# Patient Record
Sex: FEMALE | Race: BLACK OR AFRICAN AMERICAN | NOT HISPANIC OR LATINO | Employment: OTHER | URBAN - METROPOLITAN AREA
[De-identification: names, ages, dates, MRNs, and addresses within clinical notes are randomized per-mention and may not be internally consistent; named-entity substitution may affect disease eponyms.]

---

## 2017-10-30 ENCOUNTER — CONVERSION ENCOUNTER (OUTPATIENT)
Dept: OTHER | Facility: HOSPITAL | Age: 67
End: 2017-10-30

## 2018-04-11 ENCOUNTER — OFFICE VISIT CONVERTED (OUTPATIENT)
Dept: FAMILY MEDICINE CLINIC | Age: 68
End: 2018-04-11
Attending: FAMILY MEDICINE

## 2018-10-25 ENCOUNTER — OFFICE VISIT CONVERTED (OUTPATIENT)
Dept: FAMILY MEDICINE CLINIC | Age: 68
End: 2018-10-25
Attending: FAMILY MEDICINE

## 2018-11-08 ENCOUNTER — CONVERSION ENCOUNTER (OUTPATIENT)
Dept: OTHER | Facility: HOSPITAL | Age: 68
End: 2018-11-08

## 2018-11-21 ENCOUNTER — CONVERSION ENCOUNTER (OUTPATIENT)
Dept: OTHER | Facility: HOSPITAL | Age: 68
End: 2018-11-21

## 2019-04-17 ENCOUNTER — HOSPITAL ENCOUNTER (OUTPATIENT)
Dept: OTHER | Facility: HOSPITAL | Age: 69
Discharge: HOME | End: 2019-04-17
Attending: FAMILY MEDICINE

## 2019-04-17 ENCOUNTER — OFFICE VISIT CONVERTED (OUTPATIENT)
Dept: FAMILY MEDICINE CLINIC | Age: 69
End: 2019-04-17
Attending: FAMILY MEDICINE

## 2019-04-17 LAB
ALBUMIN SERPL-MCNC: 4.2 G/DL (ref 3.5–5)
ALBUMIN/GLOB SERPL: 1.3 {RATIO} (ref 1.4–2.6)
ALP SERPL-CCNC: 91 U/L (ref 43–160)
ALT SERPL-CCNC: 17 U/L (ref 10–40)
ANION GAP SERPL CALC-SCNC: 18 MMOL/L (ref 8–19)
AST SERPL-CCNC: 17 U/L (ref 15–50)
BILIRUB SERPL-MCNC: 1.02 MG/DL (ref 0.2–1.3)
BUN SERPL-MCNC: 19 MG/DL (ref 5–25)
BUN/CREAT SERPL: 27 {RATIO} (ref 6–20)
CALCIUM SERPL-MCNC: 9.6 MG/DL (ref 8.7–10.4)
CHLORIDE SERPL-SCNC: 101 MMOL/L (ref 99–111)
CHOLEST SERPL-MCNC: 161 MG/DL (ref 107–200)
CHOLEST/HDLC SERPL: 2.4 {RATIO} (ref 3–6)
CONV CO2: 26 MMOL/L (ref 22–32)
CONV TOTAL PROTEIN: 7.4 G/DL (ref 6.3–8.2)
CREAT UR-MCNC: 0.71 MG/DL (ref 0.5–0.9)
ERYTHROCYTE [DISTWIDTH] IN BLOOD BY AUTOMATED COUNT: 14.5 % (ref 11.5–14.5)
GFR SERPLBLD BASED ON 1.73 SQ M-ARVRAT: >60 ML/MIN/{1.73_M2}
GLOBULIN UR ELPH-MCNC: 3.2 G/DL (ref 2–3.5)
GLUCOSE SERPL-MCNC: 97 MG/DL (ref 65–99)
HBA1C MFR BLD: 12.5 G/DL (ref 12–16)
HCT VFR BLD AUTO: 38 % (ref 37–47)
HDLC SERPL-MCNC: 66 MG/DL (ref 40–60)
LDLC SERPL CALC-MCNC: 77 MG/DL (ref 70–100)
MCH RBC QN AUTO: 27.4 PG (ref 27–31)
MCHC RBC AUTO-ENTMCNC: 32.9 G/DL (ref 33–37)
MCV RBC AUTO: 83.2 FL (ref 81–99)
OSMOLALITY SERPL CALC.SUM OF ELEC: 294 MOSM/KG (ref 273–304)
PLATELET # BLD AUTO: 262 10*3/UL (ref 130–400)
PMV BLD AUTO: 9.8 FL (ref 7.4–10.4)
POTASSIUM SERPL-SCNC: 4.1 MMOL/L (ref 3.5–5.3)
RBC # BLD AUTO: 4.57 10*6/UL (ref 4.2–5.4)
SODIUM SERPL-SCNC: 141 MMOL/L (ref 135–147)
TRIGL SERPL-MCNC: 90 MG/DL (ref 40–150)
VLDLC SERPL-MCNC: 18 MG/DL (ref 5–37)
WBC # BLD AUTO: 4.87 10*3/UL (ref 4.8–10.8)

## 2019-10-17 ENCOUNTER — HOSPITAL ENCOUNTER (OUTPATIENT)
Dept: OTHER | Facility: HOSPITAL | Age: 69
Discharge: HOME | End: 2019-10-17
Attending: FAMILY MEDICINE

## 2019-10-17 ENCOUNTER — OFFICE VISIT CONVERTED (OUTPATIENT)
Dept: FAMILY MEDICINE CLINIC | Age: 69
End: 2019-10-17
Attending: FAMILY MEDICINE

## 2019-10-17 LAB
ALBUMIN SERPL-MCNC: 4.4 G/DL (ref 3.5–5)
ALBUMIN/GLOB SERPL: 1.4 {RATIO} (ref 1.4–2.6)
ALP SERPL-CCNC: 92 U/L (ref 43–160)
ALT SERPL-CCNC: 16 U/L (ref 10–40)
ANION GAP SERPL CALC-SCNC: 16 MMOL/L (ref 8–19)
AST SERPL-CCNC: 22 U/L (ref 15–50)
BILIRUB SERPL-MCNC: 0.89 MG/DL (ref 0.2–1.3)
BUN SERPL-MCNC: 9 MG/DL (ref 5–25)
BUN/CREAT SERPL: 12 {RATIO} (ref 6–20)
CALCIUM SERPL-MCNC: 9.7 MG/DL (ref 8.7–10.4)
CHLORIDE SERPL-SCNC: 98 MMOL/L (ref 99–111)
CHOLEST SERPL-MCNC: 162 MG/DL (ref 107–200)
CHOLEST/HDLC SERPL: 2.7 {RATIO} (ref 3–6)
CONV CO2: 27 MMOL/L (ref 22–32)
CONV TOTAL PROTEIN: 7.5 G/DL (ref 6.3–8.2)
CREAT UR-MCNC: 0.77 MG/DL (ref 0.5–0.9)
GFR SERPLBLD BASED ON 1.73 SQ M-ARVRAT: >60 ML/MIN/{1.73_M2}
GLOBULIN UR ELPH-MCNC: 3.1 G/DL (ref 2–3.5)
GLUCOSE SERPL-MCNC: 102 MG/DL (ref 65–99)
HDLC SERPL-MCNC: 60 MG/DL (ref 40–60)
LDLC SERPL CALC-MCNC: 83 MG/DL (ref 70–100)
OSMOLALITY SERPL CALC.SUM OF ELEC: 283 MOSM/KG (ref 273–304)
POTASSIUM SERPL-SCNC: 3.9 MMOL/L (ref 3.5–5.3)
SODIUM SERPL-SCNC: 137 MMOL/L (ref 135–147)
TRIGL SERPL-MCNC: 93 MG/DL (ref 40–150)
VLDLC SERPL-MCNC: 19 MG/DL (ref 5–37)

## 2019-11-11 ENCOUNTER — HOSPITAL ENCOUNTER (OUTPATIENT)
Dept: OTHER | Facility: HOSPITAL | Age: 69
Discharge: HOME | End: 2019-11-11
Attending: FAMILY MEDICINE

## 2020-04-20 ENCOUNTER — OFFICE VISIT CONVERTED (OUTPATIENT)
Dept: FAMILY MEDICINE CLINIC | Age: 70
End: 2020-04-20
Attending: FAMILY MEDICINE

## 2020-06-02 ENCOUNTER — HOSPITAL ENCOUNTER (OUTPATIENT)
Dept: OTHER | Facility: HOSPITAL | Age: 70
Discharge: HOME | End: 2020-06-02
Attending: FAMILY MEDICINE

## 2020-06-02 LAB
ALBUMIN SERPL-MCNC: 4 G/DL (ref 3.5–5)
ALBUMIN/GLOB SERPL: 1.4 {RATIO} (ref 1.4–2.6)
ALP SERPL-CCNC: 96 U/L (ref 43–160)
ALT SERPL-CCNC: 17 U/L (ref 10–40)
ANION GAP SERPL CALC-SCNC: 16 MMOL/L (ref 8–19)
AST SERPL-CCNC: 21 U/L (ref 15–50)
BILIRUB SERPL-MCNC: 0.71 MG/DL (ref 0.2–1.3)
BUN SERPL-MCNC: 15 MG/DL (ref 5–25)
BUN/CREAT SERPL: 20 {RATIO} (ref 6–20)
CALCIUM SERPL-MCNC: 9.2 MG/DL (ref 8.7–10.4)
CHLORIDE SERPL-SCNC: 104 MMOL/L (ref 99–111)
CHOLEST SERPL-MCNC: 154 MG/DL (ref 107–200)
CHOLEST/HDLC SERPL: 2.8 {RATIO} (ref 3–6)
CONV CO2: 25 MMOL/L (ref 22–32)
CONV TOTAL PROTEIN: 6.8 G/DL (ref 6.3–8.2)
CREAT UR-MCNC: 0.75 MG/DL (ref 0.5–0.9)
GFR SERPLBLD BASED ON 1.73 SQ M-ARVRAT: >60 ML/MIN/{1.73_M2}
GLOBULIN UR ELPH-MCNC: 2.8 G/DL (ref 2–3.5)
GLUCOSE SERPL-MCNC: 105 MG/DL (ref 65–99)
HDLC SERPL-MCNC: 56 MG/DL (ref 40–60)
LDLC SERPL CALC-MCNC: 86 MG/DL (ref 70–100)
OSMOLALITY SERPL CALC.SUM OF ELEC: 293 MOSM/KG (ref 273–304)
POTASSIUM SERPL-SCNC: 4.2 MMOL/L (ref 3.5–5.3)
SODIUM SERPL-SCNC: 141 MMOL/L (ref 135–147)
TRIGL SERPL-MCNC: 59 MG/DL (ref 40–150)
VLDLC SERPL-MCNC: 12 MG/DL (ref 5–37)

## 2020-06-15 ENCOUNTER — OFFICE VISIT CONVERTED (OUTPATIENT)
Dept: FAMILY MEDICINE CLINIC | Age: 70
End: 2020-06-15
Attending: FAMILY MEDICINE

## 2020-10-19 ENCOUNTER — OFFICE VISIT CONVERTED (OUTPATIENT)
Dept: FAMILY MEDICINE CLINIC | Age: 70
End: 2020-10-19
Attending: FAMILY MEDICINE

## 2020-12-14 ENCOUNTER — HOSPITAL ENCOUNTER (OUTPATIENT)
Dept: OTHER | Facility: HOSPITAL | Age: 70
Discharge: HOME | End: 2020-12-14
Attending: FAMILY MEDICINE

## 2020-12-14 LAB
ALBUMIN SERPL-MCNC: 4.1 G/DL (ref 3.5–5)
ALBUMIN/GLOB SERPL: 1.4 {RATIO} (ref 1.4–2.6)
ALP SERPL-CCNC: 114 U/L (ref 43–160)
ALT SERPL-CCNC: 20 U/L (ref 10–40)
ANION GAP SERPL CALC-SCNC: 15 MMOL/L (ref 8–19)
AST SERPL-CCNC: 21 U/L (ref 15–50)
BILIRUB SERPL-MCNC: 0.7 MG/DL (ref 0.2–1.3)
BUN SERPL-MCNC: 15 MG/DL (ref 5–25)
BUN/CREAT SERPL: 25 {RATIO} (ref 6–20)
CALCIUM SERPL-MCNC: 9.3 MG/DL (ref 8.7–10.4)
CHLORIDE SERPL-SCNC: 99 MMOL/L (ref 99–111)
CHOLEST SERPL-MCNC: 178 MG/DL (ref 107–200)
CHOLEST/HDLC SERPL: 2.7 {RATIO} (ref 3–6)
CONV CO2: 28 MMOL/L (ref 22–32)
CONV TOTAL PROTEIN: 7 G/DL (ref 6.3–8.2)
CREAT UR-MCNC: 0.61 MG/DL (ref 0.5–0.9)
GFR SERPLBLD BASED ON 1.73 SQ M-ARVRAT: >60 ML/MIN/{1.73_M2}
GLOBULIN UR ELPH-MCNC: 2.9 G/DL (ref 2–3.5)
GLUCOSE SERPL-MCNC: 92 MG/DL (ref 65–99)
HDLC SERPL-MCNC: 65 MG/DL (ref 40–60)
LDLC SERPL CALC-MCNC: 99 MG/DL (ref 70–100)
OSMOLALITY SERPL CALC.SUM OF ELEC: 286 MOSM/KG (ref 273–304)
POTASSIUM SERPL-SCNC: 3.9 MMOL/L (ref 3.5–5.3)
SODIUM SERPL-SCNC: 138 MMOL/L (ref 135–147)
TRIGL SERPL-MCNC: 68 MG/DL (ref 40–150)
VLDLC SERPL-MCNC: 14 MG/DL (ref 5–37)

## 2021-05-18 NOTE — PROGRESS NOTES
Simi Dangelo  1950     Office/Outpatient Visit    Visit Date: Mon, Rey 15, 2020 11:10 am    Provider: Shanika Crawford MD (Assistant: Spurling, Sarah C, MA)    Location: [facility]        Electronically signed by Shanika Crawford MD on  06/16/2020 05:05:46 PM                             Subjective:        CC: Ms. Dangelo is a 70 year old Black or  female.  medicare wellness.;         HPI:           Ms. Dangelo is here for a Medicare wellness visit.  The required HRA questions are integrated within this visit note. Family medical history and individual medical/surgical history were reviewed and updated.  A current height, weight, BMI, blood pressure, and pulse were recorded in the vitals section of the note and have been reviewed. Patient's medications, including supplements, were recorded in the chart and reviewed.  Current providers and suppliers were reviewed and updated.          Self-Assessment of Health: She rates her health as very good. She rates her confidence of being able to control/manage most of her health problems as very confident. Her physical/emotional health has limited her social activites not at all.  A review of cognitive impairment was performed, including ability to drive a car, manage finances, and any memory changes, and was found to be negative.  A review of functional ability, including bathing, dressing, walking, and urine/bowel continence as well as level of safety was performed and was found to be negative.  Falls Risk: Has not had any falls or only one fall without injury in the past year.  She denies having trouble hearing the TV/radio when others do not, having to strain to hear or understand conversations and wearing hearing aid(s).  Concerning home safety, she reports that at home she DOES have adequate lighting, a skid resistant shower/tub, functioning smoke alarms and absence of throw rugs, but not grab bars in the bath or handrails on stairs.   Physical Activity: She never excercises.; Type of diet patient normally eats is described as poor--needs improvement.  Tobacco: She has never smoked.   Preventative Health updated today       finger pain in both fingers B, stiffness, and R is worse than L, onset recently with her increased phone use, playing games.       she is ready to lose weight, she does not exercise    ROS:     CONSTITUTIONAL:  Negative for chills, fatigue, fever, and weight change.      E/N/T:  Negative for hearing problems, E/N/T pain, congestion, rhinorrhea, epistaxis, hoarseness, and dental problems.      CARDIOVASCULAR:  Negative for chest pain, palpitations, tachycardia, orthopnea, and edema.      RESPIRATORY:  Negative for cough, dyspnea, and hemoptysis.      GASTROINTESTINAL:  Negative for abdominal pain, heartburn, constipation, diarrhea, and stool changes.      MUSCULOSKELETAL:  Negative for arthralgias, back pain, and myalgias.      INTEGUMENTARY/BREAST:  Negative for atypical moles, dry skin, pruritis, rashes, breast masses, and nipple discharge.      NEUROLOGICAL:  Negative for dizziness and headaches.      PSYCHIATRIC:  Negative for anxiety, depression, and sleep disturbances.          Past Medical History / Family History / Social History:         Last Reviewed on 6/15/2020 11:56 AM by Shanika Crawford    Past Medical History: OA knees    HTN    hypercholesterolemia    hemorrhoids    TKR    varicose veins    fecal incontinence                 PAST MEDICAL HISTORY             GYNECOLOGICAL HISTORY:        Last mammogram was 11/3/17; No history of abnormal mammograms         CURRENT MEDICAL PROVIDERS:    Ophthalmologist: Hui    Orthopedist: [facility]    Dentist-Dr June         PREVENTIVE HEALTH MAINTENANCE             BONE DENSITY: was last done 19 with normal results     COLORECTAL CANCER SCREENING: Up to date (colonoscopy q10y; sigmoidoscopy q5y; Cologuard q3y) was last done  2/14/14, Results are in chart; colonoscopy with normal results     DENTAL CLEANING: was last done march 2020     EYE EXAM: was last done 8/2019 wears glasses     Hepatitis C Medicare Screening: was last done 1/2008     MAMMOGRAM: Done within last 2 years and results in are chart was last done 11/11/19 with normal results     PAP SMEAR: No longer indicated due to age and history         PAST MEDICAL HISTORY         Positive for    Dr Jay;         PAST MEDICAL HISTORY                 ADVANCED DIRECTIVES: None         Surgical History:         Appendectomy    Tonsillectomy/Adenoidectomy     Hysterectomy: With  L oophrectomy;     giant cell tumor removal 2007 and 2011, 2014 - L thumb;    BTL;     S/P REMOVAL GIANT CELL CYST L THUMB 8/2014 [facility] AND [facility]         Family History:         Positive for Pulmonary Embolism ( brother ).          Tobacco/Alcohol/Supplements:     Last Reviewed on 6/15/2020 11:11 AM by Spurling, Sarah C    Tobacco: She has never smoked.  Non-drinker         Substance Abuse History:     Last Reviewed on 11/17/2015 11:02 AM by Essence Ricks        Mental Health History:     Last Reviewed on 11/17/2015 11:02 AM by Essence Ricks        Communicable Diseases (eg STDs):     Last Reviewed on 11/17/2015 11:02 AM by Essence Ricks        Immunizations:     Td adult 2/2/2008    Td adult 9/15/2011    Hep A, unspecified formulation 6/14/2018    zzPrevnar-13 1/11/2016    Influenza A (H1N1), IM (3+ years) Monovalent 12/18/2009    Fluzone High-Dose pf (>=65 yr) 10/8/2016    Fluzone High-Dose pf (>=65 yr) 10/4/2017    Fluzone High-Dose pf (>=65 yr) 10/25/2018    Fluzone High-Dose pf (>=65 yr) 10/17/2019    Influenza Virus Vaccine, unspecified formulation 9/15/2011    PNEUMOVAX 23 (Pneumococcal PPV23) 12/6/2013    Zostavax (Zoster live) 6/11/2013        Allergies:     Last Reviewed on 4/20/2020 01:10 PM by Yamilka Galindo    No Known Allergies.        Current Medications:     Last Reviewed on 6/15/2020 11:14 AM by  Spurling, Sarah C    Lipitor 10 mg oral tablet [Take 1 tablet(s) by mouth daily]    lisinopriL-hydrochlorothiazide 10-12.5 mg oral tablet [one a day]    B Complex QOD     Vit D 3     Meloxicam 15 mg oral tablet [TAKES HALF OF A 15 DAILY]    Zaditor 0.025 % (0.035 %) ophthalmic (eye) Drops [ 1 gtts to both eyes QD PRN]    Allegra-D 24 Hour 180-240 mg oral Tablet, Extended Release 24 hr [1 tab daily]    fluticasone propionate 50 mcg/actuation Intranasal Spray, Suspension [inhale 1 spray (50 mcg) in each nostril by intranasal route 2 times per day]        Objective:        Vitals:         Current: 6/15/2020 11:22:06 AM    Ht:  5 ft, 3 in;  Wt: 196.8 lbs;  BMI: 34.9T: 97.8 F (oral);  BP: 139/71 mm Hg (left arm, sitting);  P: 69 bpm (left arm (BP Cuff), sitting);  sCr: 0.75 mg/dL;  GFR: 75.14VA: 20/40 OD, 20/30 OS (near, with correction)        Exams:     PHYSICAL EXAM:     GENERAL: vital signs recorded - well developed, well nourished;  no apparent distress;     EYES: PERRL, EOMI     E/N/T:  normal EACs, TMs, nasal/oral mucosa, teeth, gingiva, and oropharynx;     NECK: range of motion is normal; thyroid is non-palpable;     RESPIRATORY: normal respiratory rate and pattern with no distress; normal breath sounds with no rales, rhonchi, wheezes or rubs;     CARDIOVASCULAR: normal rate; rhythm is regular;  no systolic murmur; no edema;     GASTROINTESTINAL: nontender; normal bowel sounds; no organomegaly;     BREAST/INTEGUMENT: BREASTS: breast exam is normal without masses, skin changes, or nipple discharge;     MUSCULOSKELETAL:  Normal range of motion, strength and tone;     NEUROLOGICAL:  cranial nerves, motor and sensory function, reflexes, gait and coordination are all intact;     PSYCHIATRIC:  appropriate affect and demeanor; normal speech pattern; grossly normal memory;         Assessment:         Z00.00   Encounter for general adult medical examination without abnormal findings       M25.541   Pain in joints of right  hand       M25.542   Pain in joints of left hand       E66.01   Morbid (severe) obesity due to excess calories           ORDERS:         Procedures Ordered:         Annual wellness visit, includes a PPPS, subsequent visit  (In-House)                      Plan:         Encounter for general adult medical examination without abnormal findings  MEDICARE WELLNESS EXAM-SHE IS UTD ON COLONOSCOPY/MAMMOGRAM/DEXA/PELVIC, , UTD ON FLU/SHINGLES/PNEUMOVAX/PREVNAR/TDAP,RECOMMEND SHINGRIX AND YEARLY FLU VACCINATION,  NO FALL RISK, NO MEMORY ISSUES OR DEPRESSION, SHE LIVES ALONE,  SHE IS ABLE TO DRIVE AND PERFORM ADL'S/FINANCES, HEARING IS ADEQUATE, SHE HAS A HA ON  A LIVING WILL AND A SAFETY AND A PREV SERVICES HANDOUT WAS GIVEN TO HER. RECOMMEND YEARLY EYE EXAMS, MD LIST UPDATED           Orders:         Annual wellness visit, includes a PPPS, subsequent visit  (In-House)              Pain in joints of right handdue to OA-she defers topical NSAID at this time        Pain in joints of left handdue to OA-she defers topical NSAID at this time        Morbid (severe) obesity due to excess caloriesrecommend walking 1 mile a day and counseled on low carb diet.             Charge Capture:         Primary Diagnosis:     Z00.00  Encounter for general adult medical examination without abnormal findings           Orders:        Annual wellness visit, includes a PPPS, subsequent visit  (In-House)              M25.541  Pain in joints of right hand           Orders:      64648-14  Office/outpatient visit; established patient, level 3  (In-House)              M25.542  Pain in joints of left hand     E66.01  Morbid (severe) obesity due to excess calories

## 2021-05-18 NOTE — PROGRESS NOTES
Simi Dangelo  1950     Office/Outpatient Visit    Visit Date: Mon, Apr 20, 2020 01:08 pm    Provider: Shanika Crawford MD (Assistant: Yamilka Galindo MA)    Location: [facility]        Electronically signed by Shanika Crawford MD on  04/21/2020 05:01:14 PM                             Subjective:        CC: Ms. Dangelo is a 70 year old Black or  female.  Medication refill.;         HPI:           Patient presents with pure hypercholesterolemia, unspecified.  Current treatment includes Lipitor and a low cholesterol/low fat diet.  Compliance with treatment has been good; she maintains her low cholesterol diet.  She denies experiencing any hypercholesterolemia related symptoms.  Most recent lab tests include Total Cholesterol:  162 (mg/dL) (10/17/2019), HDL:  60 (mg/dL) (10/17/2019), Triglycerides:  93 (mg/dL) (10/17/2019), LDL:  83 (mg/dL) (10/17/2019), Glucose, Serum:  102 (mg/dL) (10/17/2019), Creatinine, Serum:  0.77 (mg/dl) (10/17/2019), Glom Filt Rate, Est:  >60 (ml/min/1.73m2) (10/17/2019), Alkaline Phosphatase, Serum:  92 (U/L) (10/17/2019), ALT (SGPT):  16 (U/L) (10/17/2019), AST (SGOT):  22 (U/L) (10/17/2019).            Essential (primary) hypertension details; this was first diagnosed more than 5 years ago.  She is not using any nonpharmacologic treatment modalities.  Her current cardiac medication regimen includes a diuretic and an ACE inhibitor.  Compliance with treatment has been good; she takes her medication as directed, maintains her diet and exercise regimen, and follows up as directed.  She is tolerating the medication well without side effects.  Ms. Dangelo does not check her blood pressure other than at her clinic appointments.      ROS:     CONSTITUTIONAL:  Negative for chills, fatigue, fever, and weight change.      E/N/T:  Negative for hearing problems, E/N/T pain, congestion, rhinorrhea, epistaxis, hoarseness, and dental problems.      CARDIOVASCULAR:   Negative for chest pain, palpitations, tachycardia, orthopnea, and edema.      RESPIRATORY:  Negative for cough, dyspnea, and hemoptysis.      GASTROINTESTINAL:  Negative for abdominal pain, heartburn, constipation, diarrhea, and stool changes.      MUSCULOSKELETAL:  Negative for arthralgias, back pain, and myalgias.      INTEGUMENTARY/BREAST:  Negative for atypical moles, dry skin, pruritis, rashes, breast masses, and nipple discharge.      NEUROLOGICAL:  Negative for dizziness and headaches.      PSYCHIATRIC:  Negative for anxiety, depression, and sleep disturbances.          Past Medical History / Family History / Social History:         Last Reviewed on 2020 01:23 PM by Shanika Crawford    Past Medical History: OA knees    HTN    hypercholesterolemia    hemorrhoids    TKR    varicose veins    fecal incontinence                 PAST MEDICAL HISTORY             GYNECOLOGICAL HISTORY:        Last mammogram was 11/3/17; No history of abnormal mammograms         CURRENT MEDICAL PROVIDERS:    Ophthalmologist: Hui    Orthopedist: [facility]    Dentist-Dr June         PREVENTIVE HEALTH MAINTENANCE             BONE DENSITY: was last done 19 with normal results     COLORECTAL CANCER SCREENING: Up to date (colonoscopy q10y; sigmoidoscopy q5y; Cologuard q3y) was last done 14, Results are in chart; colonoscopy with normal results     EYE EXAM: was last done 2018 wears glasses     Hepatitis C Medicare Screening: was last done 2008     MAMMOGRAM: Done within last 2 years and results in are chart was last done 19 with normal results     PAP SMEAR: No longer indicated due to age and history         PAST MEDICAL HISTORY         Positive for    Dr Jay;         PAST MEDICAL HISTORY                 ADVANCED DIRECTIVES: None         Surgical History:         Appendectomy    Tonsillectomy/Adenoidectomy     Hysterectomy: With  L oophrectomy;     giant cell tumor  removal 2007 and 2011, 2014 - L thumb;    BTL;     S/P REMOVAL GIANT CELL CYST L THUMB 8/2014 [facility]         Family History:         Positive for Pulmonary Embolism ( brother ).          Tobacco/Alcohol/Supplements:     Last Reviewed on 4/20/2020 01:10 PM by Yamilka Galindo    Tobacco: She has never smoked.  Non-drinker         Substance Abuse History:     Last Reviewed on 11/17/2015 11:02 AM by Essence Ricks        Mental Health History:     Last Reviewed on 11/17/2015 11:02 AM by Essence Ricks        Communicable Diseases (eg STDs):     Last Reviewed on 11/17/2015 11:02 AM by Essence Ricks        Allergies:     Last Reviewed on 10/17/2019 10:32 AM by Erma Sauceda    No Known Allergies.        Current Medications:     Last Reviewed on 10/17/2019 10:32 AM by Erma Sauceda    Lipitor 10mg Tablet [Take 1 tablet(s) by mouth daily]    Lisinopril/Hydrochlorothiazide 10mg/12.5mg Tablet [one a day]    B Complex QOD    Vit D 3    Meloxicam 15mg Tablet [TAKES HALF OF A 15 DAILY]    Zaditor 0.025% Ophthalmic Solution [ 1 gtts to both eyes QD PRN]    Allegra-D 24 Hour 180mg/240mg Tablets, Extended Release [1 tab daily]        Objective:        Vitals:         Current: 4/20/2020 1:30:50 PM    Ht:  5 ft, 3 in;  Wt: 194 lbs;  BMI: 34.4T: 98.3 F (oral);  R: 16 bpm;  sCr: 0.77 mg/dL;  GFR: 72.74        Exams:     PHYSICAL EXAM:     GENERAL: vital signs recorded - well developed, well nourished;  well groomed;  no apparent distress;     EYES: PERRL, EOMI     RESPIRATORY: normal respiratory rate and pattern with no distress;     NEUROLOGIC: mental status: oriented to person, place, and time;  GROSSLY INTACT     PSYCHIATRIC:  appropriate affect and demeanor; normal speech pattern; grossly normal memory;         Assessment:         E78.00   Pure hypercholesterolemia, unspecified       I10   Essential (primary) hypertension       M17.0   Bilateral primary osteoarthritis of knee       T78.40XD   Allergy, unspecified, subsequent encounter            ORDERS:         Meds Prescribed:       [Refilled] Lipitor 10 mg oral tablet [Take 1 tablet(s) by mouth daily], #90 (ninety) tablets, Refills: 1 (one)       [Refilled] lisinopriL-hydrochlorothiazide 10-12.5 mg oral tablet [one a day], #90 (ninety) tablets, Refills: 1 (one)       [Recorded] fluticasone propionate 50 mcg/actuation Intranasal Spray, Suspension [inhale 1 spray (50 mcg) in each nostril by intranasal route 2 times per day]       [Refilled] fluticasone propionate 50 mcg/actuation Intranasal Spray, Suspension [inhale 1 spray (50 mcg) in each nostril by intranasal route 2 times per day], #3 (three) each, Refills: 1 (one)         Lab Orders:       [id number]  HTN - [facility] CMP AND LIPID: [id number]  (Send-Out)                      Plan:         Pure hypercholesterolemia, unspecified    LABORATORY:  Labs ordered to be performed today include HTN/Lipid Panel: CMP, Lipid.  Telehealth: Verbal consent obtained for visit to occur via [employer] conferencing; Staff, other than provider, present during telephone visit include Zandra Pradhan           Orders:       [id number]  HTN - [facility] CMP AND LIPID: [id number], [id number]  (Send-Out)              Essential (primary) hypertensionwell controlled, she will check her BP at home for me with her daughter who is a nurse          Prescriptions:       [Refilled] Lipitor 10 mg oral tablet [Take 1 tablet(s) by mouth daily], #90 (ninety) tablets, Refills: 1 (one)       [Refilled] lisinopriL-hydrochlorothiazide 10-12.5 mg oral tablet [one a day], #90 (ninety) tablets, Refills: 1 (one)       [Recorded] fluticasone propionate 50 mcg/actuation Intranasal Spray, Suspension [inhale 1 spray (50 mcg) in each nostril by intranasal route 2 times per day]       [Refilled] fluticasone propionate 50 mcg/actuation Intranasal Spray, Suspension [inhale 1 spray (50 mcg) in each nostril by intranasal route 2 times per day], #3 (three) each, Refills: 1 (one)         Bilateral primary osteoarthritis of  kneestable on meloxicam-she got this from Dr carpio and she takes prn        RECOMMENDATIONS given include: pt is made aware of increased risk of taking NSAIDs including kidney failure, GI upset and GI bleeding.          Allergy, unspecified, subsequent encounterstable on allegra and flonase OTC            Charge Capture:         Primary Diagnosis:     E78.00  Pure hypercholesterolemia, unspecified           Orders:      [id number]  Office/outpatient visit; established patient, level 4  (In-House)              I10  Essential (primary) hypertension     M17.0  Bilateral primary osteoarthritis of knee     T78.40XD  Allergy, unspecified, subsequent encounter

## 2021-05-18 NOTE — PROGRESS NOTES
Simi Dangelo X. 1950     Office/Outpatient Visit    Visit Date: Wed, Apr 11, 2018 02:14 pm    Provider: Shanika Crawford MD (Assistant: Katherine Hobbs MA)    Location: [facility]        Electronically signed by Shanika Crawford MD on  04/11/2018 05:49:30 PM                             SUBJECTIVE:        CC: medicare wellness exam         HPI:         Ms. Dangelo is here for a Medicare wellness visit.  Individual and family medical history was reviewed and updated A list of current providers and suppliers reviewed and updated A current height, weight, BMI, blood pressure, and pulse were recorded in the vitals section of the note and have been reviewed A review of cognitive impairment was performed and was negative.  A review of functional ability and level of safety was performed and was negative She denies having trouble hearing the TV/radio when others do not, having to strain to hear or understand conversations and wearing hearing aid(s).  Concerning home safety, she reports that at home she DOES have handrails on stairs and functioning smoke alarms, but not throw rugs, poor lighting, a slippery bath or shower or grab bars in the bath.      Immunization Status: Up to date;     Physical Activity: ** Exercises infrequently; Has not had any falls or only one fall without injury in the past year.  Advance Care Directive updated today in [facility] Tobacco: She has never smoked.    Preventative Health updated today         PHQ-9 Depression Screening: Completed form scanned and in chart; Total Score 0/27         Additionally, she presents with history of hypercholesterolemia.  current treatment includes Lipitor and a low cholesterol/low fat diet.  Compliance with treatment has been good; she maintains her low cholesterol diet.  She denies experiencing any hypercholesterolemia related symptoms.  Most recent lab tests include Total Cholesterol:  157 (mg/dL) (09/08/2017), HDL:  64 (mg/dL) (09/08/2017), Triglycerides:  66  (mg/dL) (09/08/2017), LDL:  80 (mg/dL) (09/08/2017), Glucose, Serum:  97 (mg/dL) (09/08/2017), Creatinine, Serum:  0.70 (mg/dl) (09/08/2017), Glom Filt Rate, Est:  >60 (ml/min/1.73m2) (09/08/2017), Alkaline Phosphatase, Serum:  82 (U/L) (09/08/2017), ALT (SGPT):  16 (U/L) (09/08/2017), AST (SGOT):  20 (U/L) (09/08/2017).          Dx with essential hypertension; this was first diagnosed more than 5 years ago.  She is not using any nonpharmacologic treatment modalities.  Her current cardiac medication regimen includes a diuretic and an ACE inhibitor.  Ms. Dangelo does not check her blood pressure other than at her clinic appointments.  She is tolerating the medication well without side effects.  Compliance with treatment has been good; she takes her medication as directed, maintains her diet and exercise regimen, and follows up as directed.      ROS:     CONSTITUTIONAL:  Negative for chills, fatigue, fever, and weight change.      EYES:  Negative for blurred vision, eye pain, and photophobia.      E/N/T:  Negative for hearing problems, E/N/T pain, congestion, rhinorrhea, epistaxis, hoarseness, and dental problems.      CARDIOVASCULAR:  Negative for chest pain, palpitations, tachycardia, orthopnea, and edema.      RESPIRATORY:  Negative for cough, dyspnea, and hemoptysis.      GASTROINTESTINAL:  Negative for abdominal pain, heartburn, constipation, diarrhea, and stool changes.      GENITOURINARY:  Negative for genital lesions, hematuria, menstrual problems, polyuria, abnormal vaginal bleeding, and vaginal discharge.      MUSCULOSKELETAL:  Negative for arthralgias, back pain, and myalgias.      INTEGUMENTARY/BREAST:  Negative for atypical moles, dry skin, pruritis, rashes, breast masses, and nipple discharge.      NEUROLOGICAL:  Negative for dizziness and headaches.      PSYCHIATRIC:  Negative for anxiety, depression, and sleep disturbances.          PMH/FMH/SH:     Last Reviewed on 4/11/2018 03:09 PM by Shanika Crawford  Mitali    Past Medical History: OA knees    HTN    hypercholesterolemia    hemorrhoids    TKR    varicose veins    fecal incontinence                 PAST MEDICAL HISTORY             GYNECOLOGICAL HISTORY:        Last mammogram was 11/3/17; No history of abnormal mammograms         CURRENT MEDICAL PROVIDERS:    Ophthalmologist: Hui    Orthopedist: [facility]    Dentist-Dr June         PREVENTIVE HEALTH MAINTENANCE             BONE DENSITY: was last done 10/26/16 with normal results     COLORECTAL CANCER SCREENING: Up to date (colonoscopy q10y; sigmoidoscopy q5y; Cologuard q3y) was last done 14, Results are in chart; colonoscopy with normal results     EYE EXAM: was last done 2017 wears glasses     Hepatitis C Medicare Screening: was last done      MAMMOGRAM: Done within last 2 years and results in are chart was last done 11-3-17 with normal results         PAST MEDICAL HISTORY         Positive for    Dr Jay;         PAST MEDICAL HISTORY                 ADVANCED DIRECTIVES: None         Surgical History:         Appendectomy    Tonsillectomy/Adenoidectomy      Hysterectomy: With  L oophrectomy;     giant cell tumor removal  and ,  - L thumb;    BTL;     S/P REMOVAL GIANT CELL CYST L THUMB 2014 [facility]         Tobacco/Alcohol/Supplements:     Last Reviewed on 2018 03:09 PM by Shanika Crawford    Tobacco: She has never smoked.  Non-drinker         Substance Abuse History:     Last Reviewed on 2015 11:02 AM by Essence Ricks        Mental Health History:     Last Reviewed on 2015 11:02 AM by Essence Ricks        Communicable Diseases (eg STDs):     Last Reviewed on 2015 11:02 AM by Essence Ricks            Immunizations:     Td adult 2008     Td adult 9/15/2011     zzPrevnar-13 2016     Influenza A (H1N1), IM (3+ years) Monovalent 2009     Fluzone High-Dose pf (>=65 yr) 10/8/2016     Fluzone High-Dose pf (>=65 yr)  10/4/2017     Influenza Virus Vaccine, unspecified formulation 9/15/2011     PNEUMOVAX 23 (Pneumococcal PPV23) 12/6/2013     Zostavax (Zoster) 6/11/2013         Allergies:     Last Reviewed on 4/11/2018 02:18 PM by Katherine Hobbs      No Known Drug Allergies.         Current Medications:     Last Reviewed on 4/11/2018 02:18 PM by Katherine Hobbs    Lisinopril/Hydrochlorothiazide 10mg/12.5mg Tablet one a day     Lipitor 10mg Tablet Take 1 tablet(s) by mouth daily     Zaditor 0.025% Ophthalmic Solution 1 gtts to both eyes QD PRN     B Complex QOD     Vit D 3     Allegra Allergy 12 Hour 60mg Tablet 1 tab po daily prn     Fluticasone Propionate 100mcg/1actuation Inhalation Powder Take 1 inhalation(s) by mouth bid         OBJECTIVE:        Vitals:         Current: 4/11/2018 2:17:19 PM    Ht:  5 ft, 3 in;  Wt: 190.6 lbs;  BMI: 33.8    T: 97.6 F (oral);  BP: 144/87 mm Hg (left arm, sitting);  P: 77 bpm (left arm (BP Cuff), sitting);  sCr: 0.7 mg/dL;  GFR: 82.80    VA: 20/40 OD, 20/40 OS (near, with correction)        Repeat:     2:27:17 PM     VA:    (20/40 OD,  (near, with correction, , 20/40 OS, , Bilateral 20/25))         Exams:     PHYSICAL EXAM:     GENERAL: vital signs recorded - well developed, well nourished;  no apparent distress;     EYES: extraocular movements intact; conjunctiva and cornea are normal; PERRLA;     E/N/T: EARS: external auditory canal occluded by cerumen bilaterally;  OROPHARYNX:  normal mucosa, dentition, gingiva, and posterior pharynx;     NECK: range of motion is normal; thyroid is non-palpable;     RESPIRATORY: normal respiratory rate and pattern with no distress; normal breath sounds with no rales, rhonchi, wheezes or rubs;     CARDIOVASCULAR: normal rate; rhythm is regular;  no systolic murmur; no edema;     GASTROINTESTINAL: nontender; normal bowel sounds; no organomegaly;     MUSCULOSKELETAL:  Normal range of motion, strength and tone;     skin-dermatofibroma on L shin-stable in size 1  cm, spider veins Lateral L knee     NEUROLOGICAL:  cranial nerves, motor and sensory function, reflexes, gait and coordination are all intact;     PSYCHIATRIC:  appropriate affect and demeanor; normal speech pattern; grossly normal memory;         ASSESSMENT           V70.0   Z00.00  Health checkup              DDx:     V79.0   Z13.89  Screening for depression              DDx:     288.50   D72.819  Leukopenia NOS              DDx:     455.3   K64.4  Hemorrhoids, external              DDx:     272.0   E78.00  Hypercholesterolemia              DDx:     715.16   M17.0  Osteoarthritis of knee              DDx:     401.1   I10  Essential hypertension              DDx:     380.4   H61.23  External cerumen impaction              DDx:     448.9   I78.9  Spider veins of legs              DDx:         ORDERS:         Meds Prescribed:       Refill of: Lisinopril/Hydrochlorothiazide 10mg/12.5mg Tablet one a day  #90 (Ninety) tablet(s) Refills: 1       Refill of: Lipitor (Atorvastatin Calcium) 10mg Tablet Take 1 tablet(s) by mouth daily  #90 (Ninety) tablet(s) Refills: 1       Refill of: Fluticasone Propionate 50mcg/1actuation Nasal Spray 2 spray(s) in each nostril daily  #16 (Sixteen) gm Refills: 5       Fexofenadine HCl 180mg Tablet 1 tab daily  #90 (Ninety) tablet(s) Refills: 1         Radiology/Test Orders:       3014F  Screening mammography results documented and reviewed (PV)1  (In-House)         3017F  Colorectal CA screen results documented and reviewed (PV)  (In-House)           Lab Orders:       [id number]  [facility] PHYSICAL: CMP, CBC, TSH, LIPID: [id number], [id number], [id number]  (Send-Out)           Procedures Ordered:       [id number]  Removal of impacted cerumen right ear (NURSE)  (In-House)           Other Orders:         Depression screen negative  (In-House)         1101F  Pt screen for fall risk; document no falls in past year or only 1 fall w/o injury in past year (NIKKI)  (In-House)           Negative EtOH  screen  (In-House)           Calculated BMI above the upper parameter and a follow-up plan was documented in the medical record  (In-House)         [id number]  Removal of impacted cerumen left ear (NURSE)  (In-House)                   PLAN:          Health checkup  MEDICARE WELLNESS EXAM-SHE IS UTD ON COLONOSCOPY/MAMMOGRAM/DEXA/PELVIC, UTD ON FLU/SHINGLES/PNEUMOVAX/PREVNAR/TDAP, NO FALL RISK, NO MEMORY ISSUES OR DEPRESSION, SHE LIVES WITH HER DAUGHTER AND FAMILY, SHE IS ABLE TO DRIVE AND PERFORM ADL'S/FINANCES, HEARING IS ADEQUATE, SHE HAS A HA ON  A LIVING WILL AND A SAFETY AND A [employer] HANDOUT WAS GIVEN TO HER.                 Screening for depression     MIPS Negative Depression Screen Negative alcohol screen     MAMMOGRAM: Done within last 2 years and results in are chart     COLORECTAL CANCER SCREENING: Results are in chart     BMI Elevated - Follow-Up Plan: She was provided education on weight loss strategies           Orders:         Depression screen negative  (In-House)         1101F  Pt screen for fall risk; document no falls in past year or only 1 fall w/o injury in past year (NIKKI)  (In-House)           Negative EtOH screen  (In-House)         3014F  Screening mammography results documented and reviewed (PV)1  (In-House)         3017F  Colorectal CA screen results documented and reviewed (PV)  (In-House)           Calculated BMI above the upper parameter and a follow-up plan was documented in the medical record  (In-House)            Leukopenia NOS     LABORATORY:  Labs ordered to be performed today include PHYSICAL PANEL; CMP, CBC, TSH, LIPID.            Orders:       [id number]  [facility] PHYSICAL: CMP, CBC, TSH, LIPID: [id number], [id number], [id number]  (Send-Out)            Hemorrhoids, external stable, uses preparation H prn          Hypercholesterolemia due for labs, stable overall           Prescriptions:       Refill of: Lisinopril/Hydrochlorothiazide 10mg/12.5mg Tablet one a day   #90 (Ninety) tablet(s) Refills: 1       Refill of: Lipitor (Atorvastatin Calcium) 10mg Tablet Take 1 tablet(s) by mouth daily  #90 (Ninety) tablet(s) Refills: 1       Refill of: Fluticasone Propionate 50mcg/1actuation Nasal Spray 2 spray(s) in each nostril daily  #16 (Sixteen) gm Refills: 5       Fexofenadine HCl 180mg Tablet 1 tab daily  #90 (Ninety) tablet(s) Refills: 1          Osteoarthritis of knee stable          Essential hypertension stable          External cerumen impaction           Orders:       [id number]  Removal of impacted cerumen left ear (NURSE)  (In-House)         [id number]  Removal of impacted cerumen right ear (NURSE)  (In-House)            Spider veins of legs recommend compression stocking or panty hose             CHARGE CAPTURE           **Please note: ICD descriptions below are intended for billing purposes only and may not represent clinical diagnoses**        Primary Diagnosis:         V70.0 Health checkup            Z00.00    Encounter for general adult medical examination without abnormal findings              Orders:          [id number]   Preventive medicine, established patient, age 65+ years  (In-House)           V79.0 Screening for depression            Z13.89    Encounter for screening for other disorder              Orders:          [id number] -25  Office/outpatient visit; established patient, level 4  (In-House)                Depression screen negative  (In-House)             1101F   Pt screen for fall risk; document no falls in past year or only 1 fall w/o injury in past year (NIKKI)  (In-House)                Negative EtOH screen  (In-House)             3014F   Screening mammography results documented and reviewed (PV)1  (In-House)             3017F   Colorectal CA screen results documented and reviewed (PV)  (In-House)                Calculated BMI above the upper parameter and a follow-up plan was documented in the medical record  (In-House)           288.50 Leukopenia NOS             D72.819    Decreased white blood cell count, unspecified    455.3 Hemorrhoids, external            K64.4    Residual hemorrhoidal skin tags    272.0 Hypercholesterolemia            E78.00    Pure hypercholesterolemia, unspecified    715.16 Osteoarthritis of knee            M17.0    Bilateral primary osteoarthritis of knee    401.1 Essential hypertension            I10    Essential (primary) hypertension    380.4 External cerumen impaction            H61.23    Impacted cerumen, bilateral              Orders:          [id number]   Removal of impacted cerumen left ear (NURSE)  (In-House)             [id number]   Removal of impacted cerumen right ear (NURSE)  (In-House)           448.9 Spider veins of legs            I78.9    Disease of capillaries, unspecified

## 2021-05-18 NOTE — PROGRESS NOTES
Simi Dangelo. 1950     Office/Outpatient Visit    Visit Date: Wed, Apr 17, 2019 10:00 am    Provider: Shanika Crawford MD (Assistant: Nury Goldstein MA)    Location: [facility]        Electronically signed by Shanika Crawford MD on  04/18/2019 04:17:29 PM                             SUBJECTIVE:        CC: MEDICARE WELLNESS EXAM         HPI:         Ms. Dangelo is here for a Medicare wellness visit.  ADVANCED DIRECTIVES: None     Returning to health checkup, the required HRA questions are integrated within this visit note. Family medical history and individual medical/surgical history were reviewed and updated.  A current height, weight, BMI, blood pressure, and pulse were recorded in the vitals section of the note and have been reviewed. Patient's medications, including supplements, were recorded in the chart and reviewed.  Current providers and suppliers were reviewed and updated.          Self-Assessment of Health: She rates her health as excellent. She rates her confidence of being able to control/manage most of her health problems as very confident. Her physical/emotional health has limited her social activites not at all.  A review of cognitive impairment was performed, including ability to drive a car, manage finances, and any memory changes, and was found to be negative.  A review of functional ability, including bathing, dressing, walking, and urine/bowel continence as well as level of safety was performed and was found to be negative.  Falls Risk: Has not had any falls or only one fall without injury in the past year.  She denies having trouble hearing the TV/radio when others do not, having to strain to hear or understand conversations and wearing hearing aid(s).  Concerning home safety, she reports that at home she DOES have adequate lighting, a skid resistant shower/tub, handrails on stairs, functioning smoke alarms and absence of throw rugs, but not grab bars in the bath.          Immunization  Status: Up to date; Physical Activity: She exercises but less than 20 minutes 3 days per week; Type of diet patient normally eats is described as well-balanced with fruits and vegetables Tobacco: She has never smoked.  Preventative Health updated today         PHQ-9 Depression Screening: Completed form scanned and in chart; Total Score 0 Alcohol Consumption Screening: Completed form scanned and in chart; Total Score 1     chronic L knee pain due to OA, she recently saw Dr Carmichael April 1st who gave her another steroid injection         Dx with hypercholesterolemia; current treatment includes Lipitor and a low cholesterol/low fat diet.  Compliance with treatment has been good; she maintains her low cholesterol diet.  She denies experiencing any hypercholesterolemia related symptoms.  Most recent lab tests include Alkaline Phosphatase:  93 (U/L) (10/16/2018), ALT (SGPT):  16 (U/L) (10/16/2018), AST (SGOT):  20 (U/L) (10/16/2018), Creatinine, Serum:  0.73 (mg/dl) (10/16/2018), Glom Filt Rate, Est:  >60 (ml/min/1.73m2) (10/16/2018), HDL:  58 (mg/dL) (10/16/2018), LDL:  84 (mg/dL) (10/16/2018), Total Cholesterol:  161 (mg/dL) (10/16/2018), Triglycerides:  94 (mg/dL) (10/16/2018), Alkaline Phosphatase, Serum:  93 (U/L) (10/16/2018).          Additionally, she presents with history of essential hypertension.  this was first diagnosed more than 5 years ago.  Her current cardiac medication regimen includes a diuretic and an ACE inhibitor.  Ms. Dangelo does not check her blood pressure other than at her clinic appointments.  She is tolerating the medication well without side effects.  Compliance with treatment has been good; she takes her medication as directed, maintains her diet and exercise regimen, and follows up as directed.      ROS:     CONSTITUTIONAL:  Negative for chills, fatigue, fever, and weight change.      EYES:  Negative for blurred vision, eye pain, and photophobia.      E/N/T:  Negative for hearing problems,  E/N/T pain, congestion, rhinorrhea, epistaxis, hoarseness, and dental problems.      CARDIOVASCULAR:  Negative for chest pain, palpitations, tachycardia, orthopnea, and edema.      RESPIRATORY:  Negative for cough, dyspnea, and hemoptysis.      GASTROINTESTINAL:  Negative for abdominal pain, heartburn, constipation, diarrhea, and stool changes.      GENITOURINARY:  Negative for genital lesions, hematuria, menstrual problems, polyuria, abnormal vaginal bleeding, and vaginal discharge.      MUSCULOSKELETAL:  Negative for arthralgias, back pain, and myalgias.      INTEGUMENTARY/BREAST:  Negative for atypical moles, dry skin, pruritis, rashes, breast masses, and nipple discharge.      NEUROLOGICAL:  Negative for dizziness and headaches.      PSYCHIATRIC:  Negative for anxiety, depression, and sleep disturbances.          PMH/FMH/SH:     Last Reviewed on 2019 10:36 AM by Shanika Crawford    Past Medical History: OA knees    HTN    hypercholesterolemia    hemorrhoids    TKR    varicose veins    fecal incontinence                 PAST MEDICAL HISTORY             GYNECOLOGICAL HISTORY:        Last mammogram was 11/3/17; No history of abnormal mammograms         CURRENT MEDICAL PROVIDERS:    Ophthalmologist: Hui    Orthopedist: [facility]    Dentist-Dr June         PREVENTIVE HEALTH MAINTENANCE             BONE DENSITY: was last done 10/26/16 with normal results     COLORECTAL CANCER SCREENING: Up to date (colonoscopy q10y; sigmoidoscopy q5y; Cologuard q3y) was last done 14, Results are in chart; colonoscopy with normal results     EYE EXAM: was last done 2018 wears glasses     Hepatitis C Medicare Screening: was last done      MAMMOGRAM: Done within last 2 years and results in are chart was last done 11-3-17 with normal results     PAP SMEAR: No longer indicated due to age and history         PAST MEDICAL HISTORY         Positive for    Dr Jay;         PAST  MEDICAL HISTORY                 ADVANCED DIRECTIVES: None         Surgical History:         Appendectomy    Tonsillectomy/Adenoidectomy      Hysterectomy: With  L oophrectomy;     giant cell tumor removal 2007 and 2011, 2014 - L thumb;    BTL;     S/P REMOVAL GIANT CELL CYST L THUMB 8/2014 [facility]         Tobacco/Alcohol/Supplements:     Last Reviewed on 4/17/2019 10:36 AM by Shanika Crawford    Tobacco: She has never smoked.  Non-drinker         Substance Abuse History:     Last Reviewed on 11/17/2015 11:02 AM by Essence Ricks        Mental Health History:     Last Reviewed on 11/17/2015 11:02 AM by Essence Ricks        Communicable Diseases (eg STDs):     Last Reviewed on 11/17/2015 11:02 AM by Essence Ricks            Immunizations:     Td adult 2/2/2008     Td adult 9/15/2011     Hep A, unspecified formulation 6/14/2018     zzPrevnar-13 1/11/2016     Influenza A (H1N1), IM (3+ years) Monovalent 12/18/2009     Fluzone High-Dose pf (>=65 yr) 10/8/2016     Fluzone High-Dose pf (>=65 yr) 10/4/2017     Fluzone High-Dose pf (>=65 yr) 10/25/2018     Influenza Virus Vaccine, unspecified formulation 9/15/2011     PNEUMOVAX 23 (Pneumococcal PPV23) 12/6/2013     Zostavax (Zoster live) 6/11/2013         Allergies:     Last Reviewed on 4/17/2019 10:03 AM by Nury Goldstein      No Known Drug Allergies.         Current Medications:     Last Reviewed on 4/17/2019 10:05 AM by Nury Goldstein    Lipitor 10mg Tablet Take 1 tablet(s) by mouth daily     Lisinopril/Hydrochlorothiazide 10mg/12.5mg Tablet one a day     Fluticasone Propionate 50mcg/1actuation Nasal Spray 2 spray(s) in each nostril daily     Meloxicam 15mg Tablet TAKES HALF OF A 15 DAILY     Zaditor 0.025% Ophthalmic Solution 1 gtts to both eyes QD PRN     B Complex QOD     Vit D 3     Allegra-D 24 Hour Tablets, Extended Release 1 tab daily         OBJECTIVE:        Vitals:         Current: 4/17/2019 10:06:55 AM    Ht:  5 ft, 3 in;  Wt: 192.8 lbs;  BMI: 34.2    T: 98.2  F (oral);  BP: 120/66 mm Hg (right arm, sitting);  P: 69 bpm (right arm (BP Cuff), sitting);  sCr: 0.73 mg/dL;  GFR: 77.61    VA: 20/25 OD, 20/25 OS (near, with correction)        ASSESSMENT           V70.0   Z00.00  Health checkup              DDx:     V79.0   Z13.89  Screening for depression              DDx:     V76.12   Z12.31  Screening mammogram - other              DDx:     278.02   E66.3  Overweight              DDx:     454.8   I83.813  Varicose veins of lower extremities, with Pain              DDx:     288.50   D72.819  Leukopenia NOS              DDx:     455.3   K64.4  Hemorrhoids, external              DDx:     715.16   M17.0  Osteoarthritis of knee              DDx:     272.0   E78.00  Hypercholesterolemia              DDx:     401.1   I10  Essential hypertension              DDx:     238.2   D48.5  Atypical skin lesion              DDx:     380.4   H61.23  External cerumen impaction              DDx:         ORDERS:         Meds Prescribed:       Refill of: Lipitor (Atorvastatin Calcium)  10mg Tablet Take 1 tablet(s) by mouth daily  #90 (Ninety) tablet(s) Refills: 1       Refill of: Lisinopril/Hydrochlorothiazide (Lisinopril/Hydrochlorothiazide)  10mg/12.5mg Tablet one a day  #90 (Ninety) tablet(s) Refills: 1         Radiology/Test Orders:       3014F  Screening mammography results documented and reviewed (PV)1  (In-House)         3017F  Colorectal CA screen results documented and reviewed (PV)  (In-House)         44409  Screening mammography bi 2-view inc CAD  (Send-Out)           Lab Orders:       [id number]  BDCB2 - H CBC w/o diff  (Send-Out)         62195  HTNLP - [facility] CMP AND LIPID: [id number]  (Send-Out)         APPTO  Appointment need  (In-House)           Procedures Ordered:         Annual wellness visit, includes a PPPS, subsequent visit  (In-House)         REFER  Referral to Specialist or Other Facility  (Send-Out)         [id number]  Removal of impacted cerumen right ear (NURSE)   (In-House)           Other Orders:         Depression screen negative  (In-House)         1101F  Pt screen for fall risk; document no falls in past year or only 1 fall w/o injury in past year (NIKKI)  (In-House)           Negative EtOH screen  (In-House)           Calculated BMI above the upper parameter and a follow-up plan was documented in the medical record  (In-House)         [id number]  Removal of impacted cerumen left ear (NURSE)  (In-House)                   PLAN:          Health checkup   MEDICARE WELLNESS EXAM-SHE IS UTD ON COLONOSCOPY/MAMMOGRAM/PELVIC, DUE FOR DEXA IN 10/2019, UTD ON FLU/SHINGLES/PNEUMOVAX/PREVNAR/TDAP, NO FALL RISK, NO MEMORY ISSUES OR DEPRESSION, SHE LIVES WITH HER DAUGHTER AND FAMILY, SHE IS ABLE TO DRIVE AND PERFORM ADL'S/FINANCES, HEARING IS ADEQUATE, SHE HAS A HA ON  A LIVING WILL AND A SAFETY AND A [employer] HANDOUT WAS GIVEN TO HER. RECOMMEND YEARLY EYE EXAMS, MD LIST UPDATED           Orders:         Annual wellness visit, includes a PPPS, subsequent visit  (In-House)            Screening for depression     MIPS Negative Depression Screen Negative alcohol screen     MAMMOGRAM: Done within last 2 years and results in are chart     COLORECTAL CANCER SCREENING: Results are in chart     BMI Elevated - Follow-Up Plan: She was provided education on weight loss strategies           Orders:         Depression screen negative  (In-House)         1101F  Pt screen for fall risk; document no falls in past year or only 1 fall w/o injury in past year (NIKKI)  (In-House)           Negative EtOH screen  (In-House)         3014F  Screening mammography results documented and reviewed (PV)1  (In-House)         3017F  Colorectal CA screen results documented and reviewed (PV)  (In-House)           Calculated BMI above the upper parameter and a follow-up plan was documented in the medical record  (In-House)            Screening mammogram - other           Orders:        [id number]  Screening mammography bi 2-view inc CAD  (Send-Out)            Overweight SHE DEFERS REFERAL TO DIETICIAN          Varicose veins of lower extremities, with Pain STABLE,recommend she wear stockings          Leukopenia NOS due for repeat labs, no chronic infections     LABORATORY:  Labs ordered to be performed today include CBC W/O DIFF.            Orders:       [id number]  BDCB2 - [facility] CBC w/o diff  (Send-Out)            Hemorrhoids, external stable          Osteoarthritis of knee sees Dr Carmichael, s/p steroid joint injection          Hypercholesterolemia stable on lipitor     LABORATORY:  Labs ordered to be performed today include HTN/Lipid Panel: CMP, Lipid.            Prescriptions:       Refill of: Lipitor (Atorvastatin Calcium)  10mg Tablet Take 1 tablet(s) by mouth daily  #90 (Ninety) tablet(s) Refills: 1       Refill of: Lisinopril/Hydrochlorothiazide (Lisinopril/Hydrochlorothiazide)  10mg/12.5mg Tablet one a day  #90 (Ninety) tablet(s) Refills: 1           [facility]:       [id number] [id number][facility] [id number]  (Send-Out)            Essential hypertension very well controlled         FOLLOW-UP: Schedule a follow-up visit in 6 months..   Chronic visit follow up           Orders:       APPTO  Appointment need  (In-House)            Atypical skin lesion 2 mm and 1 cm dark atypical skin lesions on L shin and new atypical 4 mm skin lesion behind R ear and L neck cyst 6 mm         REFERRALS:  Referral initiated to a dermatologist ( Dr. Nai Thomas ).            Orders:       REFER  Referral to Specialist or Other Facility  (Send-Out)            External cerumen impaction           Orders:       [id number]  Removal of impacted cerumen left ear (NURSE)  (In-House)         [id number]  Removal of impacted cerumen right ear (NURSE)  (In-House)               Patient Recommendations:        For  Essential hypertension:     Schedule a follow-up visit in 6 months.                APPOINTMENT INFORMATION:        Monday   Tuesday Wednesday Thursday Friday Saturday Sunday            Time:___________________AM  PM   Date:_____________________             CHARGE CAPTURE           **Please note: ICD descriptions below are intended for billing purposes only and may not represent clinical diagnoses**        Primary Diagnosis:         V70.0 Health checkup            Z00.00    Encounter for general adult medical examination without abnormal findings              Orders:          [id number]   Preventive medicine, established patient, age 65+ years  (In-House)                Annual wellness visit, includes a PPPS, subsequent visit  (In-House)           V79.0 Screening for depression            Z13.89    Encounter for screening for other disorder              Orders:          99325 -25  Office/outpatient visit; established patient, level 4  (In-House)                Depression screen negative  (In-House)             1101F   Pt screen for fall risk; document no falls in past year or only 1 fall w/o injury in past year (NIKKI)  (In-House)                Negative EtOH screen  (In-House)             3014F   Screening mammography results documented and reviewed (PV)1  (In-House)             3017F   Colorectal CA screen results documented and reviewed (PV)  (In-House)                Calculated BMI above the upper parameter and a follow-up plan was documented in the medical record  (In-House)           V76.12 Screening mammogram - other            Z12.31    Encounter for screening mammogram for malignant neoplasm of breast    278.02 Overweight            E66.3    Overweight    454.8 Varicose veins of lower extremities, with Pain            I83.813    Varicose veins of bilateral lower extremities with pain    288.50 Leukopenia NOS            D72.819    Decreased white blood cell count, unspecified    455.3 Hemorrhoids, external            K64.4    Residual hemorrhoidal skin tags    715.16 Osteoarthritis of knee            M17.0     Bilateral primary osteoarthritis of knee    272.0 Hypercholesterolemia            E78.00    Pure hypercholesterolemia, unspecified    401.1 Essential hypertension            I10    Essential (primary) hypertension              Orders:          APPTO   Appointment need  (In-House)           238.2 Atypical skin lesion            D48.5    Neoplasm of uncertain behavior of skin    380.4 External cerumen impaction            H61.23    Impacted cerumen, bilateral              Orders:          [id number]   Removal of impacted cerumen left ear (NURSE)  (In-House)             [id number]   Removal of impacted cerumen right ear (NURSE)  (In-House)               ADDENDUMS:      ____________________________________    Addendum: 04/24/2019 05:16 PM - Shanika Crawford        Exams:     PHYSICAL EXAM:     GENERAL: vital signs recorded - well developed, well nourished;  no apparent distress;     EYES: PERRL, EOMI     E/N/T:  OROPHARYNX:  normal mucosa, dentition, gingiva, and posterior pharynx;     NECK: range of motion is normal; thyroid is non-palpable;     RESPIRATORY: normal respiratory rate and pattern with no distress; normal breath sounds with no rales, rhonchi, wheezes or rubs;     CARDIOVASCULAR: normal rate; rhythm is regular;  no systolic murmur; no edema;     GASTROINTESTINAL: nontender; normal bowel sounds; no organomegaly;     BREAST/INTEGUMENT: BREASTS: breast exam is normal without masses, skin changes, or nipple discharge;     MUSCULOSKELETAL:  Normal range of motion, strength and tone;     NEUROLOGICAL:  cranial nerves, motor and sensory function, reflexes, gait and coordination are all intact;     PSYCHIATRIC:  appropriate affect and demeanor; normal speech pattern; grossly normal memory;

## 2021-05-18 NOTE — PROGRESS NOTES
Antolin, Ada X. 1950     Office/Outpatient Visit    Visit Date: Thu, Oct 25, 2018 01:25 pm    Provider: Shanika Crawford MD (Assistant: Sarah Spurling, MA)    Location: [facility]        Electronically signed by Shanika Crawford MD on  10/25/2018 05:12:29 PM                             SUBJECTIVE:        CC: routine med refill and breast exam         HPI:     Her last gynecological exam was one year ago.  Her last Pap smear was in HYSTERECTOMY IN 1985.  Her last mammogram was 1 year ago and was normal.  DEXA scan on two years ago.  She is not currently using any form of contraception.  She performs breast self-exams rarely.  She is not current with her influenza immunization.          PHQ-9 Depression Screening: Completed form scanned and in chart; Total Score 0 Alcohol Consumption Screening: Completed form scanned and in chart; Total Score 1         Dx with hypercholesterolemia; current treatment includes Lipitor and a low cholesterol/low fat diet.  Compliance with treatment has been good; she maintains her low cholesterol diet.  She denies experiencing any hypercholesterolemia related symptoms.  Most recent lab tests include Total Cholesterol:  161 (mg/dL) (10/16/2018), HDL:  58 (mg/dL) (10/16/2018), Triglycerides:  94 (mg/dL) (10/16/2018), LDL:  84 (mg/dL) (10/16/2018), Creatinine, Serum:  0.73 (mg/dl) (10/16/2018), Glom Filt Rate, Est:  >60 (ml/min/1.73m2) (10/16/2018), Alkaline Phosphatase, Serum:  93 (U/L) (10/16/2018), ALT (SGPT):  16 (U/L) (10/16/2018), AST (SGOT):  20 (U/L) (10/16/2018).          Concerning essential hypertension, this was first diagnosed more than 5 years ago.  She is not using any nonpharmacologic treatment modalities.  Her current cardiac medication regimen includes a diuretic and an ACE inhibitor.  Ms. Dangelo does not check her blood pressure other than at her clinic appointments.  She is tolerating the medication well without side effects.  Compliance with treatment has been  good; she takes her medication as directed, maintains her diet and exercise regimen, and follows up as directed.      she was anemic post op in 2017-she wants her cbc rechecked today     ROS:     CONSTITUTIONAL:  Negative for chills, fatigue, fever, and weight change.      E/N/T:  Negative for hearing problems, E/N/T pain, congestion, rhinorrhea, epistaxis, hoarseness, and dental problems.      CARDIOVASCULAR:  Negative for chest pain, palpitations, tachycardia, orthopnea, and edema.      RESPIRATORY:  Negative for cough, dyspnea, and hemoptysis.      GASTROINTESTINAL:  Negative for abdominal pain, heartburn, constipation, diarrhea, and stool changes.      MUSCULOSKELETAL:  Negative for arthralgias, back pain, and myalgias.      INTEGUMENTARY/BREAST:  Negative for atypical moles, dry skin, pruritis, rashes, breast masses, and nipple discharge.      NEUROLOGICAL:  Negative for dizziness and headaches.      PSYCHIATRIC:  Negative for anxiety, depression, and sleep disturbances.          PMH/FMH/SH:     Last Reviewed on 10/25/2018 02:16 PM by Shnaika Crawford    Past Medical History: OA knees    HTN    hypercholesterolemia    hemorrhoids    TKR    varicose veins    fecal incontinence                 PAST MEDICAL HISTORY             GYNECOLOGICAL HISTORY:        Last mammogram was 11/3/17; No history of abnormal mammograms         CURRENT MEDICAL PROVIDERS:    Ophthalmologist: Hui    Orthopedist: [facility]    Dentist-Dr June         PREVENTIVE HEALTH MAINTENANCE             BONE DENSITY: was last done 10/26/16 with normal results     COLORECTAL CANCER SCREENING: Up to date (colonoscopy q10y; sigmoidoscopy q5y; Cologuard q3y) was last done 14, Results are in chart; colonoscopy with normal results     EYE EXAM: was last done 2017 wears glasses     Hepatitis C Medicare Screening: was last done      MAMMOGRAM: Done within last 2 years and results in are chart was  last done 11-3-17 with normal results         PAST MEDICAL HISTORY         Positive for    Dr Jay;         PAST MEDICAL HISTORY                 ADVANCED DIRECTIVES: None         Surgical History:         Appendectomy    Tonsillectomy/Adenoidectomy      Hysterectomy: With  L oophrectomy;     giant cell tumor removal 2007 and 2011, 2014 - L thumb;    BTL;     S/P REMOVAL GIANT CELL CYST L THUMB 8/2014 [facility]         Tobacco/Alcohol/Supplements:     Last Reviewed on 10/25/2018 02:16 PM by Shanika Crawford    Tobacco: She has never smoked.  Non-drinker         Substance Abuse History:     Last Reviewed on 11/17/2015 11:02 AM by Essence Ricks        Mental Health History:     Last Reviewed on 11/17/2015 11:02 AM by Essence Ricks        Communicable Diseases (eg STDs):     Last Reviewed on 11/17/2015 11:02 AM by Essence Ricks            Immunizations:     Td adult 2/2/2008     Td adult 9/15/2011     Hep A, unspecified formulation 6/14/2018     zzPrevnar-13 1/11/2016     Influenza A (H1N1), IM (3+ years) Monovalent 12/18/2009     Fluzone High-Dose pf (>=65 yr) 10/8/2016     Fluzone High-Dose pf (>=65 yr) 10/4/2017     Influenza Virus Vaccine, unspecified formulation 9/15/2011     PNEUMOVAX 23 (Pneumococcal PPV23) 12/6/2013     Zostavax (Zoster live) 6/11/2013         Allergies:     Last Reviewed on 4/11/2018 02:18 PM by Katherine Hobbs      No Known Drug Allergies.         Current Medications:     Last Reviewed on 4/11/2018 02:18 PM by Katherine Hobbs    Lisinopril/Hydrochlorothiazide 10mg/12.5mg Tablet one a day     Lipitor 10mg Tablet Take 1 tablet(s) by mouth daily     Zaditor 0.025% Ophthalmic Solution 1 gtts to both eyes QD PRN     B Complex QOD     Vit D 3         OBJECTIVE:        Vitals:         Current: 10/25/2018 1:42:50 PM    Ht:  5 ft, 3 in;  Wt: 193.2 lbs;  BMI: 34.2    T: 97.8 F (oral);  BP: 124/94 mm Hg (right arm, sitting);  P: 67 bpm (right arm (BP Cuff), sitting);  sCr: 0.73 mg/dL;  GFR: 78.77         Exams:     PHYSICAL EXAM:     GENERAL: vital signs recorded - well developed, well nourished;  no apparent distress;     EYES: PERRL, EOMI     E/N/T: EARS: external auditory canal occluded by cerumen bilaterally;  OROPHARYNX:  normal mucosa, dentition, gingiva, and posterior pharynx;     NECK: range of motion is normal; thyroid is non-palpable;     RESPIRATORY: normal respiratory rate and pattern with no distress; normal breath sounds with no rales, rhonchi, wheezes or rubs;     CARDIOVASCULAR: normal rate; rhythm is regular;  no systolic murmur; no edema;     GASTROINTESTINAL: nontender; normal bowel sounds; no organomegaly;     BREAST/INTEGUMENT: BREASTS: breast exam is normal without masses, skin changes, or nipple discharge;     MUSCULOSKELETAL:  Normal range of motion, strength and tone;     NEUROLOGICAL:  cranial nerves, motor and sensory function, reflexes, gait and coordination are all intact;     PSYCHIATRIC:  appropriate affect and demeanor; normal speech pattern; grossly normal memory;         Procedures:     Vaccination against other viral diseases, Influenza     1. Influenza high dose 0.5 ml unit dose, cmh, ABN signed given IM in the right upper arm;  lot number [id number]; expires 6/4/19             ASSESSMENT:           715.16   M17.0  Osteoarthritis of knee              DDx:     401.1   I10  Essential hypertension              DDx:     Hypercholesterolemia    272.0   E78.00  Hypercholesterolemia              DDx:     477.9   J30.9  Allergies              DDx:     280.9   D50.9  Iron deficiency anemia              DDx:     V76.12   Z12.31  Screening mammogram - other              DDx:     V04.81   Z23  Vaccination against other viral diseases, Influenza              DDx:     380.4   H61.23  External cerumen impaction              DDx:         ORDERS:         Meds Prescribed:       Refill of: Lisinopril/Hydrochlorothiazide 10mg/12.5mg Tablet one a day  #90 (Ninety) tablet(s) Refills: 1       Refill of:  Lipitor (Atorvastatin Calcium) 10mg Tablet Take 1 tablet(s) by mouth daily  #90 (Ninety) tablet(s) Refills: 1       Refill of: Fexofenadine HCl 180mg Tablet 1 tab daily  #90 (Ninety) tablet(s) Refills: 1       Refill of: Fluticasone Propionate 50mcg/1actuation Nasal Spray 2 spray(s) in each nostril daily  #16 (Sixteen) gm Refills: 5         Radiology/Test Orders:       [id number]  Screening mammography bi 2-view inc CAD  (Send-Out)           Lab Orders:       [id number]  [address] CBC w/o diff  (Send-Out)           Procedures Ordered:       [id number]  Fluzone High Dose  (In-House)         85063  Immunization administration; one vaccine  (In-House)           Other Orders:         Administration of influenza virus vaccine (x1)                 PLAN:          Osteoarthritis of knee stable on meloxicam,sees Dr Carmichael and s/p R TKR and given 2 steroid injections in L knee for OA         RECOMMENDATIONS given include: try NSAID, patient  is told to  watch for upset stomach, PUD/GI bleeding and aware of increased risk of CAD.           Essential hypertension stable on meds, due for labs           Prescriptions:       Refill of: Lisinopril/Hydrochlorothiazide 10mg/12.5mg Tablet one a day  #90 (Ninety) tablet(s) Refills: 1       Refill of: Lipitor (Atorvastatin Calcium) 10mg Tablet Take 1 tablet(s) by mouth daily  #90 (Ninety) tablet(s) Refills: 1          Hypercholesterolemia well controlled, labs are great          Allergies stable on fexofenadine and flonase           Prescriptions:       Refill of: Fexofenadine HCl 180mg Tablet 1 tab daily  #90 (Ninety) tablet(s) Refills: 1       Refill of: Fluticasone Propionate 50mcg/1actuation Nasal Spray 2 spray(s) in each nostril daily  #16 (Sixteen) gm Refills: 5          Iron deficiency anemia needs repeat cbc     LABORATORY:  Labs ordered to be performed today include CBC W/O DIFF.            Orders:       [id number]  [address] CBC w/o diff  (Send-Out)            Screening mammogram - other            Orders:       [id number]  Screening mammography bi 2-view inc CAD  (Send-Out)            Vaccination against other viral diseases, Influenza           Orders:       [id number]  Fluzone High Dose  (In-House)                     Administration of influenza virus vaccine (x1)       95064  Immunization administration; one vaccine  (In-House)            External cerumen impaction start PTC debrox daily for a week             Other Orders:       [id number]  Office/outpatient visit; established patient, level 4  (In-House)           CHARGE CAPTURE:           Primary Diagnosis:     715.16 Osteoarthritis of knee            M17.0    Bilateral primary osteoarthritis of knee    401.1 Essential hypertension            I10    Essential (primary) hypertension    Hypercholesterolemia    272.0 Hypercholesterolemia            E78.00    Pure hypercholesterolemia, unspecified    477.9 Allergies            J30.9    Allergic rhinitis, unspecified    280.9 Iron deficiency anemia            D50.9    Iron deficiency anemia, unspecified    V76.12 Screening mammogram - other            Z12.31    Encounter for screening mammogram for malignant neoplasm of breast    V04.81 Vaccination against other viral diseases, Influenza            Z23    Encounter for immunization              Orders:          02029   Fluzone High Dose  (In-House)                                           Administration of influenza virus vaccine (x1)           04129   Immunization administration; one vaccine  (In-House)           380.4 External cerumen impaction            H61.23    Impacted cerumen, bilateral        Other Orders:           93055   Office/outpatient visit; established patient, level 4  (In-House)

## 2021-05-18 NOTE — PROGRESS NOTES
Antolin, Ada X. 1950     Office/Outpatient Visit    Visit Date: Thu, Oct 17, 2019 10:32 am    Provider: Shanika Crawford MD (Assistant: Erma Sauceda MA)    Location: [facility]        Electronically signed by Shanika Crawford MD on  10/23/2019 09:28:50 AM                             SUBJECTIVE:        CC: routine med refill and breast exam         HPI:     she is in today for her breast exam and mammo set up         Concerning hypercholesterolemia, current treatment includes Lipitor and a low cholesterol/low fat diet.  Compliance with treatment has been good; she maintains her low cholesterol diet.  She denies experiencing any hypercholesterolemia related symptoms.  Most recent lab tests include Creatinine, Serum:  0.71 (mg/dl) (04/17/2019), Glom Filt Rate, Est:  >60 (ml/min/1.73m2) (04/17/2019), Alkaline Phosphatase, Serum:  91 (U/L) (04/17/2019), ALT (SGPT):  17 (U/L) (04/17/2019), AST (SGOT):  17 (U/L) (04/17/2019), Total Cholesterol:  161 (mg/dL) (04/17/2019), HDL:  66 (mg/dL) (04/17/2019), Triglycerides:  90 (mg/dL) (04/17/2019), LDL:  77 (mg/dL) (04/17/2019), Hemoglobin:  12.50 (gm/dl) (04/17/2019), Hematocrit:  38.0 (%) (04/17/2019).          Dx with essential hypertension; this was first diagnosed more than 5 years ago.  She is not using any nonpharmacologic treatment modalities.  Her current cardiac medication regimen includes a diuretic and an ACE inhibitor.  Ms. Dangelo does not check her blood pressure other than at her clinic appointments.  She is tolerating the medication well without side effects.  Compliance with treatment has been good; she takes her medication as directed, maintains her diet and exercise regimen, and follows up as directed.      ROS:     CONSTITUTIONAL:  Negative for chills, fatigue, fever, and weight change.      E/N/T:  Negative for hearing problems, E/N/T pain, congestion, rhinorrhea, epistaxis, hoarseness, and dental problems.      CARDIOVASCULAR:  Negative for chest  pain, palpitations, tachycardia, orthopnea, and edema.      RESPIRATORY:  Negative for cough, dyspnea, and hemoptysis.      GASTROINTESTINAL:  Negative for abdominal pain, heartburn, constipation, diarrhea, and stool changes.      MUSCULOSKELETAL:  Negative for arthralgias, back pain, and myalgias.      INTEGUMENTARY/BREAST:  Negative for atypical moles, dry skin, pruritis, rashes, breast masses, and nipple discharge.      NEUROLOGICAL:  Negative for dizziness and headaches.      PSYCHIATRIC:  Negative for anxiety, depression, and sleep disturbances.          PMH/FMH/SH:     Last Reviewed on 10/17/2019 11:05 AM by Shanika Crawford    Past Medical History: OA knees    HTN    hypercholesterolemia    hemorrhoids    TKR    varicose veins    fecal incontinence                 PAST MEDICAL HISTORY             GYNECOLOGICAL HISTORY:        Last mammogram was 11/3/17; No history of abnormal mammograms         CURRENT MEDICAL PROVIDERS:    Ophthalmologist: Hui    Orthopedist: [facility]    Dentist-Dr June         PREVENTIVE HEALTH MAINTENANCE             BONE DENSITY: was last done 10/26/16 with normal results     COLORECTAL CANCER SCREENING: Up to date (colonoscopy q10y; sigmoidoscopy q5y; Cologuard q3y) was last done 14, Results are in chart; colonoscopy with normal results     EYE EXAM: was last done 2018 wears glasses     Hepatitis C Medicare Screening: was last done 2008     MAMMOGRAM: Done within last 2 years and results in are chart was last done 2018 with normal results     PAP SMEAR: No longer indicated due to age and history         PAST MEDICAL HISTORY         Positive for    Dr Jay;         PAST MEDICAL HISTORY                 ADVANCED DIRECTIVES: None         Surgical History:         Appendectomy    Tonsillectomy/Adenoidectomy      Hysterectomy: With  L oophrectomy;     giant cell tumor removal  and , 2014 - L thumb;    BTL;     S/P REMOVAL  GIANT CELL CYST L THUMB 8/2014 [facility]         Tobacco/Alcohol/Supplements:     Last Reviewed on 10/17/2019 11:05 AM by Shanika Crawford    Tobacco: She has never smoked.  Non-drinker         Substance Abuse History:     Last Reviewed on 11/17/2015 11:02 AM by Essence Ricks        Mental Health History:     Last Reviewed on 11/17/2015 11:02 AM by Essence Ricks        Communicable Diseases (eg STDs):     Last Reviewed on 11/17/2015 11:02 AM by Essence Ricks            Immunizations:     Td adult 2/2/2008     Td adult 9/15/2011     Hep A, unspecified formulation 6/14/2018     zzPrevnar-13 1/11/2016     Influenza A (H1N1), IM (3+ years) Monovalent 12/18/2009     Fluzone High-Dose pf (>=65 yr) 10/8/2016     Fluzone High-Dose pf (>=65 yr) 10/4/2017     Fluzone High-Dose pf (>=65 yr) 10/25/2018     Influenza Virus Vaccine, unspecified formulation 9/15/2011     PNEUMOVAX 23 (Pneumococcal PPV23) 12/6/2013     Zostavax (Zoster live) 6/11/2013         Allergies:     Last Reviewed on 4/17/2019 10:03 AM by Nury Goldstein      No Known Drug Allergies.         Current Medications:     Last Reviewed on 4/17/2019 10:05 AM by Nury Goldstein    Lipitor 10mg Tablet Take 1 tablet(s) by mouth daily     Lisinopril/Hydrochlorothiazide 10mg/12.5mg Tablet one a day     Fluticasone Propionate 50mcg/1actuation Nasal Spray 2 spray(s) in each nostril daily     Allegra-D 24 Hour Tablets, Extended Release 1 tab daily     Meloxicam 15mg Tablet TAKES HALF OF A 15 DAILY     Zaditor 0.025% Ophthalmic Solution 1 gtts to both eyes QD PRN     B Complex QOD     Vit D 3         OBJECTIVE:        Vitals:         Current: 10/17/2019 10:36:54 AM    Ht:  5 ft, 3 in;  Wt: 194 lbs;  BMI: 34.4    T: 98.6 F (oral);  BP: 132/70 mm Hg (right arm, sitting);  P: 64 bpm (right arm (BP Cuff), sitting);  sCr: 0.71 mg/dL;  GFR: 80.01        Exams:     PHYSICAL EXAM:     GENERAL: vital signs recorded - well developed, well nourished;  no apparent distress;     EYES:  PERRL, EOMI     E/N/T:  normal EACs, TMs, nasal/oral mucosa, teeth, gingiva, and oropharynx;     NECK: range of motion is normal; thyroid is non-palpable;     RESPIRATORY: normal respiratory rate and pattern with no distress; normal breath sounds with no rales, rhonchi, wheezes or rubs;     CARDIOVASCULAR: normal rate; rhythm is regular;  no systolic murmur; no edema;     GASTROINTESTINAL: nontender; normal bowel sounds; no organomegaly;     BREAST/INTEGUMENT: BREASTS: breast exam is normal without masses, skin changes, or nipple discharge;     MUSCULOSKELETAL:  Normal range of motion, strength and tone;     NEUROLOGICAL:  cranial nerves, motor and sensory function, reflexes, gait and coordination are all intact;     PSYCHIATRIC:  appropriate affect and demeanor; normal speech pattern; grossly normal memory;         Procedures:     Vaccination against other viral diseases, Influenza     1. Influenza high dose 0.5 ml unit dose, ad, ABN signed given IM in the right upper arm; administered by ad;  lot number [id number]; expires 5/16/20 Regarding contraindications to an Influenza vaccine: Denies moderate/severe illness with/without fever; serious reaction to eggs, egg proteins, gentamicin, gelatin, arginine, neomycin or polymixin; serious reaction after recieving previous influenza vaccines; and history of Guillain-[place] Syndrome.              ASSESSMENT:           401.1   I10  Essential hypertension              DDx:     Hypercholesterolemia    272.0   E78.00  Hypercholesterolemia              DDx:     477.9   J30.9  Allergies              DDx:     V76.12   Z12.31  Screening mammogram - other              DDx:     V04.81   Z23  Vaccination against other viral diseases, Influenza              DDx:     715.16   M17.0  Osteoarthritis of knee              DDx:     627.2   E66.3  Post-menopausal state              DDx:         ORDERS:         Meds Prescribed:       Refill of: Lipitor (Atorvastatin Calcium) 10mg Tablet Take 1  tablet(s) by mouth daily  #90 (Ninety) tablet(s) Refills: 1       Refill of: Lisinopril/Hydrochlorothiazide 10mg/12.5mg Tablet one a day  #90 (Ninety) tablet(s) Refills: 1       Refill of: Fluticasone Propionate 50mcg/1actuation Nasal Spray 2 spray(s) in each nostril daily  #48 (Forty Eight) gm Refills: 1         Radiology/Test Orders:       [id number]  Screening mammography bi 2-view inc CAD  (Send-Out)         54474  DXA, bone density study, 1 or more sites; axial skeleton (eg hips, pelvis, spine)  (Send-Out)           Lab Orders:       APPTO  Appointment need  (In-House)         65777  HTN - [facility] CMP AND LIPID: [id number], [id number]  (Send-Out)           Procedures Ordered:       [id number]  Fluzone High Dose  (In-House)         10211  Immunization administration; one vaccine  (In-House)           Other Orders:                   Administration of influenza virus vaccine (x1)                 PLAN:          Essential hypertension stable on meds, due for labs     LABORATORY:  Labs ordered to be performed today include HTN/Lipid Panel: CMP, Lipid.            Prescriptions:       Refill of: Lipitor (Atorvastatin Calcium) 10mg Tablet Take 1 tablet(s) by mouth daily  #90 (Ninety) tablet(s) Refills: 1       Refill of: Lisinopril/Hydrochlorothiazide 10mg/12.5mg Tablet one a day  #90 (Ninety) tablet(s) Refills: 1       Refill of: Fluticasone Propionate 50mcg/1actuation Nasal Spray 2 spray(s) in each nostril daily  #48 (Forty Eight) gm Refills: 1           Orders:       [id number]  HTN - [facility] CMP AND LIPID: [id number], [id number]  (Send-Out)            Hypercholesterolemia well controlled, labs are great          Allergies stable on fexofenadine and flonase          Screening mammogram - other           Orders:       [id number]  Screening mammography bi 2-view inc CAD  (Send-Out)            Vaccination against other viral diseases, Influenza           Orders:       [id number]  Fluzone High Dose  (In-House)                     Administration of influenza  virus vaccine (x1)       29204  Immunization administration; one vaccine  (In-House)            Osteoarthritis of knee stable on meloxicam sparingly and rarely and steroid shots in L knee,sees Dr Carmichael and s/p R TKR          Post-menopausal state         FOLLOW-UP: Schedule a follow-up visit in 6 months.:.:for Medicare Wellness Visit           Orders:       96106  DXA, bone density study, 1 or more sites; axial skeleton (eg hips, pelvis, spine)  (Send-Out)         APPTO  Appointment need  (In-House)               Other Orders:       35143  Office/outpatient visit; established patient, level 4  (In-House)           Patient Recommendations:        For  Post-menopausal state:     Schedule a follow-up visit in 6 months.                APPOINTMENT INFORMATION:        Monday Tuesday Wednesday Thursday Friday Saturday Sunday            Time:___________________AM  PM   Date:_____________________             CHARGE CAPTURE:           Primary Diagnosis:     401.1 Essential hypertension            I10    Essential (primary) hypertension    Hypercholesterolemia    272.0 Hypercholesterolemia            E78.00    Pure hypercholesterolemia, unspecified    477.9 Allergies            J30.9    Allergic rhinitis, unspecified    V76.12 Screening mammogram - other            Z12.31    Encounter for screening mammogram for malignant neoplasm of breast    V04.81 Vaccination against other viral diseases, Influenza            Z23    Encounter for immunization              Orders:          32934   Fluzone High Dose  (In-House)                                           Administration of influenza virus vaccine (x1)           07207   Immunization administration; one vaccine  (In-House)           715.16 Osteoarthritis of knee            M17.0    Bilateral primary osteoarthritis of knee    627.2 Post-menopausal state            E66.3    Overweight              Orders:          APPTO   Appointment need  (In-House)               Other  Orders:           14055   Office/outpatient visit; established patient, level 4  (In-House)

## 2021-05-18 NOTE — PROGRESS NOTES
Simi Dangelo  1950     Office/Outpatient Visit    Visit Date: Mon, Oct 19, 2020 09:55 am    Provider: Shanika Crawford MD (Assistant: Ann Goyal LPN)    Location: [facility]        Electronically signed by Shanika Crawford MD on  10/21/2020 11:06:22 AM                             Subjective:        CC: Ms. Dangelo is a 70 year old Black or  female.  3 month follow up, plus breast exam.;         HPI:           Dx with pure hypercholesterolemia, unspecified; current treatment includes Lipitor and a low cholesterol/low fat diet.  Compliance with treatment has been good; she maintains her low cholesterol diet.  She denies experiencing any hypercholesterolemia related symptoms.  Most recent lab tests include TSH:  1.430 (mIU/L) (04/13/2018), Hemoglobin:  12.50 (gm/dl) (04/17/2019), Hematocrit:  38.0 (%) (04/17/2019), Total Cholesterol:  154 (mg/dL) (06/02/2020), HDL:  56 (mg/dL) (06/02/2020), Triglycerides:  59 (mg/dL) (06/02/2020), LDL:  86 (mg/dL) (06/02/2020), Creatinine, Serum:  0.75 (mg/dl) (06/02/2020), Glom Filt Rate, Est:  >60 (ml/min/1.73m2) (06/02/2020), Alkaline Phosphatase, Serum:  96 (U/L) (06/02/2020), ALT (SGPT):  17 (U/L) (06/02/2020), AST (SGOT):  21 (U/L) (06/02/2020).            Concerning essential (primary) hypertension, this was first diagnosed more than 5 years ago.  She is not using any nonpharmacologic treatment modalities.  Her current cardiac medication regimen includes a diuretic and an ACE inhibitor.  Compliance with treatment has been good; she takes her medication as directed, maintains her diet and exercise regimen, and follows up as directed.  She is tolerating the medication well without side effects.  Ms. Dangelo does not check her blood pressure other than at her clinic appointments.        chronic anxiety, she is overall doing well on allegra 180 mg and her BP and allergies are well controlled on meds    ROS:     CONSTITUTIONAL:  Negative for  chills and fever.      E/N/T:  Negative for hearing problems, E/N/T pain, congestion, rhinorrhea, epistaxis, hoarseness, and dental problems.      CARDIOVASCULAR:  Negative for chest pain, palpitations, tachycardia, orthopnea, and edema.      RESPIRATORY:  Negative for cough, dyspnea, and hemoptysis.      GASTROINTESTINAL:  Negative for abdominal pain, heartburn, constipation, diarrhea, and stool changes.      MUSCULOSKELETAL:  Negative for arthralgias, back pain, and myalgias.      INTEGUMENTARY/BREAST:  Negative for atypical moles, dry skin, pruritis, rashes, breast masses, and nipple discharge.      NEUROLOGICAL:  Negative for dizziness and headaches.      PSYCHIATRIC:  Negative for anxiety, depression, and sleep disturbances.          Past Medical History / Family History / Social History:         Last Reviewed on 10/19/2020 10:14 AM by Shanika Crawford    Past Medical History: OA knees    HTN    hypercholesterolemia    hemorrhoids    TKR    varicose veins    fecal incontinence                 PAST MEDICAL HISTORY             GYNECOLOGICAL HISTORY:        Last mammogram was 11/3/17; No history of abnormal mammograms         CURRENT MEDICAL PROVIDERS:    Ophthalmologist: Hui    Orthopedist: [facility]    Dentist-Dr June         PREVENTIVE HEALTH MAINTENANCE             BONE DENSITY: was last done 19 with normal results     COLORECTAL CANCER SCREENING: Up to date (colonoscopy q10y; sigmoidoscopy q5y; Cologuard q3y) was last done 14, Results are in chart; colonoscopy with normal results     DENTAL CLEANING: was last done 2020     EYE EXAM: was last done 2019 wears glasses     Hepatitis C Medicare Screening: was last done 2008     MAMMOGRAM: Done within last 2 years and results in are chart was last done 19 with normal results     PAP SMEAR: No longer indicated due to age and history         PAST MEDICAL HISTORY         Positive for      Misty;         PAST MEDICAL HISTORY                 ADVANCED DIRECTIVES: None         Surgical History:         Appendectomy    Tonsillectomy/Adenoidectomy     Hysterectomy: With  L oophrectomy;     giant cell tumor removal 2007 and 2011, 2014 - L thumb;    BTL;     S/P REMOVAL GIANT CELL CYST L THUMB 8/2014 [facility]         Family History:         Positive for Pulmonary Embolism ( brother ).          Tobacco/Alcohol/Supplements:     Last Reviewed on 10/19/2020 10:01 AM by Ann Goyal    Tobacco: She has never smoked.  Non-drinker         Substance Abuse History:     Last Reviewed on 11/17/2015 11:02 AM by Essence Ricks        Mental Health History:     Last Reviewed on 11/17/2015 11:02 AM by Essence Ricks        Communicable Diseases (eg STDs):     Last Reviewed on 11/17/2015 11:02 AM by Essence Ricks        Allergies:     Last Reviewed on 6/15/2020 11:11 AM by Spurling, Sarah C    No Known Allergies.        Current Medications:     Last Reviewed on 6/15/2020 11:14 AM by Spurling, Sarah C    Lipitor 10 mg oral tablet [Take 1 tablet(s) by mouth daily]    lisinopriL-hydrochlorothiazide 10-12.5 mg oral tablet [one a day]    B Complex QOD     Vit D 3     Meloxicam 15 mg oral tablet [TAKES HALF OF A 15 DAILY]    Zaditor 0.025 % (0.035 %) ophthalmic (eye) Drops [ 1 gtts to both eyes QD PRN]    Allegra-D 24 Hour 180-240 mg oral Tablet, Extended Release 24 hr [1 tab daily]    fluticasone propionate 50 mcg/actuation Intranasal Spray, Suspension [inhale 1 spray (50 mcg) in each nostril by intranasal route 2 times per day]        Objective:        Vitals:         Current: 10/19/2020 10:02:15 AM    Ht:  5 ft, 3 in;  Wt: 187 lbs;  BMI: 33.1T: 96.6 F (temporal);  BP: 136/78 mm Hg (right arm, sitting);  P: 74 bpm (right arm (BP Cuff), sitting);  sCr: 0.75 mg/dL;  GFR: 73.52        Exams:     PHYSICAL EXAM:     GENERAL: vital signs recorded - well developed, well nourished;  no apparent distress;     EYES: PERRL, EOMI     E/N/T:   normal EACs, TMs, nasal/oral mucosa, teeth, gingiva, and oropharynx;     NECK: range of motion is normal; thyroid is non-palpable;     RESPIRATORY: normal respiratory rate and pattern with no distress; normal breath sounds with no rales, rhonchi, wheezes or rubs;     CARDIOVASCULAR: normal rate; rhythm is regular;  no systolic murmur; no edema;     GASTROINTESTINAL: nontender; normal bowel sounds; no organomegaly;     BREAST/INTEGUMENT: BREASTS: breast exam is normal without masses, skin changes, or nipple discharge;     MUSCULOSKELETAL:  Normal range of motion, strength and tone;     NEUROLOGICAL:  cranial nerves, motor and sensory function, reflexes, gait and coordination are all intact;     PSYCHIATRIC:  appropriate affect and demeanor; normal speech pattern; grossly normal memory;         Assessment:         Z13.31   Encounter for screening for depression       Z12.31   Encounter for screening mammogram for malignant neoplasm of breast       E78.00   Pure hypercholesterolemia, unspecified       I10   Essential (primary) hypertension       T78.40XD   Allergy, unspecified, subsequent encounter       E66.01   Morbid (severe) obesity due to excess calories           ORDERS:         Meds Prescribed:       [Refilled] fexofenadine 180 mg oral tablet [take 1 tablet (180 mg) by oral route once daily], #90 (ninety) tablets, Refills: 0 (zero)       [Refilled] lisinopriL-hydrochlorothiazide 10-12.5 mg oral tablet [one a day], #90 (ninety) tablets, Refills: 2 (two)       [Refilled] fluticasone propionate 50 mcg/actuation Intranasal Spray, Suspension [inhale 1 spray (50 mcg) in each nostril by intranasal route 2 times per day], #3 (three) each, Refills: 2 (two)         Radiology/Test Orders:       3017F  Colorectal CA screen results documented and reviewed (PV)  (In-House)            99443  Screening digital breast tomosynthesis bi  (Send-Out)              Lab Orders:       [id number]  [facility]NLP - [facility] CMP AND LIPID: [id number], [id number]   (Send-Out)              Other Orders:         Depression screen negative  (In-House)            1101F  Pt screen for fall risk; document no falls in past year or only 1 fall w/o injury in past year (NIKKI)  (In-House)              Screening mammogram results documented  (Send-Out)                      Plan:         Encounter for screening for depression    MIPS PHQ-9 Depression Screening: Completed form scanned and in chart; Total Score 0; Negative Depression Screen           Orders:         Depression screen negative  (In-House)            1101F  Pt screen for fall risk; document no falls in past year or only 1 fall w/o injury in past year (NIKKI)  (In-House)              Screening mammogram results documented  (Send-Out)            3017F  Colorectal CA screen results documented and reviewed (PV)  (In-House)              Encounter for screening mammogram for malignant neoplasm of breast          Orders:       [id number]  Screening digital breast tomosynthesis bi  (Send-Out)              Pure hypercholesterolemia, unspecifiedstable on meds, needs med refills          Prescriptions:       [Refilled] fexofenadine 180 mg oral tablet [take 1 tablet (180 mg) by oral route once daily], #90 (ninety) tablets, Refills: 0 (zero)       [Refilled] lisinopriL-hydrochlorothiazide 10-12.5 mg oral tablet [one a day], #90 (ninety) tablets, Refills: 2 (two)       [Refilled] fluticasone propionate 50 mcg/actuation Intranasal Spray, Suspension [inhale 1 spray (50 mcg) in each nostril by intranasal route 2 times per day], #3 (three) each, Refills: 2 (two)         Essential (primary) hypertension    LABORATORY:  Labs ordered to be performed today include HTN/Lipid Panel: CMP, Lipid.      RECOMMENDATIONS given include: avoidance of caffeine, avoidance of cigarette smoke, keep blood pressure log as directed, healthy carb, high healthy protein and high fiber diet, avoid salt in diet, f/u every 6 months for BP checks and labs, and  exercise 30 min 3-4 days a week.            Orders:       [id number]  HTNLP - [facility] CMP AND LIPID: [id number], [id number]  (Send-Out)              Allergy, unspecified, subsequent encounterwell controlled on allegra        Morbid (severe) obesity due to excess caloriesshe has lost 10 #s since June-Im so proud of her!!!!            Patient Recommendations:        For  Essential (primary) hypertension:    Try to avoid or reduce the amount of caffeine intake. Avoid cigarette smoke. Keep a daily blood pressure log and report elevated blood pressure to provider as directed. Drink plenty of fluids.  Fever increases the loss of fluids and can lead to dehydration.              Charge Capture:         Primary Diagnosis:     Z13.31  Encounter for screening for depression           Orders:      [id number]  Office/outpatient visit; established patient, level 4  (In-House)              Depression screen negative  (In-House)            1101F  Pt screen for fall risk; document no falls in past year or only 1 fall w/o injury in past year (NIKKI)  (In-House)            3017F  Colorectal CA screen results documented and reviewed (PV)  (In-House)              Z12.31  Encounter for screening mammogram for malignant neoplasm of breast     E78.00  Pure hypercholesterolemia, unspecified     I10  Essential (primary) hypertension     T78.40XD  Allergy, unspecified, subsequent encounter     E66.01  Morbid (severe) obesity due to excess calories

## 2021-06-16 ENCOUNTER — OFFICE VISIT (OUTPATIENT)
Dept: FAMILY MEDICINE CLINIC | Age: 71
End: 2021-06-16

## 2021-06-16 ENCOUNTER — LAB (OUTPATIENT)
Dept: LAB | Facility: HOSPITAL | Age: 71
End: 2021-06-16

## 2021-06-16 VITALS
TEMPERATURE: 98.2 F | HEART RATE: 70 BPM | WEIGHT: 188.2 LBS | BODY MASS INDEX: 33.35 KG/M2 | DIASTOLIC BLOOD PRESSURE: 68 MMHG | SYSTOLIC BLOOD PRESSURE: 134 MMHG | HEIGHT: 63 IN

## 2021-06-16 DIAGNOSIS — J30.2 SEASONAL ALLERGIC RHINITIS, UNSPECIFIED TRIGGER: ICD-10-CM

## 2021-06-16 DIAGNOSIS — E78.00 PURE HYPERCHOLESTEROLEMIA: ICD-10-CM

## 2021-06-16 DIAGNOSIS — Z91.81 AT LOW RISK FOR FALL: Primary | ICD-10-CM

## 2021-06-16 DIAGNOSIS — Z00.00 HEALTH MAINTENANCE EXAMINATION: ICD-10-CM

## 2021-06-16 DIAGNOSIS — I10 ESSENTIAL HYPERTENSION: ICD-10-CM

## 2021-06-16 LAB
ALBUMIN SERPL-MCNC: 4.4 G/DL (ref 3.5–5.2)
ALBUMIN/GLOB SERPL: 1.5 G/DL
ALP SERPL-CCNC: 97 U/L (ref 39–117)
ALT SERPL W P-5'-P-CCNC: 18 U/L (ref 1–33)
ANION GAP SERPL CALCULATED.3IONS-SCNC: 9.4 MMOL/L (ref 5–15)
AST SERPL-CCNC: 21 U/L (ref 1–32)
BASOPHILS # BLD AUTO: 0.02 10*3/MM3 (ref 0–0.2)
BASOPHILS NFR BLD AUTO: 0.5 % (ref 0–1.5)
BILIRUB SERPL-MCNC: 0.7 MG/DL (ref 0–1.2)
BUN SERPL-MCNC: 10 MG/DL (ref 8–23)
BUN/CREAT SERPL: 13.5 (ref 7–25)
CALCIUM SPEC-SCNC: 9.5 MG/DL (ref 8.6–10.5)
CHLORIDE SERPL-SCNC: 99 MMOL/L (ref 98–107)
CHOLEST SERPL-MCNC: 169 MG/DL (ref 0–200)
CO2 SERPL-SCNC: 29.6 MMOL/L (ref 22–29)
CREAT SERPL-MCNC: 0.74 MG/DL (ref 0.57–1)
DEPRECATED RDW RBC AUTO: 46.4 FL (ref 37–54)
EOSINOPHIL # BLD AUTO: 0.12 10*3/MM3 (ref 0–0.4)
EOSINOPHIL NFR BLD AUTO: 2.7 % (ref 0.3–6.2)
ERYTHROCYTE [DISTWIDTH] IN BLOOD BY AUTOMATED COUNT: 14.9 % (ref 12.3–15.4)
GFR SERPL CREATININE-BSD FRML MDRD: 93 ML/MIN/1.73
GLOBULIN UR ELPH-MCNC: 3 GM/DL
GLUCOSE SERPL-MCNC: 94 MG/DL (ref 65–99)
HCT VFR BLD AUTO: 38.7 % (ref 34–46.6)
HDLC SERPL-MCNC: 61 MG/DL (ref 40–60)
HGB BLD-MCNC: 12.4 G/DL (ref 12–15.9)
IMM GRANULOCYTES # BLD AUTO: 0.01 10*3/MM3 (ref 0–0.05)
IMM GRANULOCYTES NFR BLD AUTO: 0.2 % (ref 0–0.5)
LDLC SERPL CALC-MCNC: 92 MG/DL (ref 0–100)
LDLC/HDLC SERPL: 1.5 {RATIO}
LYMPHOCYTES # BLD AUTO: 1.87 10*3/MM3 (ref 0.7–3.1)
LYMPHOCYTES NFR BLD AUTO: 42.3 % (ref 19.6–45.3)
MCH RBC QN AUTO: 27.1 PG (ref 26.6–33)
MCHC RBC AUTO-ENTMCNC: 32 G/DL (ref 31.5–35.7)
MCV RBC AUTO: 84.7 FL (ref 79–97)
MONOCYTES # BLD AUTO: 0.32 10*3/MM3 (ref 0.1–0.9)
MONOCYTES NFR BLD AUTO: 7.2 % (ref 5–12)
NEUTROPHILS NFR BLD AUTO: 2.08 10*3/MM3 (ref 1.7–7)
NEUTROPHILS NFR BLD AUTO: 47.1 % (ref 42.7–76)
PLATELET # BLD AUTO: 255 10*3/MM3 (ref 140–450)
PMV BLD AUTO: 10.1 FL (ref 6–12)
POTASSIUM SERPL-SCNC: 4.1 MMOL/L (ref 3.5–5.2)
PROT SERPL-MCNC: 7.4 G/DL (ref 6–8.5)
RBC # BLD AUTO: 4.57 10*6/MM3 (ref 3.77–5.28)
SODIUM SERPL-SCNC: 138 MMOL/L (ref 136–145)
TRIGL SERPL-MCNC: 84 MG/DL (ref 0–150)
VLDLC SERPL-MCNC: 16 MG/DL (ref 5–40)
WBC # BLD AUTO: 4.42 10*3/MM3 (ref 3.4–10.8)

## 2021-06-16 PROCEDURE — G0439 PPPS, SUBSEQ VISIT: HCPCS | Performed by: FAMILY MEDICINE

## 2021-06-16 PROCEDURE — 99214 OFFICE O/P EST MOD 30 MIN: CPT | Performed by: FAMILY MEDICINE

## 2021-06-16 PROCEDURE — 80061 LIPID PANEL: CPT | Performed by: FAMILY MEDICINE

## 2021-06-16 PROCEDURE — 80053 COMPREHEN METABOLIC PANEL: CPT | Performed by: FAMILY MEDICINE

## 2021-06-16 PROCEDURE — 36415 COLL VENOUS BLD VENIPUNCTURE: CPT | Performed by: FAMILY MEDICINE

## 2021-06-16 PROCEDURE — 85025 COMPLETE CBC W/AUTO DIFF WBC: CPT | Performed by: FAMILY MEDICINE

## 2021-06-16 RX ORDER — ATORVASTATIN CALCIUM 10 MG/1
10 TABLET, FILM COATED ORAL DAILY
COMMUNITY
End: 2021-06-16 | Stop reason: SDUPTHER

## 2021-06-16 RX ORDER — LISINOPRIL AND HYDROCHLOROTHIAZIDE 12.5; 1 MG/1; MG/1
1 TABLET ORAL DAILY
Qty: 90 TABLET | Refills: 1 | Status: SHIPPED | OUTPATIENT
Start: 2021-06-16 | End: 2022-01-13 | Stop reason: SDUPTHER

## 2021-06-16 RX ORDER — ATORVASTATIN CALCIUM 10 MG/1
10 TABLET, FILM COATED ORAL DAILY
Qty: 90 TABLET | Refills: 1 | Status: SHIPPED | OUTPATIENT
Start: 2021-06-16 | End: 2021-12-02

## 2021-06-16 RX ORDER — LISINOPRIL AND HYDROCHLOROTHIAZIDE 12.5; 1 MG/1; MG/1
TABLET ORAL
COMMUNITY
Start: 2021-03-14 | End: 2021-06-16 | Stop reason: SDUPTHER

## 2021-06-16 RX ORDER — FEXOFENADINE HCL 180 MG/1
180 TABLET ORAL DAILY
COMMUNITY
End: 2022-07-12 | Stop reason: SDUPTHER

## 2021-06-16 RX ORDER — KETOTIFEN FUMARATE 0.35 MG/ML
1 SOLUTION/ DROPS OPHTHALMIC AS NEEDED
COMMUNITY

## 2021-06-16 RX ORDER — FLUTICASONE PROPIONATE 50 MCG
SPRAY, SUSPENSION (ML) NASAL
COMMUNITY
Start: 2021-05-03 | End: 2021-06-16 | Stop reason: SDUPTHER

## 2021-06-16 RX ORDER — FLUTICASONE PROPIONATE 50 MCG
2 SPRAY, SUSPENSION (ML) NASAL DAILY
Qty: 16 G | Refills: 11 | Status: SHIPPED | OUTPATIENT
Start: 2021-06-16 | End: 2021-07-15 | Stop reason: SDUPTHER

## 2021-06-16 NOTE — PATIENT INSTRUCTIONS

## 2021-06-16 NOTE — PROGRESS NOTES
The ABCs of the Annual Wellness Visit  Subsequent Medicare Wellness Visit    Chief Complaint   Patient presents with   • Annual Exam       Subjective   History of Present Illness:  Simi Dangelo is a 71 y.o. female who presents for a Subsequent Medicare Wellness Visit.  Ms. Dangelo is here for a Medicare wellness visit.  The required HRA questions are integrated within this visit note. Family medical history and individual medical/surgical history were reviewed and updated.  A current height, weight, BMI, blood pressure, and pulse were recorded in the vitals section of the note and have been reviewed. Patient's medications, including supplements, were recorded in the chart and reviewed.  Current providers and suppliers were reviewed and updated.     Self-Assessment of Health: She rates her health as very good. She rates her confidence of being able to control/manage most of her health problems as very confident. Her physical/emotional health has limited her social activites not at all.  A review of cognitive impairment was performed, including ability to drive a car, manage finances, and any memory changes, and was found to be negative.  A review of functional ability, including bathing, dressing, walking, and urine/bowel continence as well as level of safety was performed and was found to be negative.  Falls Risk: Has had a fall with injury in the past year-her daughters dog pulled her down and she broke 2 teeth.  She denies having trouble hearing the TV/radio when others do not, having to strain to hear or understand conversations and wearing hearing aid(s).  Concerning home safety, she reports that at home she DOES have adequate lighting, a skid resistant shower/tub, functioning smoke alarms and absence of throw rugs, but not grab bars in the bath or handrails on stairs.  Physical Activity: She never excercises.; Type of diet patient normally eats is described as poor--needs improvement.  Tobacco: She has never  smoked.   Preventative Health updated today      Dx with pure hypercholesterolemia, unspecified; current treatment includes Lipitor and a low cholesterol/low fat diet.  Compliance with treatment has been good; she maintains her low cholesterol diet.  She denies experiencing any hypercholesterolemia related symptoms.           Concerning essential (primary) hypertension, this was first diagnosed more than 5 years ago.  She is not using any nonpharmacologic treatment modalities.  Her current cardiac medication regimen includes a diuretic (hctz) and an ACE (lisinopril) inhibitor.  Compliance with treatment has been good; she takes her medication as directed, maintains her diet and exercise regimen, and follows up as directed.  She is tolerating the medication well without side effects.  Her home wrist BP cuff shows good BP         chronic allergies and she is overall doing well on allegra 180 mg and flonase    ROS   CONSTITUTIONAL:  Negative for chills and fever.      CARDIOVASCULAR:  Negative for chest pain, palpitations, tachycardia, orthopnea, and edema.      RESPIRATORY:  Negative for cough, dyspnea, and hemoptysis.      GASTROINTESTINAL:  Negative for abdominal pain, heartburn, constipation, diarrhea, and stool changes.      MUSCULOSKELETAL:  Negative for arthralgias, back pain, and myalgias.      INTEGUMENTARY/BREAST:  Negative for atypical moles, dry skin, pruritis, rashes, breast masses, and nipple discharge.      NEUROLOGICAL:  Negative for dizziness and headaches.      PSYCHIATRIC:  Negative for anxiety, depression, and sleep disturbances.      HEALTH RISK ASSESSMENT    Recent Hospitalizations:  No hospitalization(s) within the last year.    Current Medical Providers:  Patient Care Team:  Shanika Crawford MD as PCP - General (Family Medicine)  Walt Jama OD (Optometry)  Luis Jay DO (Orthopedic Surgery)    Smoking Status:  Social History     Tobacco Use   Smoking Status Never Smoker    Smokeless Tobacco Never Used       Alcohol Consumption:  Social History     Substance and Sexual Activity   Alcohol Use Never       Depression Screen:   PHQ-2/PHQ-9 Depression Screening 6/16/2021   Little interest or pleasure in doing things 0   Feeling down, depressed, or hopeless 0   Total Score 0       Fall Risk Screen:  IVAN Fall Risk Assessment was completed, and patient is at HIGH risk for falls. Assessment completed on:6/16/2021    Health Habits and Functional and Cognitive Screening:  Functional & Cognitive Status 6/16/2021   Do you have difficulty preparing food and eating? No   Do you have difficulty bathing yourself, getting dressed or grooming yourself? No   Do you have difficulty using the toilet? No   Do you have difficulty moving around from place to place? No   Do you have trouble with steps or getting out of a bed or a chair? No   Current Diet Well Balanced Diet   Dental Exam Up to date   Eye Exam Up to date   Exercise (times per week) 3 times per week   Current Exercise Activities Include Walking   Do you need help using the phone?  No   Are you deaf or do you have serious difficulty hearing?  No   Do you need help with transportation? No   Do you need help shopping? No   Do you need help preparing meals?  No   Do you need help with housework?  No   Do you need help with laundry? No   Do you need help taking your medications? No   Do you need help managing money? No   Do you ever drive or ride in a car without wearing a seat belt? Yes   Have you felt unusual stress, anger or loneliness in the last month? No   Who do you live with? Alone   If you need help, do you have trouble finding someone available to you? No   Do you have difficulty concentrating, remembering or making decisions? No         Does the patient have evidence of cognitive impairment? No    Asprin use counseling:Does not need ASA (and currently is not on it)    Age-appropriate Screening Schedule:  Refer to the list below for  future screening recommendations based on patient's age, sex and/or medical conditions. Orders for these recommended tests are listed in the plan section. The patient has been provided with a written plan.    Health Maintenance   Topic Date Due   • ZOSTER VACCINE (2 of 3) 08/06/2013   • INFLUENZA VACCINE  08/01/2021   • TDAP/TD VACCINES (3 - Tdap) 09/15/2021   • DXA SCAN  11/11/2021   • LIPID PANEL  12/14/2021   • MAMMOGRAM  12/14/2022          The following portions of the patient's history were reviewed and updated as appropriate:   She  has a past medical history of Allergy, unspecified, subsequent encounter, Bilateral primary osteoarthritis of knee, Decreased white blood cell count, unspecified, Essential (primary) hypertension, Full incontinence of feces, Hemorrhoids, History of total bilateral knee replacement (TKR), Hypercholesteremia, Hypertension, Morbid (severe) obesity due to excess calories (CMS/HCC), Overweight, Pain involving joints of fingers of both hands, Presence of unspecified artificial knee joint, Pure hypercholesterolemia, Residual hemorrhoidal skin tag, and Varicose veins of bilateral lower extremities with pain.  She does not have a problem list on file.  She  has a past surgical history that includes Appendectomy; Tonsillectomy and adenoidectomy; Hysterectomy; and Tumor removal (Left, 2007, 2011, 2014).  Her family history includes Pulmonary embolism in her brother.  She  reports that she has never smoked. She has never used smokeless tobacco. She reports that she does not drink alcohol and does not use drugs.  Current Outpatient Medications   Medication Sig Dispense Refill   • atorvastatin (LIPITOR) 10 MG tablet Take 1 tablet by mouth Daily. 90 tablet 1   • fexofenadine (ALLEGRA) 180 MG tablet Take 180 mg by mouth Daily.     • fluticasone (FLONASE) 50 MCG/ACT nasal spray 2 sprays into the nostril(s) as directed by provider Daily. 16 g 11   • ketotifen (ZADITOR) 0.025 % ophthalmic solution 1  drop 2 (Two) Times a Day.     • lisinopril-hydrochlorothiazide (PRINZIDE,ZESTORETIC) 10-12.5 MG per tablet Take 1 tablet by mouth Daily. 90 tablet 1     No current facility-administered medications for this visit.     Current Outpatient Medications on File Prior to Visit   Medication Sig   • fexofenadine (ALLEGRA) 180 MG tablet Take 180 mg by mouth Daily.   • ketotifen (ZADITOR) 0.025 % ophthalmic solution 1 drop 2 (Two) Times a Day.   • [DISCONTINUED] atorvastatin (LIPITOR) 10 MG tablet Take 10 mg by mouth Daily.   • [DISCONTINUED] fluticasone (FLONASE) 50 MCG/ACT nasal spray    • [DISCONTINUED] lisinopril-hydrochlorothiazide (PRINZIDE,ZESTORETIC) 10-12.5 MG per tablet      No current facility-administered medications on file prior to visit.     She has No Known Allergies..    Outpatient Medications Prior to Visit   Medication Sig Dispense Refill   • fexofenadine (ALLEGRA) 180 MG tablet Take 180 mg by mouth Daily.     • ketotifen (ZADITOR) 0.025 % ophthalmic solution 1 drop 2 (Two) Times a Day.     • atorvastatin (LIPITOR) 10 MG tablet Take 10 mg by mouth Daily.     • fluticasone (FLONASE) 50 MCG/ACT nasal spray      • lisinopril-hydrochlorothiazide (PRINZIDE,ZESTORETIC) 10-12.5 MG per tablet        No facility-administered medications prior to visit.       There is no problem list on file for this patient.      Advanced Care Planning:  Pt does not have a living will, information provided    Review of Systems    Compared to one year ago, the patient feels her physical health is the same.  Compared to one year ago, the patient feels her mental health is the same.    Reviewed chart for potential of high risk medication in the elderly: yes  Reviewed chart for potential of harmful drug interactions in the elderly:yes    Objective         Vitals:    06/16/21 0957   BP: 134/68   BP Location: Left arm   Patient Position: Sitting   Pulse: 70   Temp: 98.2 °F (36.8 °C)   TempSrc: Oral   Weight: 85.4 kg (188 lb 3.2 oz)  "  Height: 160 cm (63\")   PainSc: 0-No pain       Body mass index is 33.34 kg/m².  Discussed the patient's BMI with her. The BMI is above average; BMI management plan is completed.  She defers dietician referral, she is working in yard and walking some    Physical Exam  Constitutional:       General: She is not in acute distress.     Appearance: She is normal weight. She is not ill-appearing.   HENT:      Head: Normocephalic.      Right Ear: Tympanic membrane normal. There is no impacted cerumen.      Left Ear: Tympanic membrane normal. There is no impacted cerumen.      Mouth/Throat:      Mouth: Mucous membranes are moist.      Pharynx: Oropharynx is clear.   Eyes:      Extraocular Movements: Extraocular movements intact.      Pupils: Pupils are equal, round, and reactive to light.   Neck:      Vascular: No carotid bruit.   Cardiovascular:      Rate and Rhythm: Normal rate and regular rhythm.      Pulses: Normal pulses.      Heart sounds: No murmur heard.     Pulmonary:      Effort: Pulmonary effort is normal.      Breath sounds: No wheezing, rhonchi or rales.   Abdominal:      General: Bowel sounds are normal.      Palpations: Abdomen is soft.      Tenderness: There is no abdominal tenderness.   Musculoskeletal:         General: No swelling. Normal range of motion.      Cervical back: No rigidity or tenderness.   Lymphadenopathy:      Cervical: No cervical adenopathy.   Skin:     General: Skin is warm and dry.   Neurological:      Mental Status: She is alert.      Gait: Gait normal.   Psychiatric:         Mood and Affect: Mood normal.         Behavior: Behavior normal.         Judgment: Judgment normal.               Assessment/Plan   Medicare Risks and Personalized Health Plan  CMS Preventative Services Quick Reference  Advance Directive Discussion  Cardiovascular risk  Depression/Dysphoria  Fall Risk  Glaucoma Risk  Hearing Problem  Immunizations Discussed/Encouraged (specific immunizations; Shingrix " )  Inactivity/Sedentary  Obesity/Overweight   Osteoporosis Risk  Urinary Incontinence    The above risks/problems have been discussed with the patient.  Pertinent information has been shared with the patient in the After Visit Summary.  Follow up plans and orders are seen below in the Assessment/Plan Section.  Component   Ref Range & Units 6 mo ago   Glucose   65 - 99 mg/dL 92    BUN   5 - 25 mg/dL 15    Creatinine   0.50 - 0.90 mg/dL 0.61    BUN/Creatinine Ratio   6 - 20  25High     GFR   >60 mL/min/ >60    Comment: Interpretative Data:   ------------------------------------   STAGE                  GFR   Stage 1                90 mL/min or greater   Stage 2                60-89 mL/min   Stage 3                30-59 mL/min   Stage 4                15-29 mL/min   Value <60 mL/min for 3 or more months is defined as CKD.    Sodium   135 - 147 mmol/L 138    Potassium   3.5 - 5.3 mmol/L 3.9    Chloride   99 - 111 mmol/L 99    CO2   22 - 32 mmol/L 28    Anion Gap   8 - 19 mmol/L 15    OSMOLALITY CALC   273 - 304 286    Total Protein   6.3 - 8.2 g/dL 7.0    Comment: If Patient is receiving dextran as a blood volume expander   result may show a potential interference.    Albumin   3.5 - 5.0 g/dL 4.1    Globulin   2.0 - 3.5 g/dL 2.9    A/G Ratio   1.4 - 2.6 1.4    Calcium   8.7 - 10.4 mg/dL 9.3    Alkaline Phosphatase   43 - 160 U/L 114    ALT (SGPT)   10 - 40 U/L 20    AST (SGOT)   15 - 50 U/L 21    Total Bilirubin   0.20 - 1.30 mg/dL 0.70    Triglycerides   40 - 150 mg/dL 68    Comment: <150 mg/dL-Normal   150-199 mg/dL-Borderline High   200-499 mg/dL-High   >500 mg/dL- Very High    Total Cholesterol   107 - 200 mg/dL 178    Comment: <200 mg/dL-Desirable   200-239 mg/dL-Borderline High   >240 mg/dL-High   >500 mg/dL-Very High    HDL Cholesterol   40 - 60 mg/dL 65High     Comment: <40 mg/dL-Low   >60 mg/dL- Desirable    Chol/HDL Ratio   3.0 - 6.0 NA 2.7Low     LDL Cholesterol    70 - 100 mg/dL 99    Comment: Recommended   LDL Levels   <100 mg/dL-Optimal   100-129 mg/dL-Near Optimal/above optimal   130-159 mg/dL-Borderline High   160-189 mg/dL-High   >190 mg/dL-Very High    VLDL Cholesterol   5 - 37 mg/dL 14          Specimen Collected: 12/14/20 12:37   Last Resulted: 12/14/20 19:09          Diagnoses and all orders for this visit:    1. At low risk for fall (Primary)    2. Pure hypercholesterolemia-well controlled on meds, labs reviewed with pt today, meds refilled  -     Lipid Panel    3. Essential hypertension-borderline, well controlled on meds, labs ordered  -     Comprehensive Metabolic Panel  -     CBC & Differential    4. Health maintenance examination   MEDICARE WELLNESS EXAM-SHE IS UTD ON COLONOSCOPY/MAMMOGRAM/DEXA (mammo and dexa due in 12/2021)/PELVIC, , UTD ON FLU/SHINGLES/COVID/PNEUMOVAX/PREVNAR/TDAP,RECOMMEND SHINGRIX AND YEARLY FLU VACCINATION,  NO FALL RISK, NO MEMORY ISSUES OR DEPRESSION,SHE IS ABLE TO DRIVE AND PERFORM ADL'S/FINANCES, HEARING IS ADEQUATE, SHE HAS A HA ON  A LIVING WILL AND A SAFETY HANDOUT WAS GIVEN TO HER. RECOMMEND YEARLY EYE EXAMS, MD LIST UPDATED     5. Seasonal allergic rhinitis, unspecified trigger-stable on allegra and flonase    Other orders  -     atorvastatin (LIPITOR) 10 MG tablet; Take 1 tablet by mouth Daily.  Dispense: 90 tablet; Refill: 1  -     fluticasone (FLONASE) 50 MCG/ACT nasal spray; 2 sprays into the nostril(s) as directed by provider Daily.  Dispense: 16 g; Refill: 11  -     lisinopril-hydrochlorothiazide (PRINZIDE,ZESTORETIC) 10-12.5 MG per tablet; Take 1 tablet by mouth Daily.  Dispense: 90 tablet; Refill: 1      Follow Up:  Return in 6 months (on 12/16/2021).

## 2021-07-01 VITALS
BODY MASS INDEX: 34.16 KG/M2 | HEIGHT: 63 IN | HEART RATE: 69 BPM | WEIGHT: 192.8 LBS | TEMPERATURE: 98.2 F | DIASTOLIC BLOOD PRESSURE: 66 MMHG | SYSTOLIC BLOOD PRESSURE: 120 MMHG

## 2021-07-01 VITALS
TEMPERATURE: 97.6 F | HEART RATE: 77 BPM | DIASTOLIC BLOOD PRESSURE: 87 MMHG | HEIGHT: 63 IN | SYSTOLIC BLOOD PRESSURE: 144 MMHG | BODY MASS INDEX: 33.77 KG/M2 | WEIGHT: 190.6 LBS

## 2021-07-01 VITALS
SYSTOLIC BLOOD PRESSURE: 124 MMHG | BODY MASS INDEX: 34.23 KG/M2 | TEMPERATURE: 97.8 F | HEART RATE: 67 BPM | DIASTOLIC BLOOD PRESSURE: 94 MMHG | WEIGHT: 193.2 LBS | HEIGHT: 63 IN

## 2021-07-01 VITALS
SYSTOLIC BLOOD PRESSURE: 132 MMHG | WEIGHT: 194 LBS | HEART RATE: 64 BPM | BODY MASS INDEX: 34.38 KG/M2 | TEMPERATURE: 98.6 F | DIASTOLIC BLOOD PRESSURE: 70 MMHG | HEIGHT: 63 IN

## 2021-07-02 VITALS
SYSTOLIC BLOOD PRESSURE: 139 MMHG | BODY MASS INDEX: 34.87 KG/M2 | TEMPERATURE: 97.8 F | DIASTOLIC BLOOD PRESSURE: 71 MMHG | WEIGHT: 196.8 LBS | HEART RATE: 69 BPM | HEIGHT: 63 IN

## 2021-07-02 VITALS — HEIGHT: 63 IN | TEMPERATURE: 98.3 F | RESPIRATION RATE: 16 BRPM | WEIGHT: 194 LBS | BODY MASS INDEX: 34.38 KG/M2

## 2021-07-02 VITALS
WEIGHT: 187 LBS | HEART RATE: 74 BPM | BODY MASS INDEX: 33.13 KG/M2 | HEIGHT: 63 IN | TEMPERATURE: 96.6 F | DIASTOLIC BLOOD PRESSURE: 78 MMHG | SYSTOLIC BLOOD PRESSURE: 136 MMHG

## 2021-07-15 ENCOUNTER — TELEPHONE (OUTPATIENT)
Dept: FAMILY MEDICINE CLINIC | Age: 71
End: 2021-07-15

## 2021-07-15 DIAGNOSIS — J30.1 ALLERGIC RHINITIS DUE TO POLLEN, UNSPECIFIED SEASONALITY: Primary | ICD-10-CM

## 2021-07-15 RX ORDER — FLUTICASONE PROPIONATE 50 MCG
2 SPRAY, SUSPENSION (ML) NASAL DAILY
Qty: 48 G | Refills: 3 | Status: SHIPPED | OUTPATIENT
Start: 2021-07-15 | End: 2022-01-13 | Stop reason: SDUPTHER

## 2021-07-15 RX ORDER — FLUTICASONE PROPIONATE 50 MCG
2 SPRAY, SUSPENSION (ML) NASAL DAILY
Qty: 16 G | Refills: 1 | Status: CANCELLED | OUTPATIENT
Start: 2021-07-15 | End: 2021-10-13

## 2021-07-15 NOTE — TELEPHONE ENCOUNTER
Caller: Antolin Simi Shawnkristen    Relationship: Self    Best call back number: [phone]    What medications are you currently taking:   Current Outpatient Medications on File Prior to Visit   Medication Sig Dispense Refill   • atorvastatin (LIPITOR) 10 MG tablet Take 1 tablet by mouth Daily. 90 tablet 1   • fexofenadine (ALLEGRA) 180 MG tablet Take 180 mg by mouth Daily.     • fluticasone (FLONASE) 50 MCG/ACT nasal spray 2 sprays into the nostril(s) as directed by provider Daily. 16 g 11   • ketotifen (ZADITOR) 0.025 % ophthalmic solution 1 drop 2 (Two) Times a Day.     • lisinopril-hydrochlorothiazide (PRINZIDE,ZESTORETIC) 10-12.5 MG per tablet Take 1 tablet by mouth Daily. 90 tablet 1     No current facility-administered medications on file prior to visit.        What are your concerns: FLUTICASONE (FLONASE) DOCTOR ONLY SENT A 30 DAY SUPPLY. PATIENT WOULD LIKE A 90 DAY SUPPLY.     [facility] [place], [place] - [address] AT [facility] - [phone]  - [phone] FX

## 2021-10-05 ENCOUNTER — CLINICAL SUPPORT (OUTPATIENT)
Dept: FAMILY MEDICINE CLINIC | Facility: CLINIC | Age: 71
End: 2021-10-05

## 2021-10-05 DIAGNOSIS — Z23 NEED FOR INFLUENZA VACCINATION: Primary | ICD-10-CM

## 2021-10-05 PROCEDURE — G0008 ADMIN INFLUENZA VIRUS VAC: HCPCS | Performed by: STUDENT IN AN ORGANIZED HEALTH CARE EDUCATION/TRAINING PROGRAM

## 2021-10-05 PROCEDURE — 90662 IIV NO PRSV INCREASED AG IM: CPT | Performed by: STUDENT IN AN ORGANIZED HEALTH CARE EDUCATION/TRAINING PROGRAM

## 2021-11-09 ENCOUNTER — TELEPHONE (OUTPATIENT)
Dept: FAMILY MEDICINE CLINIC | Age: 71
End: 2021-11-09

## 2021-11-09 DIAGNOSIS — I10 ESSENTIAL HYPERTENSION: Primary | ICD-10-CM

## 2021-11-09 DIAGNOSIS — E78.00 PURE HYPERCHOLESTEROLEMIA: ICD-10-CM

## 2021-12-02 RX ORDER — ATORVASTATIN CALCIUM 10 MG/1
TABLET, FILM COATED ORAL
Qty: 90 TABLET | Refills: 1 | Status: SHIPPED | OUTPATIENT
Start: 2021-12-02 | End: 2022-01-13 | Stop reason: SDUPTHER

## 2021-12-14 DIAGNOSIS — Z12.31 SCREENING MAMMOGRAM FOR BREAST CANCER: Primary | ICD-10-CM

## 2021-12-29 ENCOUNTER — LAB (OUTPATIENT)
Dept: LAB | Facility: HOSPITAL | Age: 71
End: 2021-12-29

## 2021-12-29 DIAGNOSIS — E78.00 PURE HYPERCHOLESTEROLEMIA: ICD-10-CM

## 2021-12-29 DIAGNOSIS — I10 ESSENTIAL HYPERTENSION: ICD-10-CM

## 2021-12-29 LAB
ALBUMIN SERPL-MCNC: 4.3 G/DL (ref 3.5–5.2)
ALBUMIN/GLOB SERPL: 1.8 G/DL
ALP SERPL-CCNC: 85 U/L (ref 39–117)
ALT SERPL W P-5'-P-CCNC: 10 U/L (ref 1–33)
ANION GAP SERPL CALCULATED.3IONS-SCNC: 9.6 MMOL/L (ref 5–15)
AST SERPL-CCNC: 16 U/L (ref 1–32)
BILIRUB SERPL-MCNC: 0.6 MG/DL (ref 0–1.2)
BUN SERPL-MCNC: 11 MG/DL (ref 8–23)
BUN/CREAT SERPL: 17.2 (ref 7–25)
CALCIUM SPEC-SCNC: 9.5 MG/DL (ref 8.6–10.5)
CHLORIDE SERPL-SCNC: 100 MMOL/L (ref 98–107)
CHOLEST SERPL-MCNC: 155 MG/DL (ref 0–200)
CO2 SERPL-SCNC: 29.4 MMOL/L (ref 22–29)
CREAT SERPL-MCNC: 0.64 MG/DL (ref 0.57–1)
GFR SERPL CREATININE-BSD FRML MDRD: 110 ML/MIN/1.73
GLOBULIN UR ELPH-MCNC: 2.4 GM/DL
GLUCOSE SERPL-MCNC: 95 MG/DL (ref 65–99)
HDLC SERPL-MCNC: 61 MG/DL (ref 40–60)
LDLC SERPL CALC-MCNC: 80 MG/DL (ref 0–100)
LDLC/HDLC SERPL: 1.3 {RATIO}
POTASSIUM SERPL-SCNC: 4 MMOL/L (ref 3.5–5.2)
PROT SERPL-MCNC: 6.7 G/DL (ref 6–8.5)
SODIUM SERPL-SCNC: 139 MMOL/L (ref 136–145)
TRIGL SERPL-MCNC: 75 MG/DL (ref 0–150)
VLDLC SERPL-MCNC: 14 MG/DL (ref 5–40)

## 2021-12-29 PROCEDURE — 80061 LIPID PANEL: CPT

## 2021-12-29 PROCEDURE — 36415 COLL VENOUS BLD VENIPUNCTURE: CPT

## 2021-12-29 PROCEDURE — 80053 COMPREHEN METABOLIC PANEL: CPT

## 2022-01-13 ENCOUNTER — OFFICE VISIT (OUTPATIENT)
Dept: FAMILY MEDICINE CLINIC | Age: 72
End: 2022-01-13

## 2022-01-13 VITALS
WEIGHT: 184.6 LBS | DIASTOLIC BLOOD PRESSURE: 66 MMHG | HEART RATE: 71 BPM | SYSTOLIC BLOOD PRESSURE: 138 MMHG | BODY MASS INDEX: 32.71 KG/M2 | TEMPERATURE: 97.8 F | HEIGHT: 63 IN

## 2022-01-13 DIAGNOSIS — M25.541 ARTHRALGIA OF BOTH HANDS: ICD-10-CM

## 2022-01-13 DIAGNOSIS — E78.00 HYPERCHOLESTEREMIA: ICD-10-CM

## 2022-01-13 DIAGNOSIS — J30.1 ALLERGIC RHINITIS DUE TO POLLEN, UNSPECIFIED SEASONALITY: ICD-10-CM

## 2022-01-13 DIAGNOSIS — I10 PRIMARY HYPERTENSION: Primary | ICD-10-CM

## 2022-01-13 DIAGNOSIS — M25.542 ARTHRALGIA OF BOTH HANDS: ICD-10-CM

## 2022-01-13 PROCEDURE — 99214 OFFICE O/P EST MOD 30 MIN: CPT | Performed by: FAMILY MEDICINE

## 2022-01-13 RX ORDER — LISINOPRIL AND HYDROCHLOROTHIAZIDE 12.5; 1 MG/1; MG/1
1 TABLET ORAL DAILY
Qty: 90 TABLET | Refills: 1 | Status: SHIPPED | OUTPATIENT
Start: 2022-01-13 | End: 2022-07-12 | Stop reason: SDUPTHER

## 2022-01-13 RX ORDER — FLUTICASONE PROPIONATE 50 MCG
2 SPRAY, SUSPENSION (ML) NASAL DAILY
Qty: 48 G | Refills: 3 | Status: SHIPPED | OUTPATIENT
Start: 2022-01-13 | End: 2023-01-12 | Stop reason: SDUPTHER

## 2022-01-13 RX ORDER — ATORVASTATIN CALCIUM 10 MG/1
10 TABLET, FILM COATED ORAL DAILY
Qty: 90 TABLET | Refills: 1 | Status: SHIPPED | OUTPATIENT
Start: 2022-01-13 | End: 2022-07-12 | Stop reason: SDUPTHER

## 2022-01-13 NOTE — PROGRESS NOTES
Simi Dangelo presents to [facility].    Chief Complaint: routine follow up for BP and cholesterol    Subjective       History of Present Illness:  HPI  Dx with pure hypercholesterolemia, unspecified; current treatment includes Lipitor and a low cholesterol/low fat diet.  Compliance with treatment has been good; she maintains her low cholesterol diet.  She denies experiencing any hypercholesterolemia related symptoms.     LABS REVIEWED AND WELL CONTROLLED      Concerning essential (primary) hypertension, this was first diagnosed more than 5 years ago.  She is not using any nonpharmacologic treatment modalities.  Her current cardiac medication regimen includes a diuretic (hctz) and an ACE (lisinopril) inhibitor.  Compliance with treatment has been good; she takes her medication as directed, maintains her diet and exercise regimen, and follows up as directed.  She is tolerating the medication well without side effects.  Her home wrist BP cuff shows good BP         chronic allergies and she is overall doing well on allegra 180 mg and flonase    She is having finger pain and wanting an anti inflammatory for her pain, pain usually is worse at night with swelling of fingers.     Review of Systems:  Review of Systems   Constitutional: Negative for chills, fatigue and fever.   HENT: Negative for ear pain, sinus pressure and sore throat.    Eyes: Negative for blurred vision and double vision.   Respiratory: Negative for cough, shortness of breath and wheezing.    Cardiovascular: Negative for chest pain and palpitations.   Gastrointestinal: Negative for abdominal pain, blood in stool, constipation, diarrhea, nausea and vomiting.   Skin: Negative for rash.   Neurological: Negative for dizziness and headache.   Psychiatric/Behavioral: Negative for depressed mood.        Objective   Medical History:  Past Medical History:   • Allergy, unspecified, subsequent encounter   • Bilateral primary  osteoarthritis of knee   • Decreased white blood cell count, unspecified   • Essential (primary) hypertension   • Full incontinence of feces   • Hemorrhoids   • History of total bilateral knee replacement (TKR)   • Hypercholesteremia   • Hypertension   • Morbid (severe) obesity due to excess calories (HCC)   • Overweight   • Pain involving joints of fingers of both hands   • Presence of unspecified artificial knee joint   • Pure hypercholesterolemia   • Residual hemorrhoidal skin tag   • Varicose veins of bilateral lower extremities with pain     Past Surgical History:   • APPENDECTOMY   • HYSTERECTOMY    with left oophrectomy   • TONSILLECTOMY AND ADENOIDECTOMY   • TUMOR REMOVAL    Left thumb      Family History   Problem Relation Age of Onset   • Pulmonary embolism Brother      Social History     Tobacco Use   • Smoking status: Never Smoker   • Smokeless tobacco: Never Used   Substance Use Topics   • Alcohol use: Never       Health Maintenance Due   Topic Date Due   • ZOSTER VACCINE (2 of 3) 08/06/2013   • HEPATITIS C SCREENING  Never done   • TDAP/TD VACCINES (3 - Tdap) 09/15/2021   • DXA SCAN  11/11/2021        Immunization History   Administered Date(s) Administered   • COVID-19 (PFIZER) 02/13/2021, 03/09/2021, 11/08/2021   • Fluzone High Dose =>65 Years ([employer] ONLY) 10/04/2017, 10/27/2020   • Fluzone High-Dose 65+yrs 10/05/2021   • Hepatitis A 06/14/2018   • Pneumococcal Conjugate 13-Valent (PCV13) 01/11/2016   • Pneumococcal Polysaccharide (PPSV23) 12/06/2013   • Td 02/02/2008, 09/15/2011   • Zostavax 06/11/2013       No Known Allergies     Medications:  Current Outpatient Medications on File Prior to Visit   Medication Sig   • fexofenadine (ALLEGRA) 180 MG tablet Take 180 mg by mouth Daily.   • ketotifen (ZADITOR) 0.025 % ophthalmic solution 1 drop 2 (Two) Times a Day.   • [DISCONTINUED] atorvastatin (LIPITOR) 10 MG tablet TAKE 1 TABLET DAILY   • [DISCONTINUED] fluticasone (FLONASE) 50 MCG/ACT nasal spray  "2 sprays into the nostril(s) as directed by provider Daily.   • [DISCONTINUED] lisinopril-hydrochlorothiazide (PRINZIDE,ZESTORETIC) 10-12.5 MG per tablet Take 1 tablet by mouth Daily.     No current facility-administered medications on file prior to visit.       Vital Signs:   /66   Pulse 71   Temp 97.8 °F (36.6 °C) (Oral)   Ht 160 cm (63\")   Wt 83.7 kg (184 lb 9.6 oz)   BMI 32.70 kg/m²       Physical Exam:  Physical Exam  Vitals and nursing note reviewed.   Constitutional:       General: She is not in acute distress.     Appearance: Normal appearance. She is not ill-appearing, toxic-appearing or diaphoretic.   HENT:      Head: Normocephalic and atraumatic.      Right Ear: Tympanic membrane, ear canal and external ear normal.      Left Ear: Tympanic membrane, ear canal and external ear normal.      Nose: No congestion or rhinorrhea.      Mouth/Throat:      Mouth: Mucous membranes are moist.      Pharynx: Oropharynx is clear. No oropharyngeal exudate or posterior oropharyngeal erythema.   Eyes:      Extraocular Movements: Extraocular movements intact.      Conjunctiva/sclera: Conjunctivae normal.   Cardiovascular:      Rate and Rhythm: Normal rate and regular rhythm.      Heart sounds: Normal heart sounds.   Pulmonary:      Effort: Pulmonary effort is normal.      Breath sounds: Normal breath sounds. No wheezing, rhonchi or rales.   Abdominal:      General: Abdomen is flat. Bowel sounds are normal.      Palpations: Abdomen is soft.   Musculoskeletal:      Cervical back: Neck supple. No rigidity.   Lymphadenopathy:      Cervical: No cervical adenopathy.   Skin:     General: Skin is warm and dry.   Neurological:      Mental Status: She is alert and oriented to person, place, and time.   Psychiatric:         Mood and Affect: Mood normal.         Behavior: Behavior normal.         Result Review      The following data was reviewed by Shanika Crawford MD on 01/13/2022.  Lab Results   Component Value Date    " WBC 4.42 06/16/2021    HGB 12.4 06/16/2021    HCT 38.7 06/16/2021    MCV 84.7 06/16/2021     06/16/2021     Lab Results   Component Value Date    GLUCOSE 95 12/29/2021    BUN 11 12/29/2021    CREATININE 0.64 12/29/2021    EGFRIFAFRI 110 12/29/2021    BCR 17.2 12/29/2021    K 4.0 12/29/2021    CO2 29.4 (H) 12/29/2021    CALCIUM 9.5 12/29/2021    ALBUMIN 4.30 12/29/2021    LABIL2 1.4 12/14/2020    AST 16 12/29/2021    ALT 10 12/29/2021     Lab Results   Component Value Date    CHOL 155 12/29/2021    CHLPL 178 12/14/2020    TRIG 75 12/29/2021    HDL 61 (H) 12/29/2021    LDL 80 12/29/2021     No results found for: TSH  No results found for: HGBA1C  No results found for: PSA                    Assessment and Plan:          Diagnoses and all orders for this visit:    1. Primary hypertension (Primary)  Comments:  Stable on lisinopril HCTZ.  Labs reviewed and are good.  She tolerates meds well    2. Allergic rhinitis due to pollen, unspecified seasonality  Comments:  Stable on Allegra and Flonase nasal spray.  Meds refilled  Orders:  -     fluticasone (FLONASE) 50 MCG/ACT nasal spray; 2 sprays into the nostril(s) as directed by provider Daily.  Dispense: 48 g; Refill: 3    3. Hypercholesteremia  Comments:  Stable on Lipitor.  Labs reviewed and her cholesterol is well controlled    4. Arthralgia of both hands    Other orders  -     Diclofenac Sodium (VOLTAREN) 1 % gel gel; Apply 4 g topically to the appropriate area as directed 4 (Four) Times a Day As Needed (knee arthritis) for up to 30 days.  Dispense: 100 g; Refill: 2  -     lisinopril-hydrochlorothiazide (PRINZIDE,ZESTORETIC) 10-12.5 MG per tablet; Take 1 tablet by mouth Daily.  Dispense: 90 tablet; Refill: 1  -     atorvastatin (LIPITOR) 10 MG tablet; Take 1 tablet by mouth Daily.  Dispense: 90 tablet; Refill: 1          Follow Up   No follow-ups on file.

## 2022-02-04 ENCOUNTER — APPOINTMENT (OUTPATIENT)
Dept: MAMMOGRAPHY | Facility: HOSPITAL | Age: 72
End: 2022-02-04

## 2022-03-18 ENCOUNTER — HOSPITAL ENCOUNTER (OUTPATIENT)
Dept: MAMMOGRAPHY | Facility: HOSPITAL | Age: 72
Discharge: HOME | End: 2022-03-18
Admitting: FAMILY MEDICINE

## 2022-03-18 DIAGNOSIS — Z12.31 SCREENING MAMMOGRAM FOR BREAST CANCER: ICD-10-CM

## 2022-03-18 PROCEDURE — 77067 SCR MAMMO BI INCL CAD: CPT

## 2022-03-18 PROCEDURE — 77063 BREAST TOMOSYNTHESIS BI: CPT

## 2022-07-12 ENCOUNTER — LAB (OUTPATIENT)
Dept: LAB | Facility: HOSPITAL | Age: 72
End: 2022-07-12

## 2022-07-12 ENCOUNTER — OFFICE VISIT (OUTPATIENT)
Dept: FAMILY MEDICINE CLINIC | Age: 72
End: 2022-07-12

## 2022-07-12 VITALS
BODY MASS INDEX: 32.96 KG/M2 | HEIGHT: 63 IN | SYSTOLIC BLOOD PRESSURE: 123 MMHG | OXYGEN SATURATION: 95 % | DIASTOLIC BLOOD PRESSURE: 75 MMHG | HEART RATE: 63 BPM | WEIGHT: 186 LBS

## 2022-07-12 DIAGNOSIS — H61.23 BILATERAL IMPACTED CERUMEN: ICD-10-CM

## 2022-07-12 DIAGNOSIS — G56.01 CARPAL TUNNEL SYNDROME OF RIGHT WRIST: ICD-10-CM

## 2022-07-12 DIAGNOSIS — Z00.00 ENCOUNTER FOR ANNUAL WELLNESS EXAM IN MEDICARE PATIENT: Primary | ICD-10-CM

## 2022-07-12 DIAGNOSIS — I10 PRIMARY HYPERTENSION: ICD-10-CM

## 2022-07-12 DIAGNOSIS — Z12.31 ENCOUNTER FOR SCREENING MAMMOGRAM FOR BREAST CANCER: ICD-10-CM

## 2022-07-12 DIAGNOSIS — Z78.0 POSTMENOPAUSAL STATE: ICD-10-CM

## 2022-07-12 DIAGNOSIS — Z11.59 ENCOUNTER FOR SCREENING FOR OTHER VIRAL DISEASES: ICD-10-CM

## 2022-07-12 DIAGNOSIS — Z12.11 COLON CANCER SCREENING: ICD-10-CM

## 2022-07-12 DIAGNOSIS — Z23 ENCOUNTER FOR IMMUNIZATION: ICD-10-CM

## 2022-07-12 DIAGNOSIS — E78.00 HYPERCHOLESTEREMIA: ICD-10-CM

## 2022-07-12 DIAGNOSIS — J30.1 ALLERGIC RHINITIS DUE TO POLLEN, UNSPECIFIED SEASONALITY: ICD-10-CM

## 2022-07-12 LAB
ALBUMIN SERPL-MCNC: 4.4 G/DL (ref 3.5–5.2)
ALBUMIN/GLOB SERPL: 1.4 G/DL
ALP SERPL-CCNC: 97 U/L (ref 39–117)
ALT SERPL W P-5'-P-CCNC: 20 U/L (ref 1–33)
ANION GAP SERPL CALCULATED.3IONS-SCNC: 10.1 MMOL/L (ref 5–15)
AST SERPL-CCNC: 18 U/L (ref 1–32)
BILIRUB SERPL-MCNC: 0.8 MG/DL (ref 0–1.2)
BUN SERPL-MCNC: 13 MG/DL (ref 8–23)
BUN/CREAT SERPL: 18.8 (ref 7–25)
CALCIUM SPEC-SCNC: 9.6 MG/DL (ref 8.6–10.5)
CHLORIDE SERPL-SCNC: 100 MMOL/L (ref 98–107)
CHOLEST SERPL-MCNC: 181 MG/DL (ref 0–200)
CO2 SERPL-SCNC: 28.9 MMOL/L (ref 22–29)
CREAT SERPL-MCNC: 0.69 MG/DL (ref 0.57–1)
DEPRECATED RDW RBC AUTO: 46.8 FL (ref 37–54)
EGFRCR SERPLBLD CKD-EPI 2021: 91.2 ML/MIN/1.73
ERYTHROCYTE [DISTWIDTH] IN BLOOD BY AUTOMATED COUNT: 14.6 % (ref 12.3–15.4)
GLOBULIN UR ELPH-MCNC: 3.2 GM/DL
GLUCOSE SERPL-MCNC: 96 MG/DL (ref 65–99)
HCT VFR BLD AUTO: 40.8 % (ref 34–46.6)
HDLC SERPL-MCNC: 67 MG/DL (ref 40–60)
HGB BLD-MCNC: 12.6 G/DL (ref 12–15.9)
LDLC SERPL CALC-MCNC: 99 MG/DL (ref 0–100)
LDLC/HDLC SERPL: 1.45 {RATIO}
MCH RBC QN AUTO: 26.5 PG (ref 26.6–33)
MCHC RBC AUTO-ENTMCNC: 30.9 G/DL (ref 31.5–35.7)
MCV RBC AUTO: 85.9 FL (ref 79–97)
PLATELET # BLD AUTO: 248 10*3/MM3 (ref 140–450)
PMV BLD AUTO: 9.6 FL (ref 6–12)
POTASSIUM SERPL-SCNC: 4.3 MMOL/L (ref 3.5–5.2)
PROT SERPL-MCNC: 7.6 G/DL (ref 6–8.5)
RBC # BLD AUTO: 4.75 10*6/MM3 (ref 3.77–5.28)
SODIUM SERPL-SCNC: 139 MMOL/L (ref 136–145)
TRIGL SERPL-MCNC: 84 MG/DL (ref 0–150)
VLDLC SERPL-MCNC: 15 MG/DL (ref 5–40)
WBC NRBC COR # BLD: 4.6 10*3/MM3 (ref 3.4–10.8)

## 2022-07-12 PROCEDURE — 80061 LIPID PANEL: CPT

## 2022-07-12 PROCEDURE — 80053 COMPREHEN METABOLIC PANEL: CPT

## 2022-07-12 PROCEDURE — 85027 COMPLETE CBC AUTOMATED: CPT

## 2022-07-12 PROCEDURE — G0439 PPPS, SUBSEQ VISIT: HCPCS | Performed by: FAMILY MEDICINE

## 2022-07-12 PROCEDURE — 91305 COVID-19 (PFIZER) 12+ YRS: CPT | Performed by: FAMILY MEDICINE

## 2022-07-12 PROCEDURE — 0054A COVID-19 (PFIZER) 12+ YRS: CPT | Performed by: FAMILY MEDICINE

## 2022-07-12 PROCEDURE — 1159F MED LIST DOCD IN RCRD: CPT | Performed by: FAMILY MEDICINE

## 2022-07-12 PROCEDURE — 1170F FXNL STATUS ASSESSED: CPT | Performed by: FAMILY MEDICINE

## 2022-07-12 PROCEDURE — 99214 OFFICE O/P EST MOD 30 MIN: CPT | Performed by: FAMILY MEDICINE

## 2022-07-12 PROCEDURE — 36415 COLL VENOUS BLD VENIPUNCTURE: CPT

## 2022-07-12 RX ORDER — ATORVASTATIN CALCIUM 10 MG/1
10 TABLET, FILM COATED ORAL DAILY
Qty: 90 TABLET | Refills: 1 | Status: SHIPPED | OUTPATIENT
Start: 2022-07-12 | End: 2023-01-12 | Stop reason: SDUPTHER

## 2022-07-12 RX ORDER — LISINOPRIL AND HYDROCHLOROTHIAZIDE 12.5; 1 MG/1; MG/1
1 TABLET ORAL DAILY
Qty: 90 TABLET | Refills: 1 | Status: SHIPPED | OUTPATIENT
Start: 2022-07-12 | End: 2023-01-12 | Stop reason: SDUPTHER

## 2022-07-12 RX ORDER — FEXOFENADINE HCL 180 MG/1
180 TABLET ORAL DAILY
Qty: 90 TABLET | Refills: 1 | Status: SHIPPED | OUTPATIENT
Start: 2022-07-12

## 2022-07-12 NOTE — ASSESSMENT & PLAN NOTE
MEDICARE WELLNESS EXAM-SHE IS UTD ON COLONOSCOPY/MAMMOGRAM DUE FOR DEXA, DONE WITH PELVIC EXAMS DUE TO AGE, UTD ON FLU/SHINGLES/PNEUMOVAX/PREVNAR ,RECOMMEND SHINGRIX/TDAP AND YEARLY FLU VACCINATION,  NO FALL RISK, NO MEMORY ISSUES OR DEPRESSION, SHE LIVES ALONE,  SHE IS ABLE TO DRIVE AND PERFORM ADL'S/FINANCES, HEARING IS ADEQUATE, SHE HAS A HA ON  A LIVING WILL.  RECOMMEND YEARLY EYE EXAMS/DENTAL, MD LIST UPDATED, RECOMMEND DIETARY REFERRAL FOR WEIGHT LOSS

## 2022-07-12 NOTE — PROGRESS NOTES
1. Tingling in fingers 2-4, sometimes 1, sometimes joints, distally. First noticed 7-8 months ago  - Worked in factories for 45 years  - Right handed  - Bothers her most when she's on her computer (usually in recliner) or at night when she's laying down. Sleeps on her arm and hand. Notices that it improves when she readjusts  - Tendonitis in Left shoulder, doesn't usually bother her    2. Currently taking allergy meds as needed; flonase once per day, twice when pollen is bad    3. Vaccinations: COVID booster, Tetanus, DEXA scan, Shingrix  (curious about cost);

## 2022-07-12 NOTE — PROGRESS NOTES
The ABCs of the Annual Wellness Visit  Subsequent Medicare Wellness Visit    Chief Complaint   Patient presents with   • Medicare Wellness-subsequent      Subjective    History of Present Illness:  Simi Dangelo is a 72 y.o. female who presents for a Subsequent Medicare Wellness Visit.    The following portions of the patient's history were reviewed and   updated as appropriate: allergies, current medications, past family history, past medical history, past social history, past surgical history and problem list.    Compared to one year ago, the patient feels her physical   health is better.    Compared to one year ago, the patient feels her mental   health is better.    Recent Hospitalizations:  She was not admitted to the hospital during the last year.   Dx with pure hypercholesterolemia, unspecified; current treatment includes Lipitor and a low cholesterol/low fat diet.  Compliance with treatment has been good; she maintains her low cholesterol diet.  She denies experiencing any hypercholesterolemia related symptoms.             Concerning essential (primary) hypertension, this was first diagnosed more than 5 years ago.  She is not using any nonpharmacologic treatment modalities.  Her current cardiac medication regimen includes a diuretic and an ACE inhibitor.  Compliance with treatment has been good; she takes her medication as directed, maintains her diet and exercise regimen, and follows up as directed.  She is tolerating the medication well without side effects.  Ms. Dangelo does not check her blood pressure other than at her clinic appointments.          chronic anxiety, she is overall doing well on allegra 180 mg and her BP and allergies are well controlled on meds    1. Tingling in fingers 2-4, sometimes 1, sometimes joints, distally. First noticed 7-8 months ago  - Worked in factories for 45 years  - Right handed  - Bothers her most when she's on her computer (usually in recliner) or at night when she's laying  down. Sleeps on her arm and hand. Notices that it improves when she readjusts  - Tendonitis in Left shoulder, doesn't usually bother her     2. Currently taking allergy meds as needed; flonase once per day, twice when pollen is bad     3. Vaccinations: COVID booster, Tetanus, DEXA scan, Shingrix  (curious about cost);     Current Medical Providers:  Patient Care Team:  Shanika Crawford MD as PCP - General (Family Medicine)  Walt Jama OD (Optometry)  Luis Jay DO (Orthopedic Surgery)    Outpatient Medications Prior to Visit   Medication Sig Dispense Refill   • fluticasone (FLONASE) 50 MCG/ACT nasal spray 2 sprays into the nostril(s) as directed by provider Daily. 48 g 3   • ketotifen (ZADITOR) 0.025 % ophthalmic solution 1 drop 2 (Two) Times a Day.     • atorvastatin (LIPITOR) 10 MG tablet Take 1 tablet by mouth Daily. 90 tablet 1   • fexofenadine (ALLEGRA) 180 MG tablet Take 180 mg by mouth Daily.     • lisinopril-hydrochlorothiazide (PRINZIDE,ZESTORETIC) 10-12.5 MG per tablet Take 1 tablet by mouth Daily. 90 tablet 1     No facility-administered medications prior to visit.       No opioid medication identified on active medication list. I have reviewed chart for other potential  high risk medication/s and harmful drug interactions in the elderly.          Aspirin is not on active medication list.  Aspirin use is not indicated based on review of current medical condition/s. Risk of harm outweighs potential benefits.  .    Patient Active Problem List   Diagnosis   • Hypertension   • Hypercholesteremia   • Encounter for annual wellness exam in Medicare patient     Advance Care Planning  Advance Directive is not on file.  ACP discussion was held with the patient during this visit. Patient does not have an advance directive, information provided.    Review of Systems   Constitutional: Negative for chills, fatigue and fever.   HENT: Negative for ear pain, sinus pressure and sore throat.    Respiratory:  "Negative for cough, shortness of breath and wheezing.    Cardiovascular: Negative for chest pain, palpitations and leg swelling.   Gastrointestinal: Negative for abdominal pain, blood in stool, constipation, diarrhea, nausea and vomiting.   Genitourinary: Negative for dysuria.   Skin: Negative for rash.   Neurological: Negative for dizziness.   Psychiatric/Behavioral: Negative for sleep disturbance and suicidal ideas.        Objective    Vitals:    07/12/22 1108   BP: 123/75   BP Location: Left arm   Patient Position: Sitting   Cuff Size: Adult   Pulse: 63   SpO2: 95%  Comment: Room air   Weight: 84.4 kg (186 lb)   Height: 160 cm (63\")     BMI Readings from Last 1 Encounters:   07/12/22 32.95 kg/m²   BMI is above normal parameters. Recommendations include: exercise counseling, nutrition counseling and referral to a nutritionist    Does the patient have evidence of cognitive impairment? No    Physical Exam  Vitals and nursing note reviewed.   Constitutional:       General: She is not in acute distress.     Appearance: Normal appearance. She is not ill-appearing, toxic-appearing or diaphoretic.   HENT:      Head: Normocephalic and atraumatic.      Right Ear: Tympanic membrane, ear canal and external ear normal.      Left Ear: Tympanic membrane, ear canal and external ear normal.      Nose: No congestion or rhinorrhea.      Mouth/Throat:      Mouth: Mucous membranes are moist.      Pharynx: Oropharynx is clear. No oropharyngeal exudate or posterior oropharyngeal erythema.   Eyes:      Extraocular Movements: Extraocular movements intact.      Conjunctiva/sclera: Conjunctivae normal.      Pupils: Pupils are equal, round, and reactive to light.   Cardiovascular:      Rate and Rhythm: Normal rate and regular rhythm.      Heart sounds: Normal heart sounds.   Pulmonary:      Effort: Pulmonary effort is normal.      Breath sounds: Normal breath sounds. No wheezing, rhonchi or rales.   Abdominal:      General: Abdomen is flat. "      Palpations: Abdomen is soft.   Musculoskeletal:      Cervical back: Neck supple. No rigidity.   Lymphadenopathy:      Cervical: No cervical adenopathy.   Skin:     General: Skin is warm and dry.   Neurological:      Mental Status: She is alert and oriented to person, place, and time.   Psychiatric:         Mood and Affect: Mood normal.         Behavior: Behavior normal.                 HEALTH RISK ASSESSMENT    Smoking Status:  Social History     Tobacco Use   Smoking Status Never Smoker   Smokeless Tobacco Never Used     Alcohol Consumption:  Social History     Substance and Sexual Activity   Alcohol Use Never     Fall Risk Screen:    [facility], and patient is at LOW risk for falls.Assessment completed on:7/12/2022    Depression Screening:  PHQ-2/PHQ-9 Depression Screening 7/12/2022   Retired PHQ-9 Total Score -   Retired Total Score -   Little Interest or Pleasure in Doing Things 0-->not at all   Feeling Down, Depressed or Hopeless 0-->not at all   PHQ-9: Brief Depression Severity Measure Score 0       Health Habits and Functional and Cognitive Screening:  Functional & Cognitive Status 7/12/2022   Do you have difficulty preparing food and eating? No   Do you have difficulty bathing yourself, getting dressed or grooming yourself? No   Do you have difficulty using the toilet? No   Do you have difficulty moving around from place to place? No   Do you have trouble with steps or getting out of a bed or a chair? No   Current Diet Unhealthy Diet   Dental Exam Up to date   Eye Exam Up to date   Exercise (times per week) 5 times per week   Current Exercises Include Walking   Current Exercise Activities Include -   Do you need help using the phone?  No   Are you deaf or do you have serious difficulty hearing?  No   Do you need help with transportation? No   Do you need help shopping? No   Do you need help preparing meals?  No   Do you need help with housework?  No   Do you need help with  laundry? No   Do you need help taking your medications? No   Do you need help managing money? No   Do you ever drive or ride in a car without wearing a seat belt? Yes   Have you felt unusual stress, anger or loneliness in the last month? No   Who do you live with? Alone   If you need help, do you have trouble finding someone available to you? No   Have you been bothered in the last four weeks by sexual problems? No   Do you have difficulty concentrating, remembering or making decisions? No       Age-appropriate Screening Schedule:  Refer to the list below for future screening recommendations based on patient's age, sex and/or medical conditions. Orders for these recommended tests are listed in the plan section. The patient has been provided with a written plan.    Health Maintenance   Topic Date Due   • ZOSTER VACCINE (2 of 3) 08/06/2013   • TDAP/TD VACCINES (3 - Tdap) 09/15/2021   • DXA SCAN  11/11/2021   • INFLUENZA VACCINE  10/01/2022   • LIPID PANEL  12/29/2022   • MAMMOGRAM  03/18/2024                The following data was reviewed by: Shanika Crawford MD on 07/12/2022:  CMP    CMP 12/29/21   Glucose 95   BUN 11   Creatinine 0.64   eGFR African Am 110   Sodium 139   Potassium 4.0   Chloride 100   Calcium 9.5   Albumin 4.30   Total Bilirubin 0.6   Alkaline Phosphatase 85   AST (SGOT) 16   ALT (SGPT) 10           CBC    CBC 7/12/22   WBC 4.60   RBC 4.75   Hemoglobin 12.6   Hematocrit 40.8   MCV 85.9   MCH 26.5 (A)   MCHC 30.9 (A)   RDW 14.6   Platelets 248   (A) Abnormal value            Lipid Panel    Lipid Panel 12/29/21   Total Cholesterol 155   Triglycerides 75   HDL Cholesterol 61 (A)   VLDL Cholesterol 14   LDL Cholesterol  80   LDL/HDL Ratio 1.30   (A) Abnormal value                HgB    HGB 7/12/22   Hemoglobin 12.6                        Assessment & Plan   CMS Preventative Services Quick Reference  Risk Factors Identified During Encounter  Immunizations Discussed/Encouraged (specific Immunizations;  Tdap, Influenza, Shingrix and COVID19  Inactivity/Sedentary  Obesity/Overweight   The above risks/problems have been discussed with the patient.  Follow up actions/plans if indicated are seen below in the Assessment/Plan Section.  Pertinent information has been shared with the patient in the After Visit Summary.    Diagnoses and all orders for this visit:    1. Encounter for annual wellness exam in Medicare patient (Primary)  Assessment & Plan:  MEDICARE WELLNESS EXAM-SHE IS UTD ON COLONOSCOPY/MAMMOGRAM DUE FOR DEXA, DONE WITH PELVIC EXAMS DUE TO AGE, UTD ON FLU/SHINGLES/PNEUMOVAX/PREVNAR ,RECOMMEND SHINGRIX/TDAP AND YEARLY FLU VACCINATION,  NO FALL RISK, NO MEMORY ISSUES OR DEPRESSION, SHE LIVES ALONE,  SHE IS ABLE TO DRIVE AND PERFORM ADL'S/FINANCES, HEARING IS ADEQUATE, SHE HAS A HA ON  A LIVING WILL.  RECOMMEND YEARLY EYE EXAMS/DENTAL, MD LIST UPDATED, RECOMMEND DIETARY REFERRAL FOR WEIGHT LOSS       2. Encounter for screening mammogram for breast cancer    3. Postmenopausal state  -     DEXA Bone Density Axial; Future    4. Colon cancer screening    5. Encounter for immunization  -     COVID-19 Vaccine (Pfizer) Gray Cap    6. Primary hypertension  Comments:  Very well controlled on current medications.  We will continue current treatment plan  Orders:  -     lisinopril-hydrochlorothiazide (PRINZIDE,ZESTORETIC) 10-12.5 MG per tablet; Take 1 tablet by mouth Daily.  Dispense: 90 tablet; Refill: 1  -     Comprehensive Metabolic Panel; Future  -     CBC (No Diff); Future    7. Hypercholesteremia  Comments:  Stable on atorvastatin.  Tolerating medication well.  Previous labs reviewed and new labs ordered  Orders:  -     atorvastatin (LIPITOR) 10 MG tablet; Take 1 tablet by mouth Daily.  Dispense: 90 tablet; Refill: 1  -     Lipid Panel; Future    8. Allergic rhinitis due to pollen, unspecified seasonality  Comments:  Stable on Flonase and Allegra  Orders:  -     fexofenadine (ALLEGRA) 180 MG tablet; Take 1 tablet by  mouth Daily.  Dispense: 90 tablet; Refill: 1    9. Encounter for screening for other viral diseases  -     Cancel: Hepatitis C antibody; Future    10. Carpal tunnel syndrome of right wrist  Comments:  recommend wrist splint when she sleeps at night    11. Bilateral impacted cerumen  -     Ear Cerumen Removal      Follow Up:   Return in about 6 months (around 1/12/2023) for Recheck.     An After Visit Summary and PPPS were made available to the patient.

## 2022-07-18 ENCOUNTER — HOSPITAL ENCOUNTER (OUTPATIENT)
Dept: BONE DENSITY | Facility: HOSPITAL | Age: 72
Discharge: HOME | End: 2022-07-18
Admitting: FAMILY MEDICINE

## 2022-07-18 DIAGNOSIS — Z78.0 POSTMENOPAUSAL STATE: ICD-10-CM

## 2022-07-18 PROCEDURE — 77080 DXA BONE DENSITY AXIAL: CPT

## 2022-10-24 ENCOUNTER — CLINICAL SUPPORT (OUTPATIENT)
Dept: FAMILY MEDICINE CLINIC | Age: 72
End: 2022-10-24

## 2022-10-24 DIAGNOSIS — Z23 NEED FOR INFLUENZA VACCINATION: Primary | ICD-10-CM

## 2022-10-24 PROCEDURE — G0008 ADMIN INFLUENZA VIRUS VAC: HCPCS | Performed by: FAMILY MEDICINE

## 2022-10-24 PROCEDURE — 90662 IIV NO PRSV INCREASED AG IM: CPT | Performed by: FAMILY MEDICINE

## 2022-11-07 ENCOUNTER — IMMUNIZATION (OUTPATIENT)
Dept: FAMILY MEDICINE CLINIC | Age: 72
End: 2022-11-07

## 2022-11-07 DIAGNOSIS — Z23 IMMUNIZATION DUE: Primary | ICD-10-CM

## 2022-11-07 PROCEDURE — 91312 COVID-19 (PFIZER) BIVALENT BOOSTER 12+YRS: CPT | Performed by: FAMILY MEDICINE

## 2022-11-07 PROCEDURE — 0124A COVID-19 (PFIZER) BIVALENT BOOSTER 12+YRS: CPT | Performed by: FAMILY MEDICINE

## 2023-01-12 ENCOUNTER — OFFICE VISIT (OUTPATIENT)
Dept: FAMILY MEDICINE CLINIC | Age: 73
End: 2023-01-12
Payer: MEDICARE

## 2023-01-12 ENCOUNTER — LAB (OUTPATIENT)
Dept: LAB | Facility: HOSPITAL | Age: 73
End: 2023-01-12
Payer: MEDICARE

## 2023-01-12 VITALS
OXYGEN SATURATION: 98 % | HEART RATE: 68 BPM | DIASTOLIC BLOOD PRESSURE: 77 MMHG | SYSTOLIC BLOOD PRESSURE: 126 MMHG | HEIGHT: 63 IN | TEMPERATURE: 98.4 F | WEIGHT: 186.6 LBS | BODY MASS INDEX: 33.06 KG/M2

## 2023-01-12 DIAGNOSIS — I10 PRIMARY HYPERTENSION: ICD-10-CM

## 2023-01-12 DIAGNOSIS — M19.041 OSTEOARTHRITIS OF BOTH HANDS, UNSPECIFIED OSTEOARTHRITIS TYPE: ICD-10-CM

## 2023-01-12 DIAGNOSIS — L72.0 INCLUSION CYST: Primary | ICD-10-CM

## 2023-01-12 DIAGNOSIS — J30.1 ALLERGIC RHINITIS DUE TO POLLEN, UNSPECIFIED SEASONALITY: ICD-10-CM

## 2023-01-12 DIAGNOSIS — E78.00 HYPERCHOLESTEREMIA: ICD-10-CM

## 2023-01-12 DIAGNOSIS — M19.042 OSTEOARTHRITIS OF BOTH HANDS, UNSPECIFIED OSTEOARTHRITIS TYPE: ICD-10-CM

## 2023-01-12 DIAGNOSIS — M77.9 TENDONITIS: ICD-10-CM

## 2023-01-12 PROCEDURE — 36415 COLL VENOUS BLD VENIPUNCTURE: CPT

## 2023-01-12 PROCEDURE — 99214 OFFICE O/P EST MOD 30 MIN: CPT | Performed by: FAMILY MEDICINE

## 2023-01-12 PROCEDURE — 80053 COMPREHEN METABOLIC PANEL: CPT

## 2023-01-12 PROCEDURE — 80061 LIPID PANEL: CPT

## 2023-01-12 RX ORDER — FLUTICASONE PROPIONATE 50 MCG
2 SPRAY, SUSPENSION (ML) NASAL DAILY
Qty: 48 G | Refills: 3 | Status: SHIPPED | OUTPATIENT
Start: 2023-01-12

## 2023-01-12 RX ORDER — ATORVASTATIN CALCIUM 10 MG/1
10 TABLET, FILM COATED ORAL DAILY
Qty: 90 TABLET | Refills: 1 | Status: SHIPPED | OUTPATIENT
Start: 2023-01-12

## 2023-01-12 RX ORDER — LISINOPRIL AND HYDROCHLOROTHIAZIDE 12.5; 1 MG/1; MG/1
1 TABLET ORAL DAILY
Qty: 90 TABLET | Refills: 1 | Status: SHIPPED | OUTPATIENT
Start: 2023-01-12

## 2023-01-12 NOTE — PROGRESS NOTES
Simi Dangelo presents to [facility].    Chief Complaint: hypercholesterolemia follow up    Subjective       History of Present Illness:  HPI  Dx with pure hypercholesterolemia; current treatment includes Lipitor 10 mg daily and a low cholesterol/low fat diet.  Compliance with treatment has been good; she maintains her low cholesterol diet.  She denies experiencing any hypercholesterolemia related symptoms.             Concerning essential (primary) hypertension, this was first diagnosed more than 5 years ago.  She is not using any nonpharmacologic treatment modalities.  Her current cardiac medication regimen includes a diuretic HCTZ and an ACE inhibitor.  Compliance with treatment has been good; she takes her medication as directed, maintains her diet and exercise regimen, and follows up as directed.  She is tolerating the medication well without side effects.  Ms. 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   HENT: Negative for ear pain, sinus pressure and sore throat.    Eyes: Negative for blurred vision and double vision.   Respiratory: Negative for cough, shortness of breath and wheezing.    Cardiovascular: Negative for chest pain, palpitations and leg swelling.   Gastrointestinal: Negative for abdominal pain, blood in stool, constipation, diarrhea, nausea and vomiting.   Skin: Negative for rash.   Neurological: Negative for dizziness  and headache.   Psychiatric/Behavioral: Negative for depressed mood.        Objective   Medical History:  Past Medical History:   • Allergy, unspecified, subsequent encounter   • Bilateral primary osteoarthritis of knee   • Decreased white blood cell count, unspecified   • Essential (primary) hypertension   • Full incontinence of feces   • Hemorrhoids   • History of total bilateral knee replacement (TKR)   • Hypercholesteremia   • Hypertension   • Morbid (severe) obesity due to excess calories (HCC)   • Overweight   • Pain involving joints of fingers of both hands   • Presence of unspecified artificial knee joint   • Pure hypercholesterolemia   • Residual hemorrhoidal skin tag   • Varicose veins of bilateral lower extremities with pain     Past Surgical History:   • APPENDECTOMY   • HYSTERECTOMY    with left oophrectomy   • TONSILLECTOMY AND ADENOIDECTOMY   • TUMOR REMOVAL    Left thumb      Family History   Problem Relation Age of Onset   • Pulmonary embolism Brother      Social History     Tobacco Use   • Smoking status: Never   • Smokeless tobacco: Never   Substance Use Topics   • Alcohol use: Never       There are no preventive care reminders to display for this patient.     Immunization History   Administered Date(s) Administered   • COVID-19 (PFIZER) BIVALENT BOOSTER 12+YRS 11/07/2022   • COVID-19 (PFIZER) PURPLE CAP 02/13/2021, 03/09/2021, 11/08/2021   • Covid-19 (Pfizer) Gray Cap 07/12/2022   • Fluzone High Dose =>65 Years (Vaxcare ONLY) 10/04/2017, 10/27/2020   • Fluzone High-Dose 65+yrs 10/05/2021, 10/24/2022   • Hepatitis A 06/14/2018   • Pneumococcal Conjugate 13-Valent (PCV13) 01/11/2016   • Pneumococcal Polysaccharide (PPSV23) 12/06/2013   • Shingrix 08/03/2022, 01/05/2023   • Td 02/02/2008, 09/15/2011   • Tdap 07/15/2022   • Zostavax 06/11/2013       No Known Allergies     Medications:  Current Outpatient Medications on File Prior to Visit   Medication Sig   • fexofenadine (ALLEGRA) 180 MG tablet Take  "1 tablet by mouth Daily.   • ketotifen (ZADITOR) 0.025 % ophthalmic solution 1 drop As Needed.   • [DISCONTINUED] atorvastatin (LIPITOR) 10 MG tablet Take 1 tablet by mouth Daily.   • [DISCONTINUED] fluticasone (FLONASE) 50 MCG/ACT nasal spray 2 sprays into the nostril(s) as directed by provider Daily.   • [DISCONTINUED] lisinopril-hydrochlorothiazide (PRINZIDE,ZESTORETIC) 10-12.5 MG per tablet Take 1 tablet by mouth Daily.   • Zoster Vac Recomb Adjuvanted (Shingrix) 50 MCG/0.5ML reconstituted suspension Inject into the appropriate muscle as directed by prescriber.     No current facility-administered medications on file prior to visit.       Vital Signs:   /77 (BP Location: Left arm, Patient Position: Sitting, Cuff Size: Adult)   Pulse 68   Temp 98.4 °F (36.9 °C) (Oral)   Ht 160 cm (63\")   Wt 84.6 kg (186 lb 9.6 oz)   SpO2 98%   BMI 33.05 kg/m²       Physical Exam:  Physical Exam  Vitals and nursing note reviewed.   Constitutional:       General: She is not in acute distress.     Appearance: Normal appearance. She is not ill-appearing, toxic-appearing or diaphoretic.   HENT:      Head: Normocephalic and atraumatic.      Right Ear: Tympanic membrane, ear canal and external ear normal.      Left Ear: Tympanic membrane, ear canal and external ear normal.      Nose: No congestion or rhinorrhea.      Mouth/Throat:      Mouth: Mucous membranes are moist.      Pharynx: Oropharynx is clear. No oropharyngeal exudate or posterior oropharyngeal erythema.   Eyes:      Extraocular Movements: Extraocular movements intact.      Conjunctiva/sclera: Conjunctivae normal.      Pupils: Pupils are equal, round, and reactive to light.   Cardiovascular:      Rate and Rhythm: Normal rate and regular rhythm.      Heart sounds: Normal heart sounds.   Pulmonary:      Effort: Pulmonary effort is normal.      Breath sounds: Normal breath sounds. No wheezing, rhonchi or rales.   Abdominal:      General: Abdomen is flat.      " Palpations: Abdomen is soft.   Musculoskeletal:      Cervical back: Neck supple. No rigidity.   Lymphadenopathy:      Cervical: No cervical adenopathy.   Skin:     General: Skin is warm and dry.          Neurological:      Mental Status: She is alert and oriented to person, place, and time.   Psychiatric:         Mood and Affect: Mood normal.         Behavior: Behavior normal.         Result Review      The following data was reviewed by Shanika Crawford MD on 01/12/2023.  Lab Results   Component Value Date    WBC 4.60 07/12/2022    HGB 12.6 07/12/2022    HCT 40.8 07/12/2022    MCV 85.9 07/12/2022     07/12/2022     Lab Results   Component Value Date    GLUCOSE 96 07/12/2022    BUN 13 07/12/2022    CREATININE 0.69 07/12/2022    EGFRIFAFRI 110 12/29/2021    BCR 18.8 07/12/2022    K 4.3 07/12/2022    CO2 28.9 07/12/2022    CALCIUM 9.6 07/12/2022    ALBUMIN 4.40 07/12/2022    LABIL2 1.4 12/14/2020    AST 18 07/12/2022    ALT 20 07/12/2022     Lab Results   Component Value Date    CHOL 181 07/12/2022    CHLPL 178 12/14/2020    TRIG 84 07/12/2022    HDL 67 (H) 07/12/2022    LDL 99 07/12/2022     No results found for: TSH  No results found for: HGBA1C  No results found for: PSA           Incision & Drainage    Date/Time: 1/12/2023 10:58 AM  Performed by: Shanika Crawford MD  Authorized by: Shanika Crawford MD   Type: cyst  Body area: trunk  Location details: back    Sedation:  Patient sedated: no    Complexity: simple  Drainage amount: moderate  Wound treatment: wound left open  Patient tolerance: patient tolerated the procedure well with no immediate complications  Comments: About 0.5 cc of sebaceum removed, pt tolerated well, no blood or discharge otherwise, no sign of infection.[facility]              Assessment and Plan:          Diagnoses and all orders for this visit:    1. Inclusion cyst (Primary)  Comments:  left upper back-cyst expressed for her manually, no instrumentation used, no signs of  infection    2. Hypercholesteremia  Comments:  Stable on atorvastatin.  Tolerating medication well.  Previous labs reviewed and new labs ordered  Orders:  -     atorvastatin (LIPITOR) 10 MG tablet; Take 1 tablet by mouth Daily.  Dispense: 90 tablet; Refill: 1  -     Lipid Panel; Future    3. Allergic rhinitis due to pollen, unspecified seasonality  Comments:  Stable on Allegra and Flonase nasal spray.  Meds refilled  Orders:  -     fluticasone (FLONASE) 50 MCG/ACT nasal spray; 2 sprays into the nostril(s) as directed by provider Daily.  Dispense: 48 g; Refill: 3    4. Primary hypertension  Comments:  Very well controlled on current medications.  We will continue current treatment plan  Orders:  -     lisinopril-hydrochlorothiazide (PRINZIDE,ZESTORETIC) 10-12.5 MG per tablet; Take 1 tablet by mouth Daily.  Dispense: 90 tablet; Refill: 1  -     Comprehensive Metabolic Panel; Future    5. Tendonitis  Comments:  pt reassured, can use ice and topical diclofenac to area when it flares up    6. Osteoarthritis of both hands, unspecified osteoarthritis type  Comments:  can use ice and topical diclofenac to area when it flares up    Other orders  -     Incision & Drainage          Follow Up   Return in about 6 months (around 7/12/2023) for Annual physical.

## 2023-01-13 LAB
ALBUMIN SERPL-MCNC: 4.4 G/DL (ref 3.5–5.2)
ALBUMIN/GLOB SERPL: 1.6 G/DL
ALP SERPL-CCNC: 98 U/L (ref 39–117)
ALT SERPL W P-5'-P-CCNC: 16 U/L (ref 1–33)
ANION GAP SERPL CALCULATED.3IONS-SCNC: 5.4 MMOL/L (ref 5–15)
AST SERPL-CCNC: 21 U/L (ref 1–32)
BILIRUB SERPL-MCNC: 0.7 MG/DL (ref 0–1.2)
BUN SERPL-MCNC: 13 MG/DL (ref 8–23)
BUN/CREAT SERPL: 17.1 (ref 7–25)
CALCIUM SPEC-SCNC: 9.6 MG/DL (ref 8.6–10.5)
CHLORIDE SERPL-SCNC: 99 MMOL/L (ref 98–107)
CHOLEST SERPL-MCNC: 172 MG/DL (ref 0–200)
CO2 SERPL-SCNC: 32.6 MMOL/L (ref 22–29)
CREAT SERPL-MCNC: 0.76 MG/DL (ref 0.57–1)
EGFRCR SERPLBLD CKD-EPI 2021: 82.3 ML/MIN/1.73
GLOBULIN UR ELPH-MCNC: 2.8 GM/DL
GLUCOSE SERPL-MCNC: 95 MG/DL (ref 65–99)
HDLC SERPL-MCNC: 57 MG/DL (ref 40–60)
LDLC SERPL CALC-MCNC: 101 MG/DL (ref 0–100)
LDLC/HDLC SERPL: 1.75 {RATIO}
POTASSIUM SERPL-SCNC: 4.1 MMOL/L (ref 3.5–5.2)
PROT SERPL-MCNC: 7.2 G/DL (ref 6–8.5)
SODIUM SERPL-SCNC: 137 MMOL/L (ref 136–145)
TRIGL SERPL-MCNC: 76 MG/DL (ref 0–150)
VLDLC SERPL-MCNC: 14 MG/DL (ref 5–40)

## 2023-06-22 ENCOUNTER — TELEPHONE (OUTPATIENT)
Dept: FAMILY MEDICINE CLINIC | Age: 73
End: 2023-06-22

## 2023-06-22 NOTE — TELEPHONE ENCOUNTER
Caller: Simi Dangelo    Relationship: Self    Best call back number: [phone]     What orders are you requesting (i.e. lab or imaging): LABS AND MAMMOGRAM    In what timeframe would the patient need to come in: LABS BEFORE APPOINTMENT SCHEDULED 7.17.2023 WITH ENOUGH TIME FOR RESULTS TO COME BACK BEFORE APPOINTMENT- MAMMOGRAM THE SAME DAY OF APPOINTMENT IF POSSIBLE 7.17.2023     Where will you receive your lab/imaging services: [facility]

## 2023-08-29 ENCOUNTER — OFFICE VISIT (OUTPATIENT)
Dept: FAMILY MEDICINE CLINIC | Age: 73
End: 2023-08-29
Payer: MEDICARE

## 2023-08-29 ENCOUNTER — HOSPITAL ENCOUNTER (OUTPATIENT)
Dept: GENERAL RADIOLOGY | Facility: HOSPITAL | Age: 73
Discharge: HOME | End: 2023-08-29
Admitting: FAMILY MEDICINE
Payer: MEDICARE

## 2023-08-29 VITALS
WEIGHT: 185 LBS | SYSTOLIC BLOOD PRESSURE: 131 MMHG | DIASTOLIC BLOOD PRESSURE: 69 MMHG | OXYGEN SATURATION: 99 % | BODY MASS INDEX: 32.78 KG/M2 | HEART RATE: 70 BPM | HEIGHT: 63 IN
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
## 2023-08-29 DIAGNOSIS — M25.512 ACUTE PAIN OF LEFT SHOULDER: ICD-10-CM

## 2023-08-29 DIAGNOSIS — M54.2 NECK PAIN: ICD-10-CM

## 2023-08-29 DIAGNOSIS — M25.522 LEFT ELBOW PAIN: ICD-10-CM

## 2023-08-29 DIAGNOSIS — M25.532 LEFT WRIST PAIN: ICD-10-CM

## 2023-08-29 PROCEDURE — 73030 X-RAY EXAM OF SHOULDER: CPT

## 2023-08-29 PROCEDURE — 72040 X-RAY EXAM NECK SPINE 2-3 VW: CPT

## 2023-08-29 RX ORDER — CYCLOBENZAPRINE HCL 10 MG
10 TABLET ORAL
COMMUNITY
Start: 2023-08-26 | End: 2023-09-05

## 2023-08-29 NOTE — PROGRESS NOTES
Simi Dangelo presents to [facility].    Chief Complaint:  fall    Subjective     History of Present Illness:  Joint Swelling  Associated symptoms include joint swelling. Pertinent negatives include no abdominal pain, chest pain, chills, coughing, fatigue, fever, nausea, rash, sore throat or vomiting.   She was up on a step stool about 2 1/2  feet up and reached for something down below her on the couch and she toppled over into the wall and reached out her L arm and it got twisted as she went down and she landed against with her neck being injured, but she is not sure how or really what happened, this occurred 2 weeks ago and she is still in pain.  She went to urgent care this past Saturday, and she had xrays elbow and wrist performed and told they were neg for findings.  She was started back on her meloxicam 15 mg daily and given a new RX for flexeril.  At rest she has no pain but when she lays down the pain aches, dull ache, pressure in her neck. Pain is in her L upper arm and stinging in her L hand (this has resolved).      Result Review   The following data was reviewed by Shanika Crawford MD on 08/29/2023.  Lab Results   Component Value Date    WBC 3.74 07/07/2023    HGB 11.7 (L) 07/07/2023    HCT 36.7 07/07/2023    MCV 85.0 07/07/2023     07/07/2023     Lab Results   Component Value Date    GLUCOSE 100 (H) 07/07/2023    BUN 12 07/07/2023    CREATININE 0.70 07/07/2023    EGFR 90.3 07/07/2023    BCR 17.1 07/07/2023    K 3.9 07/07/2023    CO2 26.7 07/07/2023    CALCIUM 9.5 07/07/2023    ALBUMIN 3.9 07/07/2023    BILITOT 0.5 07/07/2023    AST 18 07/07/2023    ALT 17 07/07/2023     Lab Results   Component Value Date    CHOL 148 07/07/2023    CHLPL 178 12/14/2020    TRIG 56 07/07/2023    HDL 56 07/07/2023    LDL 80 07/07/2023     No results found for: TSH  No results found for: HGBA1C  No results found for: PSA      No results found for: OXIN84MM            Assessment and  "Plan:   Diagnoses and all orders for this visit:    1. Fall, subsequent encounter (Primary)    2. Neck pain  -     XR Spine Cervical 2 or 3 View; Future  -     XR Shoulder 2+ View Left; Future    3. Acute pain of left shoulder  -     XR Spine Cervical 2 or 3 View; Future  -     XR Shoulder 2+ View Left; Future    4. Left wrist pain    5. Left elbow pain              Objective     Medications:  Current Outpatient Medications   Medication Instructions    atorvastatin (LIPITOR) 10 mg, Oral, Daily    cyclobenzaprine (FLEXERIL) 10 mg, Oral    fexofenadine (ALLEGRA) 180 mg, Oral, Daily    fluticasone (FLONASE) 50 MCG/ACT nasal spray 2 sprays, Nasal, Daily    ketotifen (ZADITOR) 0.025 % ophthalmic solution 1 drop, As Needed    lisinopril-hydrochlorothiazide (PRINZIDE,ZESTORETIC) 10-12.5 MG per tablet 1 tablet, Oral, Daily    meloxicam (MOBIC) 7.5 mg, Oral, As Needed    Zoster Vac Recomb Adjuvanted (Shingrix) 50 MCG/0.5ML reconstituted suspension Inject into the appropriate muscle as directed by prescriber.        Vital Signs:   /69 (BP Location: Right arm, Patient Position: Sitting)   Pulse 70   Ht 160 cm (62.99\")   Wt 83.9 kg (185 lb)   SpO2 99%   BMI 32.78 kg/mý             Physical Exam:  Physical Exam  Vitals and nursing note reviewed.   Constitutional:       General: She is not in acute distress.     Appearance: Normal appearance. She is not ill-appearing, toxic-appearing or diaphoretic.   HENT:      Head: Normocephalic and atraumatic.      Right Ear: Tympanic membrane, ear canal and external ear normal.      Left Ear: Tympanic membrane, ear canal and external ear normal.      Nose: No congestion or rhinorrhea.      Mouth/Throat:      Mouth: Mucous membranes are moist.      Pharynx: Oropharynx is clear. No oropharyngeal exudate or posterior oropharyngeal erythema.   Eyes:      Extraocular Movements: Extraocular movements intact.      Conjunctiva/sclera: Conjunctivae normal.      Pupils: Pupils are " equal, round, and reactive to light.   Cardiovascular:      Rate and Rhythm: Normal rate and regular rhythm.      Heart sounds: Normal heart sounds.   Pulmonary:      Effort: Pulmonary effort is normal.      Breath sounds: Normal breath sounds. No wheezing, rhonchi or rales.   Abdominal:      General: Abdomen is flat.      Palpations: Abdomen is soft. There is no mass.      Tenderness: There is no abdominal tenderness.      Hernia: No hernia is present.   Musculoskeletal:      Right shoulder: Tenderness present. No deformity or crepitus. Normal range of motion. Normal strength. Normal pulse.      Right elbow: No swelling, deformity or effusion. Normal range of motion. No tenderness.      Cervical back: Neck supple. No rigidity.      Right lower leg: No edema.      Left lower leg: No edema.      Comments: 5 out of 5 musculoskeletal strength in the upper extremity bilaterally with normal sensory exam in the upper extremity bilaterally.  2/4 brachial radial DTR bilaterally.   Nontender to palpation over the cervical spine but tender over trapezius, no bruising or skin changes.  No muscle spasm noted in the para cervical muscles bilaterally       Lymphadenopathy:      Cervical: No cervical adenopathy.   Skin:     General: Skin is warm and dry.   Neurological:      General: No focal deficit present.      Mental Status: She is alert and oriented to person, place, and time. Mental status is at baseline.   Psychiatric:         Mood and Affect: Mood normal.         Behavior: Behavior normal.         Thought Content: Thought content normal.         Judgment: Judgment normal.         Review of Systems:  Review of Systems   Constitutional:  Negative for chills, fatigue and fever.   HENT:  Negative for ear pain, sinus pressure and sore throat.    Eyes:  Negative for blurred vision and double vision.   Respiratory:  Negative for cough, shortness of breath and wheezing.    Cardiovascular:  Negative for chest pain and palpitations.    Gastrointestinal:  Negative for abdominal pain, blood in stool, constipation, diarrhea, nausea and vomiting.   Musculoskeletal:  Positive for joint swelling.   Skin:  Negative for rash.   Neurological:  Negative for dizziness and headache.   Psychiatric/Behavioral:  Negative for sleep disturbance, suicidal ideas and depressed mood. The patient is not nervous/anxious.             Follow Up   Return if symptoms worsen or fail to improve.    Part of this note may be an electronic transcription/translation of spoken language to printed   text using the Dragon Dictation System.            Medical History:  There are no discontinued medications.   Past Medical History:    Allergy, unspecified, subsequent encounter    Bilateral primary osteoarthritis of knee    Decreased white blood cell count, unspecified    Essential (primary) hypertension    Full incontinence of feces    Hemorrhoids    History of total bilateral knee replacement (TKR)    Hypercholesteremia    Hypertension    Morbid (severe) obesity due to excess calories    Overweight    Pain involving joints of fingers of both hands    Presence of unspecified artificial knee joint    Pure hypercholesterolemia    Residual hemorrhoidal skin tag    Varicose veins of bilateral lower extremities with pain     Past Surgical History:    APPENDECTOMY    HYSTERECTOMY    with left oophrectomy    TONSILLECTOMY AND ADENOIDECTOMY    TUMOR REMOVAL    Left thumb      Family History   Problem Relation Age of Onset    Pulmonary embolism Brother      Social History     Tobacco Use    Smoking status: Never    Smokeless tobacco: Never   Substance Use Topics    Alcohol use: Never       There are no preventive care reminders to display for this patient.     Immunization History   Administered Date(s) Administered    COVID-19 (PFIZER) BIVALENT 12+YRS 11/07/2022    COVID-19 (PFIZER) Purple Cap Monovalent 02/13/2021, 03/09/2021, 11/08/2021    Covid-19 (Pfizer) Gray  Cap Monovalent 07/12/2022    Fluzone High Dose =>65 Years (Vaxcare ONLY) 10/04/2017, 10/27/2020    Fluzone High-Dose 65+yrs 10/04/2017, 10/27/2020, 10/05/2021, 10/24/2022    Hep A, 2 Dose 06/14/2018    Hepatitis A 06/14/2018    Pneumococcal Conjugate 13-Valent (PCV13) 01/11/2016    Pneumococcal Polysaccharide (PPSV23) 12/06/2013    Shingrix 08/03/2022, 01/05/2023    Td (TDVAX) 02/02/2008, 09/15/2011    Tdap 07/15/2022    Zostavax 06/11/2013       No Known Allergies

## 2023-08-31 DIAGNOSIS — M25.512 ACUTE PAIN OF LEFT SHOULDER: Primary | ICD-10-CM

## 2023-09-06 ENCOUNTER — HOSPITAL ENCOUNTER (OUTPATIENT)
Dept: MAMMOGRAPHY | Facility: HOSPITAL | Age: 73
Discharge: HOME | End: 2023-09-06
Admitting: FAMILY MEDICINE
Payer: MEDICARE

## 2023-09-06 DIAGNOSIS — Z12.31 ENCOUNTER FOR SCREENING MAMMOGRAM FOR BREAST CANCER: ICD-10-CM

## 2023-09-06 PROCEDURE — 77063 BREAST TOMOSYNTHESIS BI: CPT

## 2023-09-06 PROCEDURE — 77067 SCR MAMMO BI INCL CAD: CPT

## 2023-09-07 ENCOUNTER — OFFICE VISIT (OUTPATIENT)
Dept: ORTHOPEDIC SURGERY | Facility: CLINIC | Age: 73
End: 2023-09-07
Payer: MEDICARE

## 2023-09-07 VITALS
DIASTOLIC BLOOD PRESSURE: 79 MMHG | WEIGHT: 180 LBS | BODY MASS INDEX: 31.89 KG/M2 | SYSTOLIC BLOOD PRESSURE: 153 MMHG | HEART RATE: 87 BPM | HEIGHT: 63 IN | OXYGEN SATURATION: 96 %

## 2023-09-07 DIAGNOSIS — M75.102 TEAR OF LEFT ROTATOR CUFF, UNSPECIFIED TEAR EXTENT, UNSPECIFIED WHETHER TRAUMATIC: Primary | ICD-10-CM

## 2023-09-07 DIAGNOSIS — M19.012 PRIMARY OSTEOARTHRITIS OF LEFT SHOULDER: ICD-10-CM

## 2023-09-07 RX ORDER — TRIAMCINOLONE ACETONIDE 40 MG/ML
40 INJECTION, SUSPENSION INTRA-ARTICULAR; INTRAMUSCULAR
Status: COMPLETED | OUTPATIENT
Start: 2023-09-07 | End: 2023-09-07

## 2023-09-07 RX ORDER — LIDOCAINE HYDROCHLORIDE 10 MG/ML
5 INJECTION, SOLUTION INFILTRATION; PERINEURAL
Status: COMPLETED | OUTPATIENT
Start: 2023-09-07 | End: 2023-09-07

## 2023-09-07 RX ADMIN — LIDOCAINE HYDROCHLORIDE 5 ML: 10 INJECTION, SOLUTION INFILTRATION; PERINEURAL at 11:11

## 2023-09-07 RX ADMIN — TRIAMCINOLONE ACETONIDE 40 MG: 40 INJECTION, SUSPENSION INTRA-ARTICULAR; INTRAMUSCULAR at 11:11

## 2023-09-07 NOTE — PROGRESS NOTES
"Chief Complaint  Initial Evaluation of the Left Shoulder     Subjective      Ada X Antolin presents to [facility] for evaluation of left shoulder pain. She reports the pain will start at the top of the shoulder and radiate down the shoulder into the elbow. She reports pain at night as well. She reports pins and needles of the arm. She reports symptoms over the last several weeks and fell off the step stool. She has been lifting a 2 year old which has not helped with her pain. She has been taking meloxicam and flexeril. She was seen with xrays of the shoulder and the neck.     No Known Allergies     Social History     Socioeconomic History    Marital status: Single   Tobacco Use    Smoking status: Never    Smokeless tobacco: Never   Vaping Use    Vaping Use: Never used   Substance and Sexual Activity    Alcohol use: Never    Drug use: Never        I reviewed the patient's chief complaint, history of present illness, review of systems, past medical history, surgical history, family history, social history, medications, and allergy list.     Review of Systems     Constitutional: Denies fevers, chills, weight loss  Cardiovascular: Denies chest pain, shortness of breath  Skin: Denies rashes, acute skin changes  Neurologic: Denies headache, loss of consciousness  MSK: left shoulder      Vital Signs:   /79 Comment: Pt aware of his BP and advised to contact her PCP  Pulse 87   Ht 160 cm (63\")   Wt 81.6 kg (180 lb)   SpO2 96%   BMI 31.89 kg/m²          Physical Exam  General: Alert. No acute distress    Ortho Exam      Left shoulder: skin intact, no swelling or atrophy, nontender to palpation, neuro intact, forward elevation 150, abduction 110 degrees,ER 70 degrees, IR 40 degrees, 4/5 supraspinatus, 5/5 infraspinatus and subscapularis, IR to T12, tenderness over lateral elbow    Left shoulder  Date/Time: 9/7/2023 11:11 AM  Consent given by: patient  Site marked: site marked  Timeout: " Immediately prior to procedure a time out was called to verify the correct patient, procedure, equipment, support staff and site/side marked as required   Supporting Documentation  Indications: pain   Procedure Details  Location: shoulder (Left shoulder) -   Needle gauge: 21G.  Medications administered: 5 mL lidocaine 1 %; 40 mg triamcinolone acetonide 40 MG/ML  Patient tolerance: patient tolerated the procedure well with no immediate complications            Imaging Results (Most Recent)       None             Result Review :       XR Spine Cervical 2 or 3 View    Result Date: 8/29/2023  Narrative: PROCEDURE: XR SPINE CERVICAL 2 OR 3 VW  COMPARISON: None  INDICATIONS: POSTERIOR NECK PAIN AFTER FALL X 2 WEEKS  FINDINGS:  There is no acute fracture or dislocation.  There is multilevel endplate spurring consistent with spondylosis.  The facet joints are superimposed on the lateral view but appear unremarkable on the AP view.  The atlantoaxial articulation and craniocervical junctions are intact.  The prevertebral soft tissues are normal.       Impression:   1. No acute fracture or dislocation. 2. Degenerative changes.     NAKUL CRUM MD       Electronically Signed and Approved By: NAKUL CRUM MD on 8/29/2023 at 17:34             XR Shoulder 2+ View Left    Result Date: 8/29/2023  Narrative: PROCEDURE: XR SHOULDER 2+ VW LEFT  COMPARISON: None  INDICATIONS: GENERAL LEFT SHOULDER PAIN / LIMITED RANGE OF MOTION AFTER FALL X 2 WEEKS  FINDINGS:  The humeral head is high riding abutting the undersurface of the acromion which can be seen with rotator cuff osteoarthropathy.  There is narrowing and spurring of the glenohumeral joint consistent with moderate osteoarthritis.  There are also moderate osteoarthritic changes involving the acromioclavicular joint.  There is no acute fracture.  The soft tissues are unremarkable.      Impression:   1. High riding humeral head which can be seen with rotator cuff osteoarthropathy. 2.  Degenerative changes. 3. No acute fracture.      NAKUL CRUM MD       Electronically Signed and Approved By: NAKUL CRUM MD on 8/29/2023 at 17:45             XR wrist complete 3 views min left    Result Date: 8/27/2023  Narrative: LEFT ELBOW, 3 VIEWS HISTORY: Fall one week prior, continued pain. FINDINGS: Three views of the left elbow were performed. There is no joint effusion. There are no soft tissue abnormalities. There is normal alignment. The radial head is intact. There is mild spurring of the epicondylar regions and of the coronoid process. There is no acute bony abnormality.    Impression: Minimal degenerative changes with no acute bony abnormalities. LEFT WRIST, THREE VIEWS HISTORY: Fall one week prior, continued pain. FINDINGS: Three views of the left wrist were performed. There is mild radiocarpal joint space narrowing. The scaphoid is intact. There are degenerative changes with mild narrowing of the first CMC joint. IMPRESSION: Mild degenerative changes with no acute bony abnormalities.    XR elbow 3 views min left    Result Date: 8/27/2023  Narrative: LEFT ELBOW, 3 VIEWS HISTORY: Fall one week prior, continued pain. FINDINGS: Three views of the left elbow were performed. There is no joint effusion. There are no soft tissue abnormalities. There is normal alignment. The radial head is intact. There is mild spurring of the epicondylar regions and of the coronoid process. There is no acute bony abnormality.    Impression: Minimal degenerative changes with no acute bony abnormalities. LEFT WRIST, THREE VIEWS HISTORY: Fall one week prior, continued pain. FINDINGS: Three views of the left wrist were performed. There is mild radiocarpal joint space narrowing. The scaphoid is intact. There are degenerative changes with mild narrowing of the first CMC joint. IMPRESSION: Mild degenerative changes with no acute bony abnormalities.    Mammo Screening Digital Tomosynthesis Bilateral With CAD    Result Date:  9/6/2023  Narrative: PROCEDURE: MAMMO SCREENING DIGITAL TOMOSYNTHESIS BILATERAL W CAD  COMPARISON: [facility], DIG SCREENING BILAT SARAI W 3D LASHONDA, 12/14/2020, 12:39.  [facility], DIG SCREENING BILAT SARAI W 3D LASHONDA, 11/11/2019, 11:44.  [facility], DIG DIAG RIGHT SARAI W 3D LASHONDA, 11/21/2018, 14:18.  [facility], [facility], MAMMO SCREENING DIGITAL TOMOSYNTHESIS BILATERAL W CAD, 3/18/2022, 16:44.  VIEWS:  BILATERAL CC AND MLO VIEWS WERE OBTAINED UTILIZING 3D TOMOSYNTHESIS AND R2 CAD SOFTWARE  INDICATIONS: screening mammogram  FINDINGS:  No suspicious mass, area of architectural distortion or suspicious microcalcification is identified.      Impression:  Benign mammogram. Suggest routine mammographic screening.  RECOMMENDATION(S):  ROUTINE MAMMOGRAM AND CLINICAL EVALUATION IN 12 MONTHS.   BIRADS:  DIAGNOSTIC CATEGORY 1--NEGATIVE.   BREAST COMPOSITION: Scattered areas fibroglandular density.  PLEASE NOTE:  A NORMAL MAMMOGRAM DOES NOT EXCLUDE THE POSSIBILITY OF BREAST CANCER. ANY CLINICALLY SUSPICIOUS PALPABLE LUMP SHOULD BE BIOPSIED.      SLOAN PEREZ MD       Electronically Signed and Approved By: SLOAN PEREZ MD on 9/06/2023 at 14:52                     Assessment and Plan     Diagnoses and all orders for this visit:    1. Tear of left rotator cuff, unspecified tear extent, unspecified whether traumatic (Primary)  Comments:  possible tear    2. Primary osteoarthritis of left shoulder        We discussed her diagnosis and treatment plan. We recommended an MRI of the shoulder to access the rotator cuff. She wished to proceed with MRI and have an injection. She already has therapy ordered.     Call or return if worsening symptoms.    Follow Up     After MRI      Patient was given instructions and counseling regarding her condition or for health maintenance advice. Please see specific information pulled into the AVS if appropriate.     Scribed for Arnie NARANJO  MD Lance by Carlota Ordaz.  09/07/23   11:02 EDT    I have personally performed the services described in this document as scribed by the above individual and it is both accurate and complete. Arnie Lucas MD 09/07/23

## 2023-09-18 ENCOUNTER — TELEPHONE (OUTPATIENT)
Dept: ORTHOPEDIC SURGERY | Facility: CLINIC | Age: 73
End: 2023-09-18

## 2023-09-18 NOTE — TELEPHONE ENCOUNTER
Hub staff attempted to follow warm transfer process and was unsuccessful     Caller: Simi Dangelo    Relationship to patient: Self    Best call back number: [phone] (home)       Patient is needing: MRI FOLLOWUP- LEFT SHOULDER- MRI SCHEDULED 9/25/2023    I SCHEDULED PATIENT FOR FOLLOW UP at  9:30 AM Monday October 30, 2023 AT [facility].  Appointment Provider:Ed Johnson. IT WAS THE FIRST AVAILABLE HOWEVER, PATIENT WOULD LIKE A SOONER APPOINTMENT. PATIENT ALSO,  PREFERS THE [facility] LOCATION

## 2023-09-25 ENCOUNTER — HOSPITAL ENCOUNTER (OUTPATIENT)
Dept: MRI IMAGING | Facility: HOSPITAL | Age: 73
Discharge: HOME | End: 2023-09-25
Admitting: ORTHOPAEDIC SURGERY
Payer: MEDICARE

## 2023-09-25 DIAGNOSIS — M19.012 PRIMARY OSTEOARTHRITIS OF LEFT SHOULDER: ICD-10-CM

## 2023-09-25 DIAGNOSIS — M75.102 TEAR OF LEFT ROTATOR CUFF, UNSPECIFIED TEAR EXTENT, UNSPECIFIED WHETHER TRAUMATIC: ICD-10-CM

## 2023-09-25 PROCEDURE — 73221 MRI JOINT UPR EXTREM W/O DYE: CPT

## 2023-09-27 ENCOUNTER — TREATMENT (OUTPATIENT)
Dept: PHYSICAL THERAPY | Facility: CLINIC | Age: 73
End: 2023-09-27
Payer: MEDICARE

## 2023-09-27 DIAGNOSIS — R29.898 LEFT ARM WEAKNESS: ICD-10-CM

## 2023-09-27 DIAGNOSIS — M25.612 DECREASED ROM OF LEFT SHOULDER: ICD-10-CM

## 2023-09-27 DIAGNOSIS — M25.512 ACUTE PAIN OF LEFT SHOULDER: Primary | ICD-10-CM

## 2023-09-27 PROCEDURE — 97161 PT EVAL LOW COMPLEX 20 MIN: CPT | Performed by: PHYSICAL THERAPIST

## 2023-09-27 PROCEDURE — 97140 MANUAL THERAPY 1/> REGIONS: CPT | Performed by: PHYSICAL THERAPIST

## 2023-09-27 PROCEDURE — 97110 THERAPEUTIC EXERCISES: CPT | Performed by: PHYSICAL THERAPIST

## 2023-09-27 NOTE — PROGRESS NOTES
Physical Therapy Initial Evaluation and Plan of Care      Patient: Simi Dangelo   : 1950  Diagnosis/ICD-10 Code:  Acute pain of left shoulder [M25.512]  Referring practitioner: Shanika Crawford MD  Date of Initial Visit: 2023  Today's Date: 2023  Patient seen for 1 sessions         Visit Diagnoses:    ICD-10-CM ICD-9-CM   1. Acute pain of left shoulder  M25.512 719.41   2. Decreased ROM of left shoulder  M25.612 719.51   3. Left arm weakness  R29.898 729.89         Subjective Evaluation    History of Present Illness  Mechanism of injury: Pt comes to OPPT comes today with L shoulder and neck pain.  She reports the pain will start at the top of the shoulder and radiate down into the L hand.  She has pins/needle like feeling in the L arm.  It is intermittent.  Depending on the way she holds the arm, it feels like something is crawling up the arm, other times pins feeling in the arm.      She thinks she has a lot of arthritis, even though not been diagnosed.  She gets pain in the R hand and has swelling/numbness, but when she moves around, it goes away.      She worked in factories for 45 years.     About 5-6 weeks ago, she reached for something, while standing on a two step ladder, missed the step and landed on her bottom with the R side/arm on the wall.  She had no pain for several days. She was reaching with the L arm.  The pain then started about two weeks after this incident.     Trouble with sleeping, does use a pillow under the L arm.  She typically likes to sleep on the L side, but it is hard to do.     Return to the doctor on 10/2/23, discuss MRI results.     L SHOULDER MRI MPRESSION:                 1. Full-thickness tears of the supraspinatus and infra spinatus tendons with moderate to severe muscle body atrophy  2. Mild atrophy of the teres minor muscle body   3. Mild acromioclavicular and moderate glenohumeral osteoarthritis  4. Small glenohumeral joint effusion with evidence of  synovitis  5. Tear of the superior labrum with extension into the biceps tendon  6. Moderate biceps tendinopathy           Pain  Current pain ratin  Quality: dull ache and radiating  Relieving factors: medications (Meloxicam)  Aggravating factors: prolonged positioning, sleeping, overhead activity, outstretched reach, repetitive movement and movement    Social Support  Lives in: one-story house  Lives with: alone    Hand dominance: right           Objective          Tests     Left Shoulder   Positive crossover, empty can and Hawkin's.      General Comments     Shoulder Comments   Posture: rounded shoulders    L shoulder AROM:  Flexion: 150 degrees   External rotation: 15 degrees   Internal rotation: 80 degrees  Abduction: 180 degrees    L shoulder MMT  Flexion: 4-/5  Extension: 4-/5  Abduction: 3/5  External rotation: 3+/5  Internal rotation: 3+/5    Radicular symptoms:  tingling, pin like feeling in the L arm, wrist, forearm    Tenderness: L upper trap, supraspinatus, AC joint, deltoid, lateral epicondyle, ulnar styloid, index finger           Assessment & Plan       Assessment  Impairments: abnormal muscle firing, abnormal or restricted ROM, activity intolerance, impaired physical strength, lacks appropriate home exercise program, pain with function and safety issue   Functional limitations: carrying objects, lifting, sleeping, pulling, pushing, uncomfortable because of pain, reaching behind back, reaching overhead and unable to perform repetitive tasks   Assessment details: Pt presents with limitations, noted below, that impede her ability to perform heavy lifting, opening jars, washing her back, recreational activities, and sleeping. The skills of a therapist will be required to safely and effectively implement the following treatment plan to restore maximal level of function.        Goals  Plan Goals: SHOULDER  PROBLEMS:     1. The patient has limited ROM of the left shoulder.    LTG 1: 12 weeks:  The  patient will demonstrate 180 degrees of left shoulder flexion, 180 degrees of shoulder abduction, 80 degrees of shoulder external rotation, and 80 degrees of shoulder internal rotation to allow the patient to reach into upper kitchen cabinets and manipulate clothing behind the back with greater ease.    STATUS:  New   STG 1a: 4 weeks:  The patient will demonstrate 160 degrees of right shoulder flexion, 45 degrees of shoulder external rotation.    STATUS:  New   TREATMENT: Manual therapy, therapeutic exercise, home exercise instruction, and modalities as needed to include: electrical stimulation, ultrasound, moist heat, and ice.    2. The patient has limited strength of the left shoulder.   LTG 2: 12 weeks:  The patient will demonstrate 4+ /5 strength for left shoulder flexion, abduction, external rotation, and internal rotation in order to demonstrate improved shoulder stability.    STATUS:  New   STG 2a: 4 weeks:  The patient will demonstrate 4 /5 strength for left shoulder flexion, abduction, external rotation, and internal rotation.    STATUS:  New   STG2b:  4 weeks:  The patient will be independent with home exercises.     STATUS:  New   TREATMENT: Manual therapy, therapeutic exercise, home exercise instruction, and modalities as needed to include: electrical stimulation, ultrasound, moist heat, and ice.     3. The patient complains of pain to the left shoulder.   LTG 3: 12 weeks:  The patient will report a pain rating of 2 /10 or better in order to improve sleep quality and tolerance to performance of activities of daily living.    STATUS:  New   STG 3a: 4 weeks:  The patient will report a pain rating of 4 /10 or better.     STATUS:  New   TREATMENT: Manual therapy, therapeutic exercise, home exercise instruction, and modalities as needed to include: electrical stimulation, ultrasound, moist heat, and ice.    4. Carrying, Moving, and Handling Objects Functional Limitation     LTG 4: 12 weeks:  The patient will  demonstrate  50% limitation by achieving a score on the QuickDASH.    STATUS:  New   STG 4a: 4 weeks:  The patient will demonstrate 70 % limitation by achieving a score on the QuickDASH.      STATUS:  New   TREATMENT:  Manual therapy, therapeutic exercise, home exercise instruction, and modalities as needed to include: moist heat, electrical stimulation, and ultrasound.    Plan  Therapy options: will be seen for skilled therapy services  Planned modality interventions: cryotherapy, dry needling, electrical stimulation/Russian stimulation, TENS, thermotherapy (hydrocollator packs) and ultrasound  Planned therapy interventions: manual therapy, flexibility, functional ROM exercises, home exercise program, therapeutic activities, stretching, strengthening, soft tissue mobilization, postural training, neuromuscular re-education, body mechanics training, joint mobilization, ADL retraining and fine motor coordination training  Frequency: 2x week  Duration in weeks: 12  Treatment plan discussed with: patient  Plan details: Review HEP, update as needed.  Review MD appt.     Progress with L shoulder strength, scapular stabilization, postural awareness, increased stamina, decreased tightness, improved ROM/flexibility, fine motor and coordination, education as needed.    Ada is going to the ortho on Monday for a follow up for her L shoulder MRI.  We will discuss further next visit if surgery is going to be needed and how to proceed with therapy, if so.  Strengthening and ROM activities will be valuable for her.           History # of Personal Factors and/or Comorbidities: MODERATE (1-2)  Examination of Body System(s): # of elements: LOW (1-2)  Clinical Presentation: STABLE   Clinical Decision Making: LOW     Timed:  Manual Therapy:    10    mins  32598;  Therapeutic Exercise:    13     mins  00815;     Neuromuscular Shadi:        mins  63899;    Therapeutic Activity:          mins  53686;     Gait Training:           mins   90013;     Ultrasound:          mins  76159;    Canalith Repos           ___  mins  47223      Untimed:  Electrical Stimulation:         mins  77228 ( );  Mechanical Traction:         mins  12306;   Dry Needling:                     mins self pay  Low Eval:                          25 mins  00421;  Medium Eval:                     mins  83780;   High Eval:                          mins  20840       Timed Treatment:   23   mins   Total Treatment:     48   mins    PT SIGNATURE: Yamilka Almaguer PT, DPT  [place] License: [id number]  Electronically signed by Yamilka Almaguer PT, 09/27/23, 1:06 PM EDT      Initial Certification    Certification Period: 9/27/2023 thru 12/25/2023  I certify that the therapy services are furnished while this patient is under my care.  The services outlined above are required by this patient, and will be reviewed every 90 days.     PHYSICIAN: Shanika Crawford MD  NPI: [id number]            PHYSICIAN PRINT NAME: ______________________________________________      PHYSICIAN SIGNATURE: ______________________________________________         DATE:________________________________        Please sign and return via fax to [phone].  Thank you, [facility].

## 2023-10-02 ENCOUNTER — OFFICE VISIT (OUTPATIENT)
Dept: ORTHOPEDIC SURGERY | Facility: CLINIC | Age: 73
End: 2023-10-02
Payer: MEDICARE

## 2023-10-02 ENCOUNTER — CLINICAL SUPPORT (OUTPATIENT)
Dept: FAMILY MEDICINE CLINIC | Age: 73
End: 2023-10-02
Payer: MEDICARE

## 2023-10-02 VITALS
WEIGHT: 179.9 LBS | HEIGHT: 63 IN | SYSTOLIC BLOOD PRESSURE: 164 MMHG | OXYGEN SATURATION: 98 % | DIASTOLIC BLOOD PRESSURE: 92 MMHG | HEART RATE: 84 BPM | BODY MASS INDEX: 31.88 KG/M2

## 2023-10-02 DIAGNOSIS — Z23 FLU VACCINE NEED: Primary | ICD-10-CM

## 2023-10-02 DIAGNOSIS — M75.102 TEAR OF LEFT ROTATOR CUFF, UNSPECIFIED TEAR EXTENT, UNSPECIFIED WHETHER TRAUMATIC: Primary | ICD-10-CM

## 2023-10-02 DIAGNOSIS — M19.012 PRIMARY OSTEOARTHRITIS OF LEFT SHOULDER: ICD-10-CM

## 2023-10-02 PROCEDURE — 90662 IIV NO PRSV INCREASED AG IM: CPT | Performed by: FAMILY MEDICINE

## 2023-10-02 PROCEDURE — G0008 ADMIN INFLUENZA VIRUS VAC: HCPCS | Performed by: FAMILY MEDICINE

## 2023-10-02 NOTE — PROGRESS NOTES
"Chief Complaint  Pain and Follow-up of the Left Shoulder    Subjective          History of Present Illness      Simi Dangelo is a 73 y.o. female  presents to [facility] for     Patient presents for follow-up evaluation of left shoulder pain, left shoulder injury and to review left shoulder MRI.  Patient saw Dr. Lucas initially for evaluation she had an injury several months ago she also has a history of tendinitis to the shoulder.  Patient had MRI ordered as she is here to review this.  She states left shoulder pain persists but is better than it was but has persisted and continues to bother her she has gone to 1 therapy visit she states the shoulder injection she received at last visit did help some.      No Known Allergies     Social History     Socioeconomic History    Marital status: Single   Tobacco Use    Smoking status: Never    Smokeless tobacco: Never   Vaping Use    Vaping Use: Never used   Substance and Sexual Activity    Alcohol use: Never    Drug use: Never        REVIEW OF SYSTEMS    Constitutional: Denies fevers, chills, weight loss  Cardiovascular: Denies chest pain, shortness of breath  Skin: Denies rashes, acute skin changes  Neurologic: Denies headache, loss of consciousness  MSK: Left shoulder pain      Objective   Vital Signs:   /92   Pulse 84   Ht 160 cm (63\")   Wt 81.6 kg (179 lb 14.3 oz)   SpO2 98%   BMI 31.87 kg/m²     Body mass index is 31.87 kg/m².    Physical Exam         Left shoulder: skin intact, no swelling or atrophy, nontender to palpation, neuro intact, forward elevation 150, abduction 110 degrees,ER 70 degrees, IR 40 degrees, 4/5 supraspinatus, 5/5 infraspinatus and subscapularis, IR to T12, tenderness over lateral elbow         Procedures    Imaging Results (Most Recent)       None         MRI Shoulder Left Without Contrast    Result Date: 9/25/2023  Narrative: PROCEDURE: MRI SHOULDER LEFT WO CONTRAST  COMPARISON: [facility] " KENIA, [facility], XR SHOULDER 2+ VW LEFT, 8/29/2023, 17:19.  INDICATIONS: LEFT SHOULDER PAIN WITH PAINFUL RANGE OF MOTION WORSENING SINCE FALL X 4WEEKS      TECHNIQUE: A variety of imaging planes and parameters were utilized for visualization of suspected pathology.  Images were performed without contrast.   FINDINGS:  No fractures identified.  The humeral head is subluxed superiorly 1.0 cm.  Marrow signal appears normal.  Mild acromioclavicular osteoarthritis is noted.  No AC joint effusion is noted.  A type 2 acromion is present.  There is a full-thickness tear of the supraspinatus tendon with 4.7 cm tendon retraction.  There is a full-thickness tear of the infra spinatus tendon with 3.9 cm tendon retraction.  The resulting gap measures 4.1 cm AP.  There is moderate to severe fatty atrophy of the supraspinatus and infra spinatus muscle bodies.  There is a partial thickness tear involving the superior fibers of the subscapularis tendon.  There is mild fatty atrophy of the teres minor muscle body.  No neural impinging lesion is identified within the quadrangular space.  The biceps long head tendon and its attachment to the superior labrum are intact.  Moderate intermediate signal within the tendon is consistent with tendinopathy.  The superior labrum demonstrates irregularity and abnormal signal likely representing a combination of degenerative changes and tearing.  A tear extends into the biceps tendon.  A small glenohumeral joint effusion is noted.  Grade 3-4 chondromalacia is noted along the superior aspect of the glenoid measuring up to 1.1 cm cranial caudal.  Osteoarthritic spurring is noted in the joint.  There is evidence of synovitis within the joint.      Impression:   1. Full-thickness tears of the supraspinatus and infra spinatus tendons with moderate to severe muscle body atrophy 2. Mild atrophy of the teres minor muscle body 3. Mild acromioclavicular and moderate glenohumeral osteoarthritis 4. Small  glenohumeral joint effusion with evidence of synovitis 5. Tear of the superior labrum with extension into the biceps tendon 6. Moderate biceps tendinopathy      Blade Steve M.D.       Electronically Signed and Approved By: Blade Steve M.D. on 9/25/2023 at 16:00             Mammo Screening Digital Tomosynthesis Bilateral With CAD    Result Date: 9/6/2023  Narrative: PROCEDURE: MAMMO SCREENING DIGITAL TOMOSYNTHESIS BILATERAL W CAD  COMPARISON: [facility], DIG SCREENING BILAT SARAI W 3D LASHONDA, 12/14/2020, 12:39.  [facility], DIG SCREENING BILAT SARAI W 3D LASHONDA, 11/11/2019, 11:44.  [facility], DIG DIAG RIGHT SARAI W 3D LASHONDA, 11/21/2018, 14:18.  [facility], [facility], MAMMO SCREENING DIGITAL TOMOSYNTHESIS BILATERAL W CAD, 3/18/2022, 16:44.  VIEWS:  BILATERAL CC AND MLO VIEWS WERE OBTAINED UTILIZING 3D TOMOSYNTHESIS AND R2 CAD SOFTWARE  INDICATIONS: screening mammogram  FINDINGS:  No suspicious mass, area of architectural distortion or suspicious microcalcification is identified.      Impression:  Benign mammogram. Suggest routine mammographic screening.  RECOMMENDATION(S):  ROUTINE MAMMOGRAM AND CLINICAL EVALUATION IN 12 MONTHS.   BIRADS:  DIAGNOSTIC CATEGORY 1--NEGATIVE.   BREAST COMPOSITION: Scattered areas fibroglandular density.  PLEASE NOTE:  A NORMAL MAMMOGRAM DOES NOT EXCLUDE THE POSSIBILITY OF BREAST CANCER. ANY CLINICALLY SUSPICIOUS PALPABLE LUMP SHOULD BE BIOPSIED.      SLOAN PEREZ MD       Electronically Signed and Approved By: SLOAN PEREZ MD on 9/06/2023 at 14:52                Result Review :   The following data was reviewed by: MIKE Laureano on 10/02/2023:  Data reviewed : Radiologic studies reviewed by me with the patient              Assessment and Plan    Diagnoses and all orders for this visit:    1. Tear of left rotator cuff, unspecified tear extent, unspecified whether traumatic (Primary)    2. Primary  osteoarthritis of left shoulder        Reviewed MRI with the patient discussed diagnosis and treatment options with her discussed risk benefits procedure recovery of left shoulder rotator cuff repair versus shoulder replacement, she would like to discuss possible surgical intervention with Dr. Lucas follow-up this Thursday to discuss surgery.    Call or return if worsening symptoms.    Follow Up   Return in about 3 days (around 10/5/2023) for Recheck.  Patient was given instructions and counseling regarding her condition or for health maintenance advice. Please see specific information pulled into the AVS if appropriate.

## 2023-10-05 ENCOUNTER — PREP FOR SURGERY (OUTPATIENT)
Dept: OTHER | Facility: HOSPITAL | Age: 73
End: 2023-10-05
Payer: MEDICARE

## 2023-10-05 ENCOUNTER — OFFICE VISIT (OUTPATIENT)
Dept: ORTHOPEDIC SURGERY | Facility: CLINIC | Age: 73
End: 2023-10-05
Payer: MEDICARE

## 2023-10-05 VITALS — OXYGEN SATURATION: 98 % | WEIGHT: 179 LBS | HEART RATE: 76 BPM | HEIGHT: 63 IN | BODY MASS INDEX: 31.71 KG/M2

## 2023-10-05 DIAGNOSIS — M19.012 PRIMARY OSTEOARTHRITIS OF LEFT SHOULDER: ICD-10-CM

## 2023-10-05 DIAGNOSIS — Z47.1 AFTERCARE FOLLOWING LEFT SHOULDER JOINT REPLACEMENT SURGERY: Primary | ICD-10-CM

## 2023-10-05 DIAGNOSIS — M75.102 TEAR OF LEFT ROTATOR CUFF, UNSPECIFIED TEAR EXTENT, UNSPECIFIED WHETHER TRAUMATIC: Primary | ICD-10-CM

## 2023-10-05 DIAGNOSIS — M75.102 TEAR OF LEFT ROTATOR CUFF, UNSPECIFIED TEAR EXTENT, UNSPECIFIED WHETHER TRAUMATIC: ICD-10-CM

## 2023-10-05 DIAGNOSIS — M12.812 ROTATOR CUFF ARTHROPATHY OF LEFT SHOULDER: Primary | ICD-10-CM

## 2023-10-05 DIAGNOSIS — Z96.612 AFTERCARE FOLLOWING LEFT SHOULDER JOINT REPLACEMENT SURGERY: Primary | ICD-10-CM

## 2023-10-05 RX ORDER — TRANEXAMIC ACID 10 MG/ML
1000 INJECTION, SOLUTION INTRAVENOUS ONCE
OUTPATIENT
Start: 2023-10-05 | End: 2023-10-05

## 2023-10-05 RX ORDER — CEFAZOLIN SODIUM 2 G/100ML
2 INJECTION, SOLUTION INTRAVENOUS ONCE
OUTPATIENT
Start: 2023-10-05 | End: 2023-10-05

## 2023-10-05 NOTE — PROGRESS NOTES
"Chief Complaint  Pain and Follow-up of the Left Shoulder     Subjective      Ada AME Dangelo presents to [facility] for follow up evaluation of the left shoulder. The patient has been treating her left shoulder rotator cuff tear and osteoarthritis conservatively. The patients recent MRI showed 2 large rotator cuff tears with muscle atrophy. She is ready to discuss operative treatment.     No Known Allergies     Social History     Socioeconomic History    Marital status: Single   Tobacco Use    Smoking status: Never    Smokeless tobacco: Never   Vaping Use    Vaping Use: Never used   Substance and Sexual Activity    Alcohol use: Never    Drug use: Never        I reviewed the patient's chief complaint, history of present illness, review of systems, past medical history, surgical history, family history, social history, medications, and allergy list.     Review of Systems     Constitutional: Denies fevers, chills, weight loss  Cardiovascular: Denies chest pain, shortness of breath  Skin: Denies rashes, acute skin changes  Neurologic: Denies headache, loss of consciousness  MSK: left shoulder pain      Vital Signs:   Pulse 76   Ht 160 cm (63\")   Wt 81.2 kg (179 lb)   SpO2 98%   BMI 31.71 kg/m²          Physical Exam  General: Alert. No acute distress    Ortho Exam      Left shoulder- skin intact, no swelling or atrophy, nontender to palpation, neuro intact, forward elevation 150, abduction 110 degrees, ER 70 degrees, IR 40 degrees, 4/5 supraspinatus, 5/5 infraspinatus and subscapularis, IR to T12, tenderness over lateral elbow     Procedures      Imaging Results (Most Recent)       None             Result Review :       MRI Shoulder Left Without Contrast    Result Date: 9/25/2023  Narrative: PROCEDURE: MRI SHOULDER LEFT WO CONTRAST  COMPARISON: [facility] XR SHOULDER 2+ VW LEFT, 8/29/2023, 17:19.  INDICATIONS: LEFT SHOULDER PAIN WITH PAINFUL RANGE OF MOTION WORSENING " SINCE FALL X 4WEEKS      TECHNIQUE: A variety of imaging planes and parameters were utilized for visualization of suspected pathology.  Images were performed without contrast.   FINDINGS:  No fractures identified.  The humeral head is subluxed superiorly 1.0 cm.  Marrow signal appears normal.  Mild acromioclavicular osteoarthritis is noted.  No AC joint effusion is noted.  A type 2 acromion is present.  There is a full-thickness tear of the supraspinatus tendon with 4.7 cm tendon retraction.  There is a full-thickness tear of the infra spinatus tendon with 3.9 cm tendon retraction.  The resulting gap measures 4.1 cm AP.  There is moderate to severe fatty atrophy of the supraspinatus and infra spinatus muscle bodies.  There is a partial thickness tear involving the superior fibers of the subscapularis tendon.  There is mild fatty atrophy of the teres minor muscle body.  No neural impinging lesion is identified within the quadrangular space.  The biceps long head tendon and its attachment to the superior labrum are intact.  Moderate intermediate signal within the tendon is consistent with tendinopathy.  The superior labrum demonstrates irregularity and abnormal signal likely representing a combination of degenerative changes and tearing.  A tear extends into the biceps tendon.  A small glenohumeral joint effusion is noted.  Grade 3-4 chondromalacia is noted along the superior aspect of the glenoid measuring up to 1.1 cm cranial caudal.  Osteoarthritic spurring is noted in the joint.  There is evidence of synovitis within the joint.      Impression:   1. Full-thickness tears of the supraspinatus and infra spinatus tendons with moderate to severe muscle body atrophy 2. Mild atrophy of the teres minor muscle body 3. Mild acromioclavicular and moderate glenohumeral osteoarthritis 4. Small glenohumeral joint effusion with evidence of synovitis 5. Tear of the superior labrum with extension into the biceps tendon 6. Moderate  biceps tendinopathy      Blade Steve M.D.       Electronically Signed and Approved By: Blade Steve M.D. on 9/25/2023 at 16:00             Mammo Screening Digital Tomosynthesis Bilateral With CAD    Result Date: 9/6/2023  Narrative: PROCEDURE: MAMMO SCREENING DIGITAL TOMOSYNTHESIS BILATERAL W CAD  COMPARISON: [facility], DIG SCREENING BILAT SARAI W 3D LASHONDA, 12/14/2020, 12:39.  [facility], DIG SCREENING BILAT SARAI W 3D LASHONDA, 11/11/2019, 11:44.  [facility], DIG DIAG RIGHT SARAI W 3D LASHONDA, 11/21/2018, 14:18.  [facility], MG, MAMMO SCREENING DIGITAL TOMOSYNTHESIS BILATERAL W CAD, 3/18/2022, 16:44.  VIEWS:  BILATERAL CC AND MLO VIEWS WERE OBTAINED UTILIZING 3D TOMOSYNTHESIS AND R2 CAD SOFTWARE  INDICATIONS: screening mammogram  FINDINGS:  No suspicious mass, area of architectural distortion or suspicious microcalcification is identified.      Impression:  Benign mammogram. Suggest routine mammographic screening.  RECOMMENDATION(S):  ROUTINE MAMMOGRAM AND CLINICAL EVALUATION IN 12 MONTHS.   BIRADS:  DIAGNOSTIC CATEGORY 1--NEGATIVE.   BREAST COMPOSITION: Scattered areas fibroglandular density.  PLEASE NOTE:  A NORMAL MAMMOGRAM DOES NOT EXCLUDE THE POSSIBILITY OF BREAST CANCER. ANY CLINICALLY SUSPICIOUS PALPABLE LUMP SHOULD BE BIOPSIED.      SLOAN PEREZ MD       Electronically Signed and Approved By: SLOAN PEREZ MD on 9/06/2023 at 14:52                     Assessment and Plan     Diagnoses and all orders for this visit:    1. Rotator cuff arthropathy of left shoulder (Primary)    2. Tear of left rotator cuff, unspecified tear extent, unspecified whether traumatic    3. Primary osteoarthritis of left shoulder        Discussed the treatment options with the patient, operative vs non-operative. Discussed the risks and benefits of a Reverse Total Shoulder Arthroplasty. The patient expressed understanding and wished to proceed.     Discussed  surgery., Risks/benefits discussed with patient including, but not limited to: infection, bleeding, neurovascular damage, malunion, nonunion, aesthetic deformity, need for further surgery, and death., Discussed with patient the implant type being used during surgery and patient understands., Surgery pamphlet given., and Call or return if worsening symptoms.    Follow Up     2 weeks postoperatively.        Patient was given instructions and counseling regarding her condition or for health maintenance advice. Please see specific information pulled into the AVS if appropriate.     Scribed for Arnie Lucas MD by Asiya Pleitez.  10/05/23   11:00 EDT    I have personally performed the services described in this document as scribed by the above individual and it is both accurate and complete. Arnie Lucas MD 10/06/23

## 2023-10-30 ENCOUNTER — ANESTHESIA EVENT (OUTPATIENT)
Dept: PERIOP | Facility: HOSPITAL | Age: 73
End: 2023-10-30
Payer: MEDICARE

## 2023-10-30 DIAGNOSIS — Z01.818 PRE-OP TESTING: Primary | ICD-10-CM

## 2023-11-01 ENCOUNTER — PRE-ADMISSION TESTING (OUTPATIENT)
Dept: PREADMISSION TESTING | Facility: HOSPITAL | Age: 73
End: 2023-11-01
Payer: MEDICARE

## 2023-11-01 VITALS
HEIGHT: 63 IN | RESPIRATION RATE: 16 BRPM | BODY MASS INDEX: 31.71 KG/M2 | WEIGHT: 179 LBS | SYSTOLIC BLOOD PRESSURE: 138 MMHG | TEMPERATURE: 97.2 F | DIASTOLIC BLOOD PRESSURE: 74 MMHG | OXYGEN SATURATION: 100 % | HEART RATE: 66 BPM

## 2023-11-01 DIAGNOSIS — Z01.818 PRE-OP TESTING: ICD-10-CM

## 2023-11-01 DIAGNOSIS — M75.102 TEAR OF LEFT ROTATOR CUFF, UNSPECIFIED TEAR EXTENT, UNSPECIFIED WHETHER TRAUMATIC: ICD-10-CM

## 2023-11-01 LAB
ALBUMIN SERPL-MCNC: 4.2 G/DL (ref 3.5–5.2)
ALBUMIN/GLOB SERPL: 1.3 G/DL
ALP SERPL-CCNC: 107 U/L (ref 39–117)
ALT SERPL W P-5'-P-CCNC: 17 U/L (ref 1–33)
ANION GAP SERPL CALCULATED.3IONS-SCNC: 10.2 MMOL/L (ref 5–15)
AST SERPL-CCNC: 19 U/L (ref 1–32)
BASOPHILS # BLD AUTO: 0.06 10*3/MM3 (ref 0–0.2)
BASOPHILS NFR BLD AUTO: 1 % (ref 0–1.5)
BILIRUB SERPL-MCNC: 0.9 MG/DL (ref 0–1.2)
BUN SERPL-MCNC: 12 MG/DL (ref 8–23)
BUN/CREAT SERPL: 18.5 (ref 7–25)
CALCIUM SPEC-SCNC: 9.7 MG/DL (ref 8.6–10.5)
CHLORIDE SERPL-SCNC: 100 MMOL/L (ref 98–107)
CO2 SERPL-SCNC: 27.8 MMOL/L (ref 22–29)
CREAT SERPL-MCNC: 0.65 MG/DL (ref 0.57–1)
DEPRECATED RDW RBC AUTO: 47.1 FL (ref 37–54)
EGFRCR SERPLBLD CKD-EPI 2021: 91.9 ML/MIN/1.73
EOSINOPHIL # BLD AUTO: 0.15 10*3/MM3 (ref 0–0.4)
EOSINOPHIL NFR BLD AUTO: 2.6 % (ref 0.3–6.2)
ERYTHROCYTE [DISTWIDTH] IN BLOOD BY AUTOMATED COUNT: 15.3 % (ref 12.3–15.4)
GLOBULIN UR ELPH-MCNC: 3.2 GM/DL
GLUCOSE SERPL-MCNC: 92 MG/DL (ref 65–99)
HBA1C MFR BLD: 6.3 % (ref 4.8–5.6)
HCT VFR BLD AUTO: 42.3 % (ref 34–46.6)
HGB BLD-MCNC: 13.5 G/DL (ref 12–15.9)
IMM GRANULOCYTES # BLD AUTO: 0.01 10*3/MM3 (ref 0–0.05)
IMM GRANULOCYTES NFR BLD AUTO: 0.2 % (ref 0–0.5)
INR PPP: 1.06 (ref 0.86–1.15)
LYMPHOCYTES # BLD AUTO: 2.39 10*3/MM3 (ref 0.7–3.1)
LYMPHOCYTES NFR BLD AUTO: 41.6 % (ref 19.6–45.3)
MCH RBC QN AUTO: 27.1 PG (ref 26.6–33)
MCHC RBC AUTO-ENTMCNC: 31.9 G/DL (ref 31.5–35.7)
MCV RBC AUTO: 84.8 FL (ref 79–97)
MONOCYTES # BLD AUTO: 0.37 10*3/MM3 (ref 0.1–0.9)
MONOCYTES NFR BLD AUTO: 6.4 % (ref 5–12)
NEUTROPHILS NFR BLD AUTO: 2.77 10*3/MM3 (ref 1.7–7)
NEUTROPHILS NFR BLD AUTO: 48.2 % (ref 42.7–76)
NRBC BLD AUTO-RTO: 0 /100 WBC (ref 0–0.2)
PLATELET # BLD AUTO: 270 10*3/MM3 (ref 140–450)
PMV BLD AUTO: 10 FL (ref 6–12)
POTASSIUM SERPL-SCNC: 4.2 MMOL/L (ref 3.5–5.2)
PROT SERPL-MCNC: 7.4 G/DL (ref 6–8.5)
PROTHROMBIN TIME: 13.9 SECONDS (ref 11.8–14.9)
RBC # BLD AUTO: 4.99 10*6/MM3 (ref 3.77–5.28)
SODIUM SERPL-SCNC: 138 MMOL/L (ref 136–145)
WBC NRBC COR # BLD: 5.75 10*3/MM3 (ref 3.4–10.8)

## 2023-11-01 PROCEDURE — 93005 ELECTROCARDIOGRAM TRACING: CPT

## 2023-11-01 PROCEDURE — 85610 PROTHROMBIN TIME: CPT

## 2023-11-01 PROCEDURE — 80053 COMPREHEN METABOLIC PANEL: CPT

## 2023-11-01 PROCEDURE — 85025 COMPLETE CBC W/AUTO DIFF WBC: CPT

## 2023-11-01 PROCEDURE — 83036 HEMOGLOBIN GLYCOSYLATED A1C: CPT

## 2023-11-01 PROCEDURE — 36415 COLL VENOUS BLD VENIPUNCTURE: CPT

## 2023-11-01 RX ORDER — SACCHAROMYCES BOULARDII 250 MG
250 CAPSULE ORAL AS NEEDED
COMMUNITY

## 2023-11-01 RX ORDER — VITAMIN B COMPLEX
1 CAPSULE ORAL DAILY
COMMUNITY

## 2023-11-01 NOTE — DISCHARGE INSTRUCTIONS
IMPORTANT INSTRUCTIONS - PRE-ADMISSION TESTING  DO NOT EAT OR CHEW anything after midnight the night before your procedure.    You may have CLEAR liquids up to ___3___ hours prior to ARRIVAL time.   Take the following medications the morning of your procedure with JUST A SIP OF WATER:        ALLEGRA, FLONASE, ZADIATOR EYE DROPS                                                                   *DAY OF SURGERY DO NOT TAKE LISINOPRIL-HCTZ BUT CAN TAKE UP UNTIL DAY OF SURGERY    DO NOT BRING your medications to the hospital with you, UNLESS something has changed since your PRE-Admission Testing appointment.  Hold all vitamins, supplements, and NSAIDS (Non- steroidal anti-inflammatory meds) for one week prior to surgery (you MAY take Tylenol or Acetaminophen). STOP 11-1-23             STOP: MELOXICAM, PROBIOTICS, VITAMIN B COMPLEX    If you are diabetic, check your blood sugar the morning of your procedure. If it is less than 70 or if you are feeling symptomatic, call the following number for further instructions: [phone]  Use your inhalers/nebulizers as usual, the morning of your procedure. BRING YOUR INHALERS with you.   Bring your CPAP or BIPAP to hospital, ONLY IF YOU WILL BE SPENDING THE NIGHT.   Make sure you have a ride home and have someone who will stay with you the day of your procedure after you go home.  If you have any questions, please call your Pre-Admission Testing Nurse, __KRISSY MALDONADO RN at [phone].   Per anesthesia request, do not smoke for 24 hours before your procedure or as instructed by your surgeon.      SURGERY 11-8-23 ELEVATOR A, THIRD FLOOR  WILL CALL YOU THE ARRIVAL TIME THE DAY PRIOR TO SURGEYR AFTER 1PM ON (11-7-23) SURGERY AREA # IF HAD QUESTIONS [phone]  DRINK 20 OZ GATORADE 3 HOURS PRIOR TO SURGERY ( NO RED)  USE SURGICAL SOAP 3 TIMES INSTRUCTED BELOW  FOLLOW ANY INSTRUCTIONS FROM SURGEON'S OFFICE  WEAR A BUTTON UP SHIRT FOR AFTER SURGERY WITH YOUR IMMOBILIZER/ COLD THERAPY  WRAP     PREOPERATIVE (BEFORE SURGERY)              BATHING INSTRUCTIONS  Instructions:    You will need to shower 3 separate times utilizing the soap provided; at the times indicated   below:     11-6-23 MONDAY PM   11-7-23 TUES AM  11-7-23 TUES PM     Wash your hair and face with normal shampoo and soap, rinse it well before using the surgical soap.      In the shower, wet the skin completely with water from your neck to your feet. Apply the cleanser to your   body ONLY FROM THE NECK TO YOUR FEET.     Do NOT USE THE CLEANSER ON YOUR FACE, HEAD, OR GENITAL (PRIVATE) AREAS.   Keep it out of your eyes, ears, and mouth because of the risk of injury to those areas.      Scrub with a clean washcloth for each bath utilizing the soap provided from the top of your body to the   bottom starting at the neck area.      Pay close attention to your armpits, groin area, and the site of surgery.      Wash your body gently for 5 minutes. Stand outside the stream or turn off the water while scrubbing your   body. Do NOT wash with your regular soap after the surgical cleanser is used.      RINSE THE CLEANSER OFF COMPLETELY with plenty of water. Rinse the area again thoroughly.      Dry off with a clean towel. The surgical soap can cause dryness; however do NOT APPLY LOTION,   CREAM, POWDER, and/or DEODORANT AFTER SHOWERING.     Be sure to where clean clothes after showering.      Ensure CLEAN BED LINENS AFTER FIRST wash with the surgical soap. - MONDAY PM     NO PETS ALLOWED IN THE BED with you after utilizing the surgical soap.    Clear Liquid Diet        Find out when you need to start a clear liquid diet.   Think of “clear liquids” as anything you could read a newspaper through. This includes things like water, broth, sports drinks, or tea WITHOUT any kind of milk or cream.           Once you are told to start a clear liquid diet, only drink these things until 3 hours before arrival to the hospital or when the hospital says to  stop. Total volume limitation: 8 oz.       Clear liquids you CAN drink:   Water   Clear broth: beef, chicken, vegetable, or bone broth with nothing in it   Gatorade   Lemonade or Julio-aid   Soda   Tea, coffee (NO cream or honey)   Jell-O (without fruit)   Popsicles (without fruit or cream)   Italian ices   Juice without pulp: apple, white, grape   You may use salt, pepper, and sugar  NO RED  NO NOODLES    Do NOT drink:   Milk or cream   Soy milk, almond milk, coconut milk, or other non-dairy drinks and   creamers   Milkshakes or smoothies   Tomato juice   Orange juice   Grapefruit juice   Cream soups or any other than broth         Clear Liquid Diet:  Do NOT eat any solid food.  Do NOT eat or suck on mints or candy.  Do NOT chew gum.  Do NOT drink thick liquids like milk or juice with pulp in it.  Do NOT add milk, cream, or anything like soy milk or almond milk to coffee or tea.

## 2023-11-01 NOTE — SIGNIFICANT NOTE
PT WOULD  LIKE TO DO HOME HEALTH IF POSSIBLE THEN DO [facility] IN [place], [place].  PT DAUGHTER CAN TRANSPORT TO AND FROM THERAPY    DME ASSISTANCE NEEDED FOR ANY EQUIPMENT NEEDED POSTOP

## 2023-11-02 LAB
QT INTERVAL: 409 MS
QTC INTERVAL: 418 MS

## 2023-11-08 ENCOUNTER — ANESTHESIA (OUTPATIENT)
Dept: PERIOP | Facility: HOSPITAL | Age: 73
End: 2023-11-08
Payer: MEDICARE

## 2023-11-08 ENCOUNTER — HOSPITAL ENCOUNTER (OUTPATIENT)
Facility: HOSPITAL | Age: 73
Discharge: HOME HEALTH | End: 2023-11-09
Attending: ORTHOPAEDIC SURGERY | Admitting: ORTHOPAEDIC SURGERY
Payer: MEDICARE

## 2023-11-08 ENCOUNTER — ANESTHESIA EVENT CONVERTED (OUTPATIENT)
Dept: ANESTHESIOLOGY | Facility: HOSPITAL | Age: 73
End: 2023-11-08
Payer: MEDICARE

## 2023-11-08 ENCOUNTER — APPOINTMENT (OUTPATIENT)
Dept: GENERAL RADIOLOGY | Facility: HOSPITAL | Age: 73
End: 2023-11-08
Payer: MEDICARE

## 2023-11-08 DIAGNOSIS — M75.102 TEAR OF LEFT ROTATOR CUFF, UNSPECIFIED TEAR EXTENT, UNSPECIFIED WHETHER TRAUMATIC: ICD-10-CM

## 2023-11-08 DIAGNOSIS — R26.2 DIFFICULTY IN WALKING: Primary | ICD-10-CM

## 2023-11-08 DIAGNOSIS — Z78.9 DECREASED ACTIVITIES OF DAILY LIVING (ADL): ICD-10-CM

## 2023-11-08 PROCEDURE — C1713 ANCHOR/SCREW BN/BN,TIS/BN: HCPCS | Performed by: ORTHOPAEDIC SURGERY

## 2023-11-08 PROCEDURE — C1776 JOINT DEVICE (IMPLANTABLE): HCPCS | Performed by: ORTHOPAEDIC SURGERY

## 2023-11-08 PROCEDURE — 25010000002 SUGAMMADEX 200 MG/2ML SOLUTION: Performed by: MARRIAGE & FAMILY THERAPIST

## 2023-11-08 PROCEDURE — 94799 UNLISTED PULMONARY SVC/PX: CPT

## 2023-11-08 PROCEDURE — 25810000003 LACTATED RINGERS PER 1000 ML: Performed by: ORTHOPAEDIC SURGERY

## 2023-11-08 PROCEDURE — 25810000003 LACTATED RINGERS PER 1000 ML: Performed by: ANESTHESIOLOGY

## 2023-11-08 PROCEDURE — 25010000002 PROPOFOL 10 MG/ML EMULSION: Performed by: NURSE ANESTHETIST, CERTIFIED REGISTERED

## 2023-11-08 PROCEDURE — 25010000002 KETOROLAC TROMETHAMINE PER 15 MG: Performed by: MARRIAGE & FAMILY THERAPIST

## 2023-11-08 PROCEDURE — 73020 X-RAY EXAM OF SHOULDER: CPT

## 2023-11-08 PROCEDURE — 25010000002 ROPIVACAINE PER 1 MG: Performed by: ANESTHESIOLOGY

## 2023-11-08 PROCEDURE — A9270 NON-COVERED ITEM OR SERVICE: HCPCS | Performed by: ANESTHESIOLOGY

## 2023-11-08 PROCEDURE — 23472 RECONSTRUCT SHOULDER JOINT: CPT | Performed by: ORTHOPAEDIC SURGERY

## 2023-11-08 PROCEDURE — 25010000002 CEFAZOLIN IN DEXTROSE 2-4 GM/100ML-% SOLUTION: Performed by: ORTHOPAEDIC SURGERY

## 2023-11-08 PROCEDURE — 63710000001 ACETAMINOPHEN EXTRA STRENGTH 500 MG TABLET: Performed by: ANESTHESIOLOGY

## 2023-11-08 PROCEDURE — 25010000002 CEFAZOLIN IN DEXTROSE 2000 MG/ 100 ML SOLUTION: Performed by: ORTHOPAEDIC SURGERY

## 2023-11-08 PROCEDURE — 25010000002 ONDANSETRON PER 1 MG: Performed by: MARRIAGE & FAMILY THERAPIST

## 2023-11-08 PROCEDURE — L3670 SO ACRO/CLAV CAN WEB PRE OTS: HCPCS | Performed by: ORTHOPAEDIC SURGERY

## 2023-11-08 PROCEDURE — 25010000002 FENTANYL CITRATE (PF) 50 MCG/ML SOLUTION: Performed by: NURSE ANESTHETIST, CERTIFIED REGISTERED

## 2023-11-08 PROCEDURE — 63710000001 CELECOXIB 100 MG CAPSULE: Performed by: ANESTHESIOLOGY

## 2023-11-08 PROCEDURE — 94761 N-INVAS EAR/PLS OXIMETRY MLT: CPT

## 2023-11-08 PROCEDURE — 25010000002 DEXAMETHASONE PER 1 MG: Performed by: NURSE ANESTHETIST, CERTIFIED REGISTERED

## 2023-11-08 PROCEDURE — 23472 RECONSTRUCT SHOULDER JOINT: CPT | Performed by: PHYSICIAN ASSISTANT

## 2023-11-08 PROCEDURE — 99203 OFFICE O/P NEW LOW 30 MIN: CPT | Performed by: STUDENT IN AN ORGANIZED HEALTH CARE EDUCATION/TRAINING PROGRAM

## 2023-11-08 DEVICE — SCRW COMPRNSV CNTRL 6.5X25MM REUS: Type: IMPLANTABLE DEVICE | Site: SHOULDER | Status: FUNCTIONAL

## 2023-11-08 DEVICE — TOTL SHLDER REV: Type: IMPLANTABLE DEVICE | Site: SHOULDER | Status: FUNCTIONAL

## 2023-11-08 DEVICE — GLENOSPHERE VERSA DIAL FIX STD 36MM: Type: IMPLANTABLE DEVICE | Site: SHOULDER | Status: FUNCTIONAL

## 2023-11-08 DEVICE — BEAR HUM PROLNG STD 36MM: Type: IMPLANTABLE DEVICE | Site: SHOULDER | Status: FUNCTIONAL

## 2023-11-08 DEVICE — SCRW FIX LK HEX 4.75X25MM: Type: IMPLANTABLE DEVICE | Site: SHOULDER | Status: FUNCTIONAL

## 2023-11-08 DEVICE — BASEPLT GLEN COMPR MINI W TPR ADAPTR 25: Type: IMPLANTABLE DEVICE | Site: SHOULDER | Status: FUNCTIONAL

## 2023-11-08 DEVICE — SUT FW #2 W/TPR NDL 1/2 CIR 38IN 97CM 26.5MM BLU: Type: IMPLANTABLE DEVICE | Site: SHOULDER | Status: FUNCTIONAL

## 2023-11-08 DEVICE — SCRW FIX LK HEX 4.75X30MM: Type: IMPLANTABLE DEVICE | Site: SHOULDER | Status: FUNCTIONAL

## 2023-11-08 DEVICE — TRY HUM/SHLDR COMPREHENSIVE/REVERSE MINI COCR STD 40MM: Type: IMPLANTABLE DEVICE | Site: SHOULDER | Status: FUNCTIONAL

## 2023-11-08 DEVICE — STEM HUM/SHLDR COMPREHENSIVE MINI 7X83MM: Type: IMPLANTABLE DEVICE | Site: SHOULDER | Status: FUNCTIONAL

## 2023-11-08 DEVICE — SCRW FIX LK HEX 4.75X15MM: Type: IMPLANTABLE DEVICE | Site: SHOULDER | Status: FUNCTIONAL

## 2023-11-08 RX ORDER — OXYCODONE AND ACETAMINOPHEN 7.5; 325 MG/1; MG/1
2 TABLET ORAL EVERY 4 HOURS PRN
Status: DISCONTINUED | OUTPATIENT
Start: 2023-11-08 | End: 2023-11-09 | Stop reason: HOSPADM

## 2023-11-08 RX ORDER — ROPIVACAINE HYDROCHLORIDE 5 MG/ML
INJECTION, SOLUTION EPIDURAL; INFILTRATION; PERINEURAL
Status: COMPLETED | OUTPATIENT
Start: 2023-11-08 | End: 2023-11-08

## 2023-11-08 RX ORDER — SODIUM CHLORIDE 9 MG/ML
40 INJECTION, SOLUTION INTRAVENOUS AS NEEDED
Status: DISCONTINUED | OUTPATIENT
Start: 2023-11-08 | End: 2023-11-09 | Stop reason: HOSPADM

## 2023-11-08 RX ORDER — ONDANSETRON 2 MG/ML
INJECTION INTRAMUSCULAR; INTRAVENOUS AS NEEDED
Status: DISCONTINUED | OUTPATIENT
Start: 2023-11-08 | End: 2023-11-08 | Stop reason: SURG

## 2023-11-08 RX ORDER — OXYCODONE AND ACETAMINOPHEN 7.5; 325 MG/1; MG/1
1 TABLET ORAL EVERY 4 HOURS PRN
Status: DISCONTINUED | OUTPATIENT
Start: 2023-11-08 | End: 2023-11-09 | Stop reason: HOSPADM

## 2023-11-08 RX ORDER — SODIUM CHLORIDE 9 MG/ML
40 INJECTION, SOLUTION INTRAVENOUS AS NEEDED
Status: DISCONTINUED | OUTPATIENT
Start: 2023-11-08 | End: 2023-11-08 | Stop reason: HOSPADM

## 2023-11-08 RX ORDER — DEXAMETHASONE SODIUM PHOSPHATE 4 MG/ML
INJECTION, SOLUTION INTRA-ARTICULAR; INTRALESIONAL; INTRAMUSCULAR; INTRAVENOUS; SOFT TISSUE AS NEEDED
Status: DISCONTINUED | OUTPATIENT
Start: 2023-11-08 | End: 2023-11-08 | Stop reason: SURG

## 2023-11-08 RX ORDER — MIDAZOLAM HYDROCHLORIDE 2 MG/2ML
2 INJECTION, SOLUTION INTRAMUSCULAR; INTRAVENOUS ONCE
Status: DISCONTINUED | OUTPATIENT
Start: 2023-11-08 | End: 2023-11-08 | Stop reason: HOSPADM

## 2023-11-08 RX ORDER — SODIUM CHLORIDE 0.9 % (FLUSH) 0.9 %
3 SYRINGE (ML) INJECTION EVERY 12 HOURS SCHEDULED
Status: DISCONTINUED | OUTPATIENT
Start: 2023-11-08 | End: 2023-11-09 | Stop reason: HOSPADM

## 2023-11-08 RX ORDER — ACETAMINOPHEN 325 MG/1
325 TABLET ORAL EVERY 4 HOURS PRN
Status: DISCONTINUED | OUTPATIENT
Start: 2023-11-08 | End: 2023-11-09 | Stop reason: HOSPADM

## 2023-11-08 RX ORDER — ACETAMINOPHEN 325 MG/1
650 TABLET ORAL EVERY 4 HOURS PRN
Status: DISCONTINUED | OUTPATIENT
Start: 2023-11-08 | End: 2023-11-09 | Stop reason: HOSPADM

## 2023-11-08 RX ORDER — PROMETHAZINE HYDROCHLORIDE 25 MG/1
25 SUPPOSITORY RECTAL ONCE AS NEEDED
Status: DISCONTINUED | OUTPATIENT
Start: 2023-11-08 | End: 2023-11-08 | Stop reason: HOSPADM

## 2023-11-08 RX ORDER — MEPERIDINE HYDROCHLORIDE 25 MG/ML
12.5 INJECTION INTRAMUSCULAR; INTRAVENOUS; SUBCUTANEOUS
Status: DISCONTINUED | OUTPATIENT
Start: 2023-11-08 | End: 2023-11-08 | Stop reason: HOSPADM

## 2023-11-08 RX ORDER — PROPOFOL 10 MG/ML
VIAL (ML) INTRAVENOUS AS NEEDED
Status: DISCONTINUED | OUTPATIENT
Start: 2023-11-08 | End: 2023-11-08 | Stop reason: SURG

## 2023-11-08 RX ORDER — NALOXONE HCL 0.4 MG/ML
0.4 VIAL (ML) INJECTION
Status: DISCONTINUED | OUTPATIENT
Start: 2023-11-08 | End: 2023-11-09 | Stop reason: HOSPADM

## 2023-11-08 RX ORDER — FAMOTIDINE 20 MG/1
40 TABLET, FILM COATED ORAL DAILY
Status: DISCONTINUED | OUTPATIENT
Start: 2023-11-08 | End: 2023-11-09 | Stop reason: HOSPADM

## 2023-11-08 RX ORDER — LIDOCAINE HYDROCHLORIDE 20 MG/ML
INJECTION, SOLUTION EPIDURAL; INFILTRATION; INTRACAUDAL; PERINEURAL AS NEEDED
Status: DISCONTINUED | OUTPATIENT
Start: 2023-11-08 | End: 2023-11-08 | Stop reason: SURG

## 2023-11-08 RX ORDER — SODIUM CHLORIDE, SODIUM LACTATE, POTASSIUM CHLORIDE, CALCIUM CHLORIDE 600; 310; 30; 20 MG/100ML; MG/100ML; MG/100ML; MG/100ML
100 INJECTION, SOLUTION INTRAVENOUS CONTINUOUS
Status: DISCONTINUED | OUTPATIENT
Start: 2023-11-08 | End: 2023-11-09 | Stop reason: HOSPADM

## 2023-11-08 RX ORDER — TRANEXAMIC ACID 10 MG/ML
1000 INJECTION, SOLUTION INTRAVENOUS ONCE
Status: COMPLETED | OUTPATIENT
Start: 2023-11-08 | End: 2023-11-08

## 2023-11-08 RX ORDER — ACETAMINOPHEN 500 MG
1000 TABLET ORAL ONCE
Status: COMPLETED | OUTPATIENT
Start: 2023-11-08 | End: 2023-11-08

## 2023-11-08 RX ORDER — FERROUS SULFATE 325(65) MG
325 TABLET ORAL
Status: DISCONTINUED | OUTPATIENT
Start: 2023-11-09 | End: 2023-11-09 | Stop reason: HOSPADM

## 2023-11-08 RX ORDER — SODIUM CHLORIDE 0.9 % (FLUSH) 0.9 %
10 SYRINGE (ML) INJECTION AS NEEDED
Status: DISCONTINUED | OUTPATIENT
Start: 2023-11-08 | End: 2023-11-08 | Stop reason: HOSPADM

## 2023-11-08 RX ORDER — ACETAMINOPHEN 650 MG/1
650 SUPPOSITORY RECTAL EVERY 4 HOURS PRN
Status: DISCONTINUED | OUTPATIENT
Start: 2023-11-08 | End: 2023-11-09 | Stop reason: HOSPADM

## 2023-11-08 RX ORDER — SODIUM CHLORIDE 0.9 % (FLUSH) 0.9 %
10 SYRINGE (ML) INJECTION AS NEEDED
Status: DISCONTINUED | OUTPATIENT
Start: 2023-11-08 | End: 2023-11-09 | Stop reason: HOSPADM

## 2023-11-08 RX ORDER — ENOXAPARIN SODIUM 100 MG/ML
40 INJECTION SUBCUTANEOUS DAILY
Status: DISCONTINUED | OUTPATIENT
Start: 2023-11-09 | End: 2023-11-09 | Stop reason: HOSPADM

## 2023-11-08 RX ORDER — PROMETHAZINE HYDROCHLORIDE 12.5 MG/1
25 TABLET ORAL ONCE AS NEEDED
Status: DISCONTINUED | OUTPATIENT
Start: 2023-11-08 | End: 2023-11-08 | Stop reason: HOSPADM

## 2023-11-08 RX ORDER — CEFAZOLIN SODIUM 2 G/100ML
2 INJECTION, SOLUTION INTRAVENOUS ONCE
Status: COMPLETED | OUTPATIENT
Start: 2023-11-08 | End: 2023-11-08

## 2023-11-08 RX ORDER — ONDANSETRON 2 MG/ML
4 INJECTION INTRAMUSCULAR; INTRAVENOUS ONCE AS NEEDED
Status: DISCONTINUED | OUTPATIENT
Start: 2023-11-08 | End: 2023-11-08 | Stop reason: HOSPADM

## 2023-11-08 RX ORDER — PROMETHAZINE HYDROCHLORIDE 12.5 MG/1
12.5 SUPPOSITORY RECTAL EVERY 6 HOURS PRN
Status: DISCONTINUED | OUTPATIENT
Start: 2023-11-08 | End: 2023-11-09 | Stop reason: HOSPADM

## 2023-11-08 RX ORDER — KETOROLAC TROMETHAMINE 30 MG/ML
INJECTION, SOLUTION INTRAMUSCULAR; INTRAVENOUS AS NEEDED
Status: DISCONTINUED | OUTPATIENT
Start: 2023-11-08 | End: 2023-11-08 | Stop reason: SURG

## 2023-11-08 RX ORDER — TRANEXAMIC ACID 10 MG/ML
1000 INJECTION, SOLUTION INTRAVENOUS ONCE
Status: DISCONTINUED | OUTPATIENT
Start: 2023-11-08 | End: 2023-11-08 | Stop reason: HOSPADM

## 2023-11-08 RX ORDER — PROMETHAZINE HYDROCHLORIDE 12.5 MG/1
12.5 TABLET ORAL EVERY 6 HOURS PRN
Status: DISCONTINUED | OUTPATIENT
Start: 2023-11-08 | End: 2023-11-09 | Stop reason: HOSPADM

## 2023-11-08 RX ORDER — ROCURONIUM BROMIDE 10 MG/ML
INJECTION, SOLUTION INTRAVENOUS AS NEEDED
Status: DISCONTINUED | OUTPATIENT
Start: 2023-11-08 | End: 2023-11-08 | Stop reason: SURG

## 2023-11-08 RX ORDER — DOCUSATE SODIUM 100 MG/1
100 CAPSULE, LIQUID FILLED ORAL 2 TIMES DAILY PRN
Status: DISCONTINUED | OUTPATIENT
Start: 2023-11-08 | End: 2023-11-09 | Stop reason: HOSPADM

## 2023-11-08 RX ORDER — CELECOXIB 100 MG/1
200 CAPSULE ORAL ONCE
Status: COMPLETED | OUTPATIENT
Start: 2023-11-08 | End: 2023-11-08

## 2023-11-08 RX ORDER — OXYCODONE HYDROCHLORIDE 5 MG/1
5 TABLET ORAL
Status: DISCONTINUED | OUTPATIENT
Start: 2023-11-08 | End: 2023-11-08 | Stop reason: HOSPADM

## 2023-11-08 RX ORDER — FENTANYL CITRATE 50 UG/ML
INJECTION, SOLUTION INTRAMUSCULAR; INTRAVENOUS AS NEEDED
Status: DISCONTINUED | OUTPATIENT
Start: 2023-11-08 | End: 2023-11-08 | Stop reason: SURG

## 2023-11-08 RX ORDER — MAGNESIUM HYDROXIDE 1200 MG/15ML
LIQUID ORAL AS NEEDED
Status: DISCONTINUED | OUTPATIENT
Start: 2023-11-08 | End: 2023-11-08 | Stop reason: HOSPADM

## 2023-11-08 RX ORDER — CEFAZOLIN SODIUM 2 G/100ML
2 INJECTION, SOLUTION INTRAVENOUS EVERY 8 HOURS
Qty: 200 ML | Refills: 0 | Status: COMPLETED | OUTPATIENT
Start: 2023-11-08 | End: 2023-11-09

## 2023-11-08 RX ORDER — SODIUM CHLORIDE, SODIUM LACTATE, POTASSIUM CHLORIDE, CALCIUM CHLORIDE 600; 310; 30; 20 MG/100ML; MG/100ML; MG/100ML; MG/100ML
9 INJECTION, SOLUTION INTRAVENOUS CONTINUOUS PRN
Status: DISCONTINUED | OUTPATIENT
Start: 2023-11-08 | End: 2023-11-09 | Stop reason: HOSPADM

## 2023-11-08 RX ADMIN — SODIUM CHLORIDE, POTASSIUM CHLORIDE, SODIUM LACTATE AND CALCIUM CHLORIDE 100 ML/HR: 600; 310; 30; 20 INJECTION, SOLUTION INTRAVENOUS at 22:34

## 2023-11-08 RX ADMIN — CEFAZOLIN SODIUM 2 G: 2 INJECTION, SOLUTION INTRAVENOUS at 22:29

## 2023-11-08 RX ADMIN — ROCURONIUM BROMIDE 50 MG: 50 INJECTION INTRAVENOUS at 14:39

## 2023-11-08 RX ADMIN — CEFAZOLIN SODIUM 2 G: 2 INJECTION, SOLUTION INTRAVENOUS at 14:41

## 2023-11-08 RX ADMIN — SODIUM CHLORIDE, POTASSIUM CHLORIDE, SODIUM LACTATE AND CALCIUM CHLORIDE: 600; 310; 30; 20 INJECTION, SOLUTION INTRAVENOUS at 15:53

## 2023-11-08 RX ADMIN — TRANEXAMIC ACID 1000 MG: 10 INJECTION, SOLUTION INTRAVENOUS at 13:47

## 2023-11-08 RX ADMIN — ROPIVACAINE HYDROCHLORIDE 25 ML: 5 INJECTION EPIDURAL; INFILTRATION; PERINEURAL at 14:14

## 2023-11-08 RX ADMIN — DEXAMETHASONE SODIUM PHOSPHATE 4 MG: 4 INJECTION, SOLUTION INTRAMUSCULAR; INTRAVENOUS at 14:51

## 2023-11-08 RX ADMIN — ONDANSETRON 4 MG: 2 INJECTION INTRAMUSCULAR; INTRAVENOUS at 16:00

## 2023-11-08 RX ADMIN — LIDOCAINE HYDROCHLORIDE 40 MG: 20 INJECTION, SOLUTION EPIDURAL; INFILTRATION; INTRACAUDAL; PERINEURAL at 14:39

## 2023-11-08 RX ADMIN — ACETAMINOPHEN 1000 MG: 500 TABLET ORAL at 13:45

## 2023-11-08 RX ADMIN — SODIUM CHLORIDE, POTASSIUM CHLORIDE, SODIUM LACTATE AND CALCIUM CHLORIDE 9 ML/HR: 600; 310; 30; 20 INJECTION, SOLUTION INTRAVENOUS at 13:40

## 2023-11-08 RX ADMIN — FENTANYL CITRATE 100 MCG: 50 INJECTION, SOLUTION INTRAMUSCULAR; INTRAVENOUS at 14:39

## 2023-11-08 RX ADMIN — PROPOFOL 150 MG: 10 INJECTION, EMULSION INTRAVENOUS at 14:39

## 2023-11-08 RX ADMIN — SUGAMMADEX 200 MG: 100 INJECTION, SOLUTION INTRAVENOUS at 16:00

## 2023-11-08 RX ADMIN — CELECOXIB 200 MG: 100 CAPSULE ORAL at 13:45

## 2023-11-08 RX ADMIN — KETOROLAC TROMETHAMINE 15 MG: 30 INJECTION, SOLUTION INTRAMUSCULAR; INTRAVENOUS at 16:00

## 2023-11-08 NOTE — PLAN OF CARE
Goal Outcome Evaluation:      Patient is post op total shoulder today. Patient ambulated to the chair with standby assistance. Vital signs stable. Pain well controlled. Immobilizer in place on the left arm. Alert and oriented. Demonstrates proper use of the call light.

## 2023-11-08 NOTE — ANESTHESIA POSTPROCEDURE EVALUATION
Patient: Simi Dangelo    Procedure Summary       Date: 11/08/23 Room / Location: [facility] OR 06 / [facility] MAIN OR    Anesthesia Start: 1435 Anesthesia Stop: 1612    Procedure: LEFT TOTAL SHOULDER REVERSE ARTHROPLASTY. (Left: Shoulder) Diagnosis:       Tear of left rotator cuff, unspecified tear extent, unspecified whether traumatic      (Tear of left rotator cuff, unspecified tear extent, unspecified whether traumatic [M75.102])    Surgeons: Arnie Lucas MD Provider: Renan Buchanan MD    Anesthesia Type: general, general with block ASA Status: 2            Anesthesia Type: general, general with block    Vitals  Vitals Value Taken Time   /73 11/08/23 1620   Temp 35.9 °C (96.7 °F) 11/08/23 1610   Pulse 82 11/08/23 1621   Resp 15 11/08/23 1620   SpO2 96 % 11/08/23 1621   Vitals shown include unfiled device data.        Post Anesthesia Care and Evaluation    Patient location during evaluation: bedside  Patient participation: complete - patient participated  Level of consciousness: awake  Pain score: 0  Pain management: adequate    Airway patency: patent  PONV Status: none  Cardiovascular status: acceptable and stable  Respiratory status: acceptable  Hydration status: acceptable    Comments: An Anesthesiologist personally participated in the most demanding procedures (including induction and emergence if applicable) in the anesthesia plan, monitored the course of anesthesia administration at frequent intervals and remained physically present and available for immediate diagnosis and treatment of emergencies.

## 2023-11-08 NOTE — ANESTHESIA PREPROCEDURE EVALUATION
Anesthesia Evaluation     Patient summary reviewed and Nursing notes reviewed   no history of anesthetic complications:   NPO Solid Status: > 8 hours  NPO Liquid Status: > 2 hours           Airway   Mallampati: III  TM distance: >3 FB  Neck ROM: full  No difficulty expected  Dental      Pulmonary - negative pulmonary ROS and normal exam    breath sounds clear to auscultation  Cardiovascular - normal exam  Exercise tolerance: good (4-7 METS)    Rhythm: regular  Rate: normal    (+) hypertension, hyperlipidemia      Neuro/Psych- negative ROS  GI/Hepatic/Renal/Endo - negative ROS     Musculoskeletal     Abdominal    Substance History - negative use     OB/GYN negative ob/gyn ROS         Other   arthritis,     ROS/Med Hx Other: >4METS, HX HTN,HLD,PREV. TKR. TJR. KT               Anesthesia Plan    ASA 2     general and general with block     (Patient understands anesthesia not responsible for dental damage.)  intravenous induction     Anesthetic plan, risks, benefits, and alternatives have been provided, discussed and informed consent has been obtained with: patient.    Use of blood products discussed with patient .    Plan discussed with CRNA.    CODE STATUS:

## 2023-11-08 NOTE — ANESTHESIA PROCEDURE NOTES
Peripheral Block      Patient reassessed immediately prior to procedure    Patient location during procedure: pre-op  Start time: 11/8/2023 2:11 PM  Stop time: 11/8/2023 2:15 PM  Reason for block: at surgeon's request and post-op pain management  Performed by  Anesthesiologist: Sugar Ocasio MD  Preanesthetic Checklist  Completed: patient identified, IV checked, site marked, risks and benefits discussed, surgical consent, monitors and equipment checked, pre-op evaluation and timeout performed  Prep:  Pt Position: supine (HOB elevated)  Sterile barriers:cap, washed/disinfected hands, sterile barriers, gloves, mask, partial drape and alcohol skin prep  Prep: ChloraPrep  Patient monitoring: blood pressure monitoring, continuous pulse oximetry and EKG  Procedure    Sedation: yes  Performed under: local infiltration  Guidance:ultrasound guided    ULTRASOUND INTERPRETATION.  Using ultrasound guidance a 22 G gauge needle was placed in close proximity to the brachial plexus nerve, at which point, under ultrasound guidance anesthetic was injected in the area of the nerve and spread of the anesthesia was seen on ultrasound in close proximity thereto.  There were no abnormalities seen on ultrasound; a digital image was taken; and the patient tolerated the procedure with no complications. Images:still images obtained, printed/placed on chart    Laterality:left  Block Type:interscalene  Injection Technique:single-shot  Needle Type:echogenic  Needle Gauge:22 G (2in)  Resistance on Injection: none    Medications Used: ropivacaine (NAROPIN) 0.5 % injection - Injection   25 mL - 11/8/2023 2:14:00 PM      Medications  Comment:With epi 1:200,000    Post Assessment  Injection Assessment: negative aspiration for heme, no paresthesia on injection and incremental injection  Patient Tolerance:comfortable throughout block  Complications:no  Additional Notes  The block or continuous infusion is requested by the referring physician for  management of postoperative pain, or pain related to a procedure. Ultrasound guidance (deemed medically necessary). Painless injection, pt was awake and conversant during the procedure without complications. Needle and surrounding structures visualized throughout procedure. No adverse reactions or complications seen during this period. Post-procedure image showed no signs of complication, and anatomy was consistent with an uncomplicated nerve blockade.

## 2023-11-08 NOTE — OP NOTE
TOTAL SHOULDER REVERSE ARTHROPLASTY  Procedure Report    Patient Name:  Simi Dangelo  YOB: 1950    Date of Surgery:  11/8/2023     Indications:  Patient has advanced shoulder arthritis a and a large rotator cuff tear nd has failed conservative treatment and wishes to undergo operative treatment. Risks and benefits of operative treatment including bleeding, infection, damage to neurovascular structures, continued pain and disability, need for additional procedures, among others. Informed consent was obtained and they wished to proceed.    Pre-op Diagnosis:   Tear of left rotator cuff, unspecified tear extent, unspecified whether traumatic [M75.102]       Post-Op Diagnosis Codes:     * Tear of left rotator cuff, unspecified tear extent, unspecified whether traumatic [M75.102]  Left shoulder osteoarthritis    Procedure/CPT® Codes:      Procedure(s):  LEFT TOTAL SHOULDER REVERSE ARTHROPLASTY.    Staff:  Surgeon(s):  Arnie Lucas MD    Assistant: Ed Johnson PA; Alfa Cook RN    Anesthesia: General with Block    Estimated Blood Loss: 100ml    Implants:    Implant Name Type Inv. Item Serial No.  Lot No. LRB No. Used Action   SUT FW #2 W/TPR NDL 1/2 CIR 38IN 97CM 26.5MM JASON - [id number] Implant SUT FW #2 W/TPR NDL 1/2 CIR 38IN 97CM 26.5MM JASON  [id number] Left 1 Implanted   SCRW FIX LK HEX 4.55P33SQ - [id number] Implant SCRW FIX LK HEX 4.99I52TJ  [employer] [id number] Left 2 Implanted   BASEPLT SATISH COMPR MINI W TPR ADAPTR 25 - [id number] Implant BASEPLT SATISH COMPR MINI W TPR ADAPTR 25  [employer] [id number] Left 1 Implanted   GLENOSPHERE VERSA DIAL FIX STD 36MM - [id number] Implant GLENOSPHERE VERSA DIAL FIX STD 36MM  [employer] [id number] Left 1 Implanted   SCRW FIX LK HEX 4.84Y50YM - [id number] Implant SCRW FIX LK HEX 4.48V07SV  [employer] [id number] Left 1 Implanted   SCRW COMPRNSV CNTRL 6.5X25MM REUS - [id number] Implant SCRW COMPRNSV CNTRL 6.5X25MM REUS   [employer] [id number] Left 1 Implanted   SCRW FIX LK HEX 4.98O52FZ - [id number] Implant SCRW FIX LK HEX 4.10B31EO  [employer] [id number] Left 1 Implanted   TRY HUM/SHLDR COMPREHENSIVE/REVERSE MINI COCR STD 40MM - [id number] Implant TRY HUM/SHLDR COMPREHENSIVE/REVERSE MINI COCR STD 40MM  [employer] [id number] Left 1 Implanted   BEAR HUM PROLNG STD 36MM - [id number] Implant BEAR HUM PROLNG STD 36MM  [employer] [id number] Left 1 Implanted   STEM HUM/SHLDR COMPREHENSIVE MINI 7X83MM - [id number] Implant STEM HUM/SHLDR COMPREHENSIVE MINI 7X83MM  [employer] [id number] Left 1 Implanted       Specimen:          None        Findings: shoulder arthritis, massive rotator cuff tear    Complications: None    Description of Procedure: The operative site was marked in preoperative holding area.  The patient received a preoperative interscalene nerve block by anesthesia.  The patient was brought the operating room where general anesthesia was applied.  The patient was placed in a modified beach chair position.  The patient's head was secured and bony prominences were padded.  SCD boots were placed on the lower extremities.  The patient's unaffected arm was placed in an arm bourgeois.  The operative arm was prepped and draped in usual sterile fashion.  Preoperative antibiotic was given.  Tranexamic acid was given intravenously at the beginning and end of the procedure.  Formal timeout was held.  An anterior incision was made over the  deltopectoral interval.  The cephalic vein was retracted medially.  The clavipectoral fascia was incised.  There was a massive rotator cuff tear involving the subscapularis as well as the supra and infraspinatus.  The biceps tendon was thickened and there was fluid around the joint and the biceps tendon.  The tendon was tenodesed to the upper border of the pectoralis major with #2 FiberWire in a figure-of-eight suture fashion.  The arm was externally rotated as the capsule was released off the humeral  head.  The shoulder was dislocated and we began reaming the humeral canal.  Reaming up to a size 7 where there was good cortical contact.  We then made our proximal humeral cut at 20 degrees retroversion using the guide.  We then broached up to a size 7 where there was good axial and rotational stability.  This point the broach was removed and the arm was positioned in flexion and external rotation to visualize the glenoid.  The biceps tendon and labrum was excised.  Guidepin was placed at the center of the glenoid and the glenoid was reamed and drilled.  The mini baseplate was placed and secured with a central nonlocking screw.  Peripheral locking screws were placed and an offset glenosphere with the offset inferiorly was placed.  The glenosphere was impacted into position and was secured.    At this point the proximal humerus was exposed.  The broach was replaced and was trialed.  The trial had excellent stability and range of motion.  Therefore the appropriate size humeral stem was placed and the tray and insert were assembled and placed onto the humeral stem.  The shoulder was reduced and stability was unchanged.  The wound was then irrigated with irrisept and saline.  The deltopectoral interval was closed with #1 Vicryl in figure-of-eight fashion and the subcutaneous tissues were closed with 2-0 Vicryl.  The skin was reapproximated with staples and an Aquacel dressing was placed.  Cold therapy and immobilizer were placed.  Patient awoke from anesthesia in stable condition.  There were no complications.  All counts were correct and patient was stable to recovery.    Assistant: Ed Johnson PA; Alfa Cook RN  was responsible for performing the following activities: Retraction, Suction, Irrigation, and Placing Dressing and their skilled assistance was necessary for the success of this case.    SURGICAL APPROACH: Deltopectoral      SURGICAL TECHNIQUE: Peel off        Arnie Lucas MD      Date: 11/8/2023  Time: 15:59 EST

## 2023-11-08 NOTE — H&P
[facility]   HISTORY AND PHYSICAL    Patient Name: Simi Dangelo  : 1950  MRN: [id number]  Primary Care Physician:  Shanika Crawford MD  Date of admission: (Not on file)    Subjective   Subjective     Chief Complaint: Left shoulder pain    History of Present Illness: The patient is a 73-year-old female with left shoulder pain.  She has failed conservative measures and wishes to undergo operative treatment.  She reports worsening shoulder pain and weakness.  The symptoms are worse with activity but also present at rest and at night.  She has tried nonoperative management without relief.    Review of Systems : Negative except for those mentioned in the history of present illness    Personal History     Past Medical History:   Diagnosis Date    Allergy, unspecified, subsequent encounter     Bilateral primary osteoarthritis of knee     Decreased white blood cell count, unspecified     Essential (primary) hypertension     Full incontinence of feces     Hemorrhoids     History of total right knee replacement (TKR)     Hypercholesteremia     Hypertension     Left shoulder pain     Morbid (severe) obesity due to excess calories     Overweight     Pain involving joints of fingers of both hands     Presence of unspecified artificial knee joint     Pure hypercholesterolemia     Residual hemorrhoidal skin tag     Varicose veins of bilateral lower extremities with pain        Past Surgical History:   Procedure Laterality Date    APPENDECTOMY      HYSTERECTOMY      with left oophrectomy    JOINT REPLACEMENT Right     TKR    OVARIAN CYST REMOVAL      TONSILLECTOMY AND ADENOIDECTOMY      TUBAL ABDOMINAL LIGATION      TUMOR REMOVAL Left , ,     Left thumb       Family History: family history includes Pulmonary embolism in her brother. Otherwise pertinent FHx was reviewed and not pertinent to current issue.    Social History:  reports that she has never smoked. She has never used smokeless tobacco. She  reports that she does not drink alcohol and does not use drugs.    Home Medications:  atorvastatin, fexofenadine, fluticasone, ketotifen, lisinopril-hydrochlorothiazide, meloxicam, saccharomyces boulardii, and vitamin b complex    Allergies:  No Known Allergies    Objective    Objective     Vitals:        Physical Exam  General: No apparent distress, alert and oriented x3  HEENT: Normal cephalic/atraumatic  Neck: Supple  Cardiovascular: Regular heart rate  Chest: Unlabored breathing  Abdomen: Soft, nontender and nondistended  Musculoskeletal: Decreased range of motion of the shoulder, active and passive.  Positive impingement signs.  Neurovascular intact to extremity.  Positive pulses.  Neurological: Grossly intact    Result Review    Result Review:  I have personally reviewed the results from the time of this admission to 11/7/2023 19:47 EST and agree with these findings:  []  Laboratory list / accordion  []  Microbiology  [x]  Radiology  []  EKG/Telemetry   []  Cardiology/Vascular   []  Pathology  []  Old records  []  Other:  Most notable findings include: Left rotator cuff arthropathy      Assessment & Plan   Assessment / Plan     Brief Patient Summary:  Simi Dangelo is a 73 y.o. female who has left shoulder rotator cuff arthropathy    Active Hospital Problems:  Active Hospital Problems    Diagnosis     **Tear of left rotator cuff      Plan: I discussed operative versus nonoperative treatment with the patient.  Risks and benefits of surgery were discussed.  Informed consent was obtained and the patient wishes to proceed with left reverse total shoulder replacement.      DVT prophylaxis:  No DVT prophylaxis order currently exists.    CODE STATUS:       Admission Status:  I believe this patient meets outpatient status.    Arnie Lucas MD

## 2023-11-08 NOTE — CONSULTS
[facility]   Hospitalist Consult Note  Date: 2023   Patient Name: Simi Dangelo  : 1950  MRN: [id number]  Primary Care Physician:  Shanika Crawford MD  Referring Physician: Arnie Lucas MD  Date of admission: 2023    Subjective   Subjective     Reason for Consult/ Chief Complaint: Left shoulder pain.    HPI:  Simi Dangelo is a 73 y.o. female with past medical history of hypertension and hyperlipidemia who has been experiencing left shoulder pain with minimal relief with conservative management.  Patient underwent left total shoulder reverse arthroplasty on 2023.  Medicine was consulted for medical management of her chronic conditions.  Patient was evaluated after appropriate procedure.  Patient stated that she feels sore in her left shoulder currently with no pain.  She denied any fever, chills, rigors, nausea, vomiting, headache, difficulty breathing or chest pain.  Vitals were stable and patient is saturating well on room air.    Review of Systems   All systems were reviewed and negative except for: Left shoulder soreness    Personal History     Past Medical History:  Past Medical History:   Diagnosis Date    Allergy, unspecified, subsequent encounter     Bilateral primary osteoarthritis of knee     Decreased white blood cell count, unspecified     Essential (primary) hypertension     Full incontinence of feces     Hemorrhoids     History of total right knee replacement (TKR) 2017    Hypercholesteremia     Hypertension     Left shoulder pain     Morbid (severe) obesity due to excess calories     Overweight     Pain involving joints of fingers of both hands     Presence of unspecified artificial knee joint     Pure hypercholesterolemia     Residual hemorrhoidal skin tag     Varicose veins of bilateral lower extremities with pain          Past Surgical History:  Past Surgical History:   Procedure Laterality Date    APPENDECTOMY      HYSTERECTOMY      with left oophrectomy     JOINT REPLACEMENT Right     TKR    OVARIAN CYST REMOVAL      TONSILLECTOMY AND ADENOIDECTOMY      TUBAL ABDOMINAL LIGATION      TUMOR REMOVAL Left 2007, 2011, 2014    Left thumb        Family History:   Family History   Problem Relation Age of Onset    Pulmonary embolism Brother     Lalito Hyperthermia Neg Hx        Social History:   Social History     Socioeconomic History    Marital status: Single   Tobacco Use    Smoking status: Never    Smokeless tobacco: Never   Vaping Use    Vaping Use: Never used   Substance and Sexual Activity    Alcohol use: Never    Drug use: Never    Sexual activity: Defer       Home Medications:  atorvastatin, fexofenadine, fluticasone, ketotifen, lisinopril-hydrochlorothiazide, meloxicam, saccharomyces boulardii, and vitamin b complex    Allergies:  No Known Allergies    Review of Systems   All systems were reviewed and negative except for: Left shoulder soreness    Objective    Objective     Vitals:   Temp:  [96.7 °F (35.9 °C)-97.9 °F (36.6 °C)] 97.2 °F (36.2 °C)  Heart Rate:  [74-84] 76  Resp:  [11-19] 16  BP: (110-155)/(55-91) 144/72  Flow (L/min):  [4] 4    Physical Exam:   Constitutional: Awake, alert, no acute distress   Eyes: Pupils equal, sclerae anicteric, no conjunctival injection   HENT: NCAT, mucous membranes moist   Neck: Supple, no thyromegaly, no lymphadenopathy, trachea midline   Respiratory: Clear to auscultation bilaterally, nonlabored respirations    Cardiovascular: RRR, no murmurs, rubs, or gallops, palpable pedal pulses bilaterally   Gastrointestinal: Positive bowel sounds, soft, nontender, nondistended   Musculoskeletal: Shoulder binder in situ, able to wiggle her left hand fingers, sensory intact.   Psychiatric: Appropriate affect, cooperative   Neurologic: Oriented x 3, strength symmetric in all extremities, Cranial Nerves grossly intact to confrontation, speech clear   Skin: No rashes     Result Review    Result Review:  I have personally reviewed the results  from the time of this admission to 11/8/2023 17:39 EST and agree with these findings:  []  Laboratory  []  Microbiology  []  Radiology  []  EKG/Telemetry   []  Cardiology/Vascular   []  Pathology  []  Old records  []  Other:    Assessment & Plan   Assessment / Plan     Assessment/Plan:  Tear of left rotator cuff s/p left total shoulder reverse arthroplasty  History of tension  Hyperlipidemia    -Medicine was consulted for management of her medical conditions.  -Underwent left total shoulder reverse arthroplasty today on 11/08 for tear of left rotator cuff.  Tolerated the procedure well.  -As needed IV and PO pain medications as per Ortho.  -DVT prophylaxis as per Ortho.  -We will hold home medications today.  We will restart in the morning after reviewing her lab work.  -PT/OT.  -Continue rest of the current management.    -Medicine team will continue to follow the patient throughout this hospitalization.        DVT prophylaxis:  Medical and mechanical DVT prophylaxis orders are present.    CODE STATUS:         Electronically signed by Eliazar Deutsch MD, 11/08/23, 5:39 PM EST.

## 2023-11-09 VITALS
TEMPERATURE: 97.7 F | DIASTOLIC BLOOD PRESSURE: 66 MMHG | HEART RATE: 65 BPM | BODY MASS INDEX: 32.27 KG/M2 | RESPIRATION RATE: 16 BRPM | WEIGHT: 182.1 LBS | HEIGHT: 63 IN | OXYGEN SATURATION: 95 % | SYSTOLIC BLOOD PRESSURE: 120 MMHG

## 2023-11-09 LAB
ANION GAP SERPL CALCULATED.3IONS-SCNC: 8.8 MMOL/L (ref 5–15)
BUN SERPL-MCNC: 16 MG/DL (ref 8–23)
BUN/CREAT SERPL: 22.9 (ref 7–25)
CALCIUM SPEC-SCNC: 9 MG/DL (ref 8.6–10.5)
CHLORIDE SERPL-SCNC: 100 MMOL/L (ref 98–107)
CO2 SERPL-SCNC: 24.2 MMOL/L (ref 22–29)
CREAT SERPL-MCNC: 0.7 MG/DL (ref 0.57–1)
EGFRCR SERPLBLD CKD-EPI 2021: 90.3 ML/MIN/1.73
GLUCOSE SERPL-MCNC: 150 MG/DL (ref 65–99)
HCT VFR BLD AUTO: 34 % (ref 34–46.6)
HGB BLD-MCNC: 11 G/DL (ref 12–15.9)
POTASSIUM SERPL-SCNC: 4 MMOL/L (ref 3.5–5.2)
SODIUM SERPL-SCNC: 133 MMOL/L (ref 136–145)

## 2023-11-09 PROCEDURE — 80048 BASIC METABOLIC PNL TOTAL CA: CPT | Performed by: ORTHOPAEDIC SURGERY

## 2023-11-09 PROCEDURE — 94799 UNLISTED PULMONARY SVC/PX: CPT

## 2023-11-09 PROCEDURE — A9270 NON-COVERED ITEM OR SERVICE: HCPCS | Performed by: ORTHOPAEDIC SURGERY

## 2023-11-09 PROCEDURE — 63710000001 CETIRIZINE 10 MG TABLET: Performed by: STUDENT IN AN ORGANIZED HEALTH CARE EDUCATION/TRAINING PROGRAM

## 2023-11-09 PROCEDURE — 94761 N-INVAS EAR/PLS OXIMETRY MLT: CPT

## 2023-11-09 PROCEDURE — 97165 OT EVAL LOW COMPLEX 30 MIN: CPT

## 2023-11-09 PROCEDURE — 25010000002 CEFAZOLIN IN DEXTROSE 2-4 GM/100ML-% SOLUTION: Performed by: ORTHOPAEDIC SURGERY

## 2023-11-09 PROCEDURE — 97535 SELF CARE MNGMENT TRAINING: CPT

## 2023-11-09 PROCEDURE — 63710000001 OXYCODONE-ACETAMINOPHEN 7.5-325 MG TABLET: Performed by: ORTHOPAEDIC SURGERY

## 2023-11-09 PROCEDURE — 25010000002 ENOXAPARIN PER 10 MG: Performed by: ORTHOPAEDIC SURGERY

## 2023-11-09 PROCEDURE — 63710000001 DOCUSATE SODIUM 100 MG CAPSULE: Performed by: ORTHOPAEDIC SURGERY

## 2023-11-09 PROCEDURE — 85018 HEMOGLOBIN: CPT | Performed by: ORTHOPAEDIC SURGERY

## 2023-11-09 PROCEDURE — 63710000001 FERROUS SULFATE 325 (65 FE) MG TABLET: Performed by: ORTHOPAEDIC SURGERY

## 2023-11-09 PROCEDURE — 97161 PT EVAL LOW COMPLEX 20 MIN: CPT

## 2023-11-09 PROCEDURE — 85014 HEMATOCRIT: CPT | Performed by: ORTHOPAEDIC SURGERY

## 2023-11-09 PROCEDURE — 63710000001 FAMOTIDINE 20 MG TABLET: Performed by: ORTHOPAEDIC SURGERY

## 2023-11-09 PROCEDURE — 63710000001 HYDROCHLOROTHIAZIDE 12.5 MG TABLET: Performed by: STUDENT IN AN ORGANIZED HEALTH CARE EDUCATION/TRAINING PROGRAM

## 2023-11-09 PROCEDURE — A9270 NON-COVERED ITEM OR SERVICE: HCPCS | Performed by: STUDENT IN AN ORGANIZED HEALTH CARE EDUCATION/TRAINING PROGRAM

## 2023-11-09 PROCEDURE — 63710000001 LISINOPRIL 10 MG TABLET: Performed by: STUDENT IN AN ORGANIZED HEALTH CARE EDUCATION/TRAINING PROGRAM

## 2023-11-09 PROCEDURE — 99214 OFFICE O/P EST MOD 30 MIN: CPT | Performed by: STUDENT IN AN ORGANIZED HEALTH CARE EDUCATION/TRAINING PROGRAM

## 2023-11-09 RX ORDER — OXYCODONE AND ACETAMINOPHEN 7.5; 325 MG/1; MG/1
1 TABLET ORAL EVERY 6 HOURS PRN
Qty: 36 TABLET | Refills: 0 | Status: SHIPPED | OUTPATIENT
Start: 2023-11-09

## 2023-11-09 RX ORDER — CETIRIZINE HYDROCHLORIDE 10 MG/1
10 TABLET ORAL DAILY
Status: DISCONTINUED | OUTPATIENT
Start: 2023-11-09 | End: 2023-11-09 | Stop reason: HOSPADM

## 2023-11-09 RX ORDER — FLUTICASONE PROPIONATE 50 MCG
2 SPRAY, SUSPENSION (ML) NASAL 2 TIMES DAILY
Status: DISCONTINUED | OUTPATIENT
Start: 2023-11-09 | End: 2023-11-09 | Stop reason: HOSPADM

## 2023-11-09 RX ORDER — ASPIRIN 325 MG
325 TABLET, DELAYED RELEASE (ENTERIC COATED) ORAL DAILY
Qty: 14 TABLET | Refills: 0 | Status: SHIPPED | OUTPATIENT
Start: 2023-11-09

## 2023-11-09 RX ORDER — HYDROCHLOROTHIAZIDE 12.5 MG/1
12.5 TABLET ORAL
Status: DISCONTINUED | OUTPATIENT
Start: 2023-11-09 | End: 2023-11-09 | Stop reason: HOSPADM

## 2023-11-09 RX ORDER — ATORVASTATIN CALCIUM 10 MG/1
10 TABLET, FILM COATED ORAL NIGHTLY
Status: DISCONTINUED | OUTPATIENT
Start: 2023-11-09 | End: 2023-11-09 | Stop reason: HOSPADM

## 2023-11-09 RX ORDER — LISINOPRIL 10 MG/1
10 TABLET ORAL
Status: DISCONTINUED | OUTPATIENT
Start: 2023-11-09 | End: 2023-11-09 | Stop reason: HOSPADM

## 2023-11-09 RX ADMIN — FAMOTIDINE 40 MG: 20 TABLET, FILM COATED ORAL at 10:16

## 2023-11-09 RX ADMIN — ENOXAPARIN SODIUM 40 MG: 100 INJECTION SUBCUTANEOUS at 10:15

## 2023-11-09 RX ADMIN — OXYCODONE HYDROCHLORIDE AND ACETAMINOPHEN 1 TABLET: 7.5; 325 TABLET ORAL at 01:50

## 2023-11-09 RX ADMIN — CEFAZOLIN SODIUM 2 G: 2 INJECTION, SOLUTION INTRAVENOUS at 05:48

## 2023-11-09 RX ADMIN — HYDROCHLOROTHIAZIDE 12.5 MG: 12.5 TABLET ORAL at 10:17

## 2023-11-09 RX ADMIN — FERROUS SULFATE TAB 325 MG (65 MG ELEMENTAL FE) 325 MG: 325 (65 FE) TAB at 10:17

## 2023-11-09 RX ADMIN — LISINOPRIL 10 MG: 10 TABLET ORAL at 10:18

## 2023-11-09 RX ADMIN — Medication 3 ML: at 10:15

## 2023-11-09 RX ADMIN — OXYCODONE HYDROCHLORIDE AND ACETAMINOPHEN 1 TABLET: 7.5; 325 TABLET ORAL at 10:15

## 2023-11-09 RX ADMIN — DOCUSATE SODIUM 100 MG: 100 CAPSULE, LIQUID FILLED ORAL at 01:50

## 2023-11-09 NOTE — PROGRESS NOTES
[facility]   Hospitalist Progress Note  Date: 2023  Patient Name: Simi Dangelo  : 1950  MRN: [id number]  Date of admission: 2023  Room/Bed: [facility]      Subjective   Subjective     Chief Complaint: Left shoulder pain.     Summary:Simi Dangelo is a 73 y.o. female with past medical history of hypertension and hyperlipidemia who has been experiencing left shoulder pain with minimal relief with conservative management.  Patient underwent left total shoulder reverse arthroplasty on 2023.  Medicine was consulted for medical management of her chronic conditions.  Patient was evaluated after appropriate procedure.  Patient stated that she feels sore in her left shoulder currently with no pain.  She denied any fever, chills, rigors, nausea, vomiting, headache, difficulty breathing or chest pain.  Vitals were stable and patient is saturating well on room air.     Interval Followup: No acute events overnight.  Patient mentioned having some soreness on her left shoulder but her pain is well controlled with as needed pain medication which she took last night.  She is able to walk around the unit by herself today.  She denied any fever, chills, rigors, lightheadedness, nausea, vomiting, chest pain or difficulty breathing.    Review of Systems    All systems reviewed and negative except for what is outlined above.      Objective   Objective     Vitals:   Temp:  [96.7 °F (35.9 °C)-98 °F (36.7 °C)] 97.7 °F (36.5 °C)  Heart Rate:  [61-84] 61  Resp:  [11-19] 16  BP: (108-155)/(51-91) 108/51  Flow (L/min):  [4] 4    Physical Exam   General: Awake, alert, NAD  Cardiovascular: RRR, no murmurs   Pulmonary: CTA bilaterally; no wheezes; no conversational dyspnea  Gastrointestinal: S/ND/NT, +BS  Musculoskeletal:  Shoulder binder in situ, able to wiggle her left hand fingers, sensory intact.   Skin: No jaundice, no rash on exposed skin appreciated  Neuro: Alert, awake, oriented x3; speech clear; no tremor  Psych: Mood  and affect appropriate  : No Landaverde catheter; no suprapubic tenderness    Result Review    Result Review:  I have personally reviewed these results:  [x]  Laboratory      Lab 11/09/23  0409   HEMOGLOBIN 11.0*   HEMATOCRIT 34.0         Lab 11/09/23  0409   SODIUM 133*   POTASSIUM 4.0   CHLORIDE 100   CO2 24.2   ANION GAP 8.8   BUN 16   CREATININE 0.70   EGFR 90.3   GLUCOSE 150*   CALCIUM 9.0                         Brief Urine Lab Results       None          [x]  Microbiology   Microbiology Results (last 10 days)       ** No results found for the last 240 hours. **          [x]  Radiology  XR Shoulder 1 View Left    Result Date: 11/8/2023    1. Postoperative changes from total reverse shoulder arthroplasty 2. Atelectatic changes left basilar area.      JUNG ANDRES MD       Electronically Signed and Approved By: JUNG ANDRES MD on 11/08/2023 at 16:44            []  EKG/Telemetry   []  Cardiology/Vascular   []  Pathology  []  Old records  []  Other:    Assessment & Plan   Assessment / Plan     Assessment:  Tear of left rotator cuff s/p left total shoulder reverse arthroplasty  Normocytic anemia  History of tension  Hyperlipidemia    Plan:    Underwent left total shoulder reverse arthroplasty yesterday on 11/08 for tear of left rotator cuff.  Tolerated the procedure well.  As needed IV and PO pain medications as per Ortho.  DVT prophylaxis as per Ortho.  Home medications including antihypertensives were held yesterday.  Lab work nonsignificant this morning.  We will restart all oral medications including lisinopril-hydrochlorothiazide.  Patient's blood pressure on lower normal range, given her antihypertensive medication dose is low, suggested to continue.  Patient advised to take blood pressure daily at home, hold medication if her SBP's less than 100.  Advised to contact PCP at that point.  PT/OT.  Continue rest of the current management.    Medicine team will continue to follow the patient throughout this  hospitalization.  Patient medically stable to be discharged if okay from orthopedic surgeon standpoint.       DVT prophylaxis:  Medical and mechanical DVT prophylaxis orders are present.    CODE STATUS:        Electronically signed by Eliazar Deutsch MD, 11/09/23, 7:26 AM EST.

## 2023-11-09 NOTE — PLAN OF CARE
Goal Outcome Evaluation:  Plan of Care Reviewed With: patient        Progress: no change  Outcome Evaluation: pt. is a standby assist and has ambulated 250 feet this shift.  voiding without difficulty.  pt. has been medicated x 1 for pain with relief noted.  VSS.  no significant changes noted this shift.  upon discharge pt. is going home with outpt therapy.

## 2023-11-09 NOTE — PLAN OF CARE
Goal Outcome Evaluation:              Outcome Evaluation: aox4. ambulated with standby assistance. worked with pt/ot. immobilizer in place. medicated for c/o pain per mar. discharge education provided, discharge home via private vehicle.

## 2023-11-09 NOTE — PROGRESS NOTES
[facility]     Progress Note    Patient Name: Simi Dangelo  : 1950  MRN: [id number]  Primary Care Physician:  Shanika Crawford MD  Date of admission: 2023    Subjective   Subjective     HPI:  Patient Reports doing well this morning.  Her pain is controlled.  She denies any chest pain or shortness of air.    Review of Systems   See HPI    Objective   Objective     Vitals:   Temp:  [96.7 °F (35.9 °C)-98 °F (36.7 °C)] 97.7 °F (36.5 °C)  Heart Rate:  [61-84] 61  Resp:  [11-19] 16  BP: (108-155)/(51-91) 108/51  Flow (L/min):  [4] 4  Physical Exam    General: Alert, no acute distress   Chest: Unlabored breathing, cardiovascular: Regular heart rate   Musculoskeletal: Neurovascular intact extremity.  Positive pulses.  Dressing intact.  Immobilizer intact.    Result Review      Hemoglobin   Date Value Ref Range Status   2023 11.0 (L) 12.0 - 15.9 g/dL Final     Hematocrit   Date Value Ref Range Status   2023 34.0 34.0 - 46.6 % Final        Result Review:  I have personally reviewed the results from the time of this admission to 2023 06:57 EST and agree with these findings:  [x]  Laboratory  []  Microbiology  [x]  Radiology  []  EKG/Telemetry   []  Cardiology/Vascular   []  Pathology  []  Old records  []  Other:      Assessment & Plan   Assessment / Plan     Brief Patient Summary:  Simi Dangelo is a 73 y.o. female who is status post left reverse shoulder replacement    Active Hospital Problems:  Active Hospital Problems    Diagnosis     **Tear of left rotator cuff     Arthritis of left shoulder region      Plan: Nonweightbearing left upper extremity  Shoulder immobilizer  Physical and Occupational Therapy  Pain control  DVT prophylaxis  Discharge planning: Home with therapy later today       DVT prophylaxis:  Medical and mechanical DVT prophylaxis orders are present.    CODE STATUS:      Disposition:  I expect patient to be discharged later today.    Electronically signed by Arnie NARANJO  MD Lance, 11/09/23, 6:57 AM EST.

## 2023-11-09 NOTE — PLAN OF CARE
Goal Outcome Evaluation:  Plan of Care Reviewed With: (P) patient, daughter           Outcome Evaluation: (P) Pt presents with functional BLE strength and good standing balance. She does not require skilled PT services at this time. Upon discharge outpatient therapy services are recommended to address her shoulder deficits.      Anticipated Discharge Disposition (PT): (P) home with outpatient therapy services

## 2023-11-09 NOTE — SIGNIFICANT NOTE
11/09/23 0931   Plan   Final Discharge Disposition Code 01 - home or self-care   Final Note CHIDI Tucker ([facility]) Kassandra. Appt: 11/16/23 at 11AM.

## 2023-11-09 NOTE — PLAN OF CARE
Goal Outcome Evaluation:  Plan of Care Reviewed With: patient, daughter           Outcome Evaluation: Patient has experienced decline in function from baseline status, presenting w/ deficits related to self care management, strength, and range of motion that impede patient independence with activities of daily living.  Patient would benefit from skilled Occupational Therapy intervention to maxamize patient safety, and promote return to baseline independence.      Anticipated Discharge Disposition (OT): home with outpatient therapy services

## 2023-11-09 NOTE — THERAPY EVALUATION
Patient Name: Simi Dangelo  : 1950    MRN: [id number]                              Today's Date: 2023       Admit Date: 2023    Visit Dx:     ICD-10-CM ICD-9-CM   1. Difficulty in walking  R26.2 719.7   2. Tear of left rotator cuff, unspecified tear extent, unspecified whether traumatic  M75.102 840.4   3. Decreased activities of daily living (ADL)  Z78.9 V49.89     Patient Active Problem List   Diagnosis    Hypertension    Hypercholesteremia    Encounter for annual wellness exam in Medicare patient    Primary osteoarthritis of left shoulder    Tear of left rotator cuff    Arthritis of left shoulder region     Past Medical History:   Diagnosis Date    Allergy, unspecified, subsequent encounter     Bilateral primary osteoarthritis of knee     Decreased white blood cell count, unspecified     Essential (primary) hypertension     Full incontinence of feces     Hemorrhoids     History of total right knee replacement (TKR) 2017    Hypercholesteremia     Hypertension     Left shoulder pain     Morbid (severe) obesity due to excess calories     Overweight     Pain involving joints of fingers of both hands     Presence of unspecified artificial knee joint     Pure hypercholesterolemia     Residual hemorrhoidal skin tag     Varicose veins of bilateral lower extremities with pain      Past Surgical History:   Procedure Laterality Date    APPENDECTOMY      HYSTERECTOMY      with left oophrectomy    JOINT REPLACEMENT Right     TKR    OVARIAN CYST REMOVAL      TONSILLECTOMY AND ADENOIDECTOMY      TUBAL ABDOMINAL LIGATION      TUMOR REMOVAL Left , ,     Left thumb      General Information       Row Name 23 1302 23 1134       OT Time and Intention    Document Type therapy note (daily note)  -ES evaluation  -ES    Mode of Treatment individual therapy;occupational therapy  -ES individual therapy;occupational therapy  -ES      Row Name 23 1134          General Information    Patient  Profile Reviewed yes  -ES     Prior Level of Function independent:;ADL's;all household mobility;community mobility  Patient independent with ADLs at baseline. No device for functional mobility. Tub/shower combo, standing shower completion. [place] in stance. No home O2.  -ES     Existing Precautions/Restrictions shoulder;non-weight bearing;left  Nonweightbearing left upper extremity, no external rotation, no shoulder flexion>90 °, no internal rotation >10°.  Shoulder immobilizer donned at all times.  -ES     Barriers to Rehab none identified  -ES       Row Name 11/09/23 1134          Occupational Profile    Reason for Services/Referral (Occupational Profile) Patient is 73 yr old female admitted to [facility] on 11/8/2023 after tear of left rotator cuff and degenerative changes. Patient is POD 1 left reverse total shoulder arthroplasty, NWB LLE with immobilizer donned. OT evaluation and treatment ordered d/t recent decline in ADLs/transfer ability and discharge planning recommendations. No previous OT services for current condition.  -ES       Row Name 11/09/23 1134          Living Environment    People in Home alone  will be staying with daughter at time of discharge for additional assistance  -ES       Row Name 11/09/23 1134          Home Main Entrance    Number of Stairs, Main Entrance two  -ES       Row Name 11/09/23 1302 11/09/23 1134       Cognition    Orientation Status (Cognition) --  Patient and daughter receptive to all education and training provided.  All questions addressed.  -ES oriented x 3  -ES      Row Name 11/09/23 1134          Safety Issues, Functional Mobility    Impairments Affecting Function (Mobility) range of motion (ROM);strength  -ES               User Key  (r) = Recorded By, (t) = Taken By, (c) = Cosigned By      Initials Name Provider Type    Clover Betts, OTR/L, CSRS Occupational Therapist                     Mobility/ADL's       Row Name 11/09/23 1148          Bed Mobility     Bed Mobility bed mobility (all) activities  -ES     All Activities, [place] (Bed Mobility) not tested  -ES     Comment, (Bed Mobility) not tested. patient met seated in recliner at therapy arrival to room  -ES       Row Name 11/09/23 1303 11/09/23 1148       Transfers    Transfers -- sit-stand transfer;stand-sit transfer  -ES    Comment, (Transfers) Patient provided education and training on hand placement and nonweightbearing precautions to ensure safety with transfers and mobility  -ES --      Row Name 11/09/23 1148          Sit-Stand Transfer    Sit-Stand [place] (Transfers) standby assist  -ES     Assistive Device (Sit-Stand Transfers) other (see comments)  no AD  -ES       Row Name 11/09/23 1148          Stand-Sit Transfer    Stand-Sit [place] (Transfers) standby assist  -ES     Assistive Device (Stand-Sit Transfers) other (see comments)  no AD  -ES       Row Name 11/09/23 1303 11/09/23 1148       Activities of Daily Living    BADL Assessment/Intervention --  Patient requires maximum assist to don L shoulder immobilizer. Immobilizer adjusted and modified for proper fit and patient comfort. Patient educated and trained on wear schedule, donning/doffing immobilizer, shoulder precautions, and NWB status  -ES bathing;upper body dressing;lower body dressing;grooming;feeding;toileting  -ES      Row Name 11/09/23 1303 11/09/23 1148       Mobility    Extremity Weight-bearing Status left upper extremity  -ES left upper extremity  -ES    Left Upper Extremity (Weight-bearing Status) non weight-bearing (NWB)  -ES non weight-bearing (NWB)  -ES      Row Name 11/09/23 1303 11/09/23 1148       Bathing Assessment/Intervention    [place] Level (Bathing) -- bathing skills;minimum assist (75% patient effort)  -ES    Comment, (Bathing) Patient provided education and training on adaptive upper body bathing strategies to increase patient independence with bathing at time of discharge.  Return demonstration  provided for ensured understanding  -ES --      Row Name 11/09/23 1303 11/09/23 1148       Upper Body Dressing Assessment/Training    [place] Level (Upper Body Dressing) -- upper body dressing skills;minimum assist (75% patient effort)  -ES    Comment, (Upper Body Dressing) Patient provided education and training on adaptive upper body dressing strategies to increase patient independence with dressing at time of discharge. Return demonstration provided for ensured understanding  -ES --      Row Name 11/09/23 1148          Lower Body Dressing Assessment/Training    [place] Level (Lower Body Dressing) lower body dressing skills;minimum assist (75% patient effort)  -ES       Row Name 11/09/23 1148          Grooming Assessment/Training    [place] Level (Grooming) grooming skills;set up  -ES       Row Name 11/09/23 1148          Self-Feeding Assessment/Training    [place] Level (Feeding) feeding skills;set up  -ES       Row Name 11/09/23 1148          Toileting Assessment/Training    [place] Level (Toileting) toileting skills;contact guard assist  -ES               User Key  (r) = Recorded By, (t) = Taken By, (c) = Cosigned By      Initials Name Provider Type    ES Clover William, OTR/L, CSRS Occupational Therapist                   Obj/Interventions       Row Name 11/09/23 1153          Sensory Assessment (Somatosensory)    Sensory Assessment (Somatosensory) sensation intact  -ES       Row Name 11/09/23 1153          Vision Assessment/Intervention    Visual Impairment/Limitations WFL  -ES       Row Name 11/09/23 1153          Range of Motion Comprehensive    Comment, General Range of Motion RUE AROM WFL. LUE AROM WFL distally with proximal testing deferred  -ES       Row Name 11/09/23 1153          Strength Comprehensive (MMT)    Comment, General Manual Muscle Testing (MMT) Assessment bilateral  4/5 with further testing deferred  -ES       Row Name 11/09/23 1304          Shoulder  (Therapeutic Exercise)    Shoulder (Therapeutic Exercise) pendulum exercises  -ES     Shoulder Pendulum Exercises (Therapeutic Exercise) left  Patient educated on pendulum exercises.  Demonstration provided for sitting/standing completion.  Patient instructed to complete 5-6 times a day in supported seated or standing position.  -ES       Row Name 11/09/23 1304 11/09/23 1153       Motor Skills    Motor Skills -- functional endurance  -ES    Functional Endurance -- fair plus  -ES    Therapeutic Exercise shoulder  -ES --      Row Name 11/09/23 1153          Balance    Balance Assessment sitting dynamic balance;standing dynamic balance  -ES     Dynamic Sitting Balance independent  -ES     Position, Sitting Balance unsupported;sitting in chair  -ES     Dynamic Standing Balance standby assist  -ES     Position/Device Used, Standing Balance unsupported  -ES               User Key  (r) = Recorded By, (t) = Taken By, (c) = Cosigned By      Initials Name Provider Type    ES Clover William, OTR/L, CSRS Occupational Therapist                   Goals/Plan       Row Name 11/09/23 1300          Transfer Goal 1 (OT)    Activity/Assistive Device (Transfer Goal 1, OT) transfers, all  -ES     [place] Level/Cues Needed (Transfer Goal 1, OT) independent  -ES     Time Frame (Transfer Goal 1, OT) 10 days  -ES       Row Name 11/09/23 1300          Bathing Goal 1 (OT)    Activity/Device (Bathing Goal 1, OT) bathing skills, all  -ES     [place] Level/Cues Needed (Bathing Goal 1, OT) modified independence  -ES     Time Frame (Bathing Goal 1, OT) long term goal (LTG);10 days  -ES       Row Name 11/09/23 1300          Dressing Goal 1 (OT)    Activity/Device (Dressing Goal 1, OT) dressing skills, all  -ES     [place]/Cues Needed (Dressing Goal 1, OT) modified independence  -ES     Time Frame (Dressing Goal 1, OT) long term goal (LTG);10 days  -ES       Row Name 11/09/23 1300          Toileting Goal 1 (OT)    Activity/Device  (Toileting Goal 1, OT) toileting skills, all  -ES     [place] Level/Cues Needed (Toileting Goal 1, OT) modified independence  -ES     Time Frame (Toileting Goal 1, OT) long term goal (LTG);10 days  -ES       Row Name 11/09/23 1300          Grooming Goal 1 (OT)    Activity/Device (Grooming Goal 1, OT) grooming skills, all  -ES     [place] (Grooming Goal 1, OT) modified independence  -ES     Time Frame (Grooming Goal 1, OT) long term goal (LTG);10 days  -ES       Row Name 11/09/23 1300          Therapy Assessment/Plan (OT)    Planned Therapy Interventions (OT) activity tolerance training;BADL retraining;functional balance retraining;occupation/activity based interventions;ROM/therapeutic exercise  -ES               User Key  (r) = Recorded By, (t) = Taken By, (c) = Cosigned By      Initials Name Provider Type    ES Clover William, OTR/L, CSRS Occupational Therapist                   Clinical Impression       Row Name 11/09/23 1305 11/09/23 1259       Plan of Care Review    Plan of Care Reviewed With -- patient;daughter  -ES    Outcome Evaluation Patient provided education and training on immobilizer management, wear schedule, shoulder precautions, use of adaptive strategies to increase patient safety and independence with B and IADL task engagement, transfer training to maxamize patient safety with all functional transfers, and home modification for patient success and independence at time of discharge. Patient receptive to all education and training provided.  -ES Patient has experienced decline in function from baseline status, presenting w/ deficits related to self care management, strength, and range of motion that impede patient independence with activities of daily living.  Patient would benefit from skilled Occupational Therapy intervention to maxamize patient safety, and promote return to baseline independence.  -ES      Row Name 11/09/23 1252          Therapy Assessment/Plan (OT)    Rehab Potential  (OT) good, to achieve stated therapy goals  -ES     Criteria for Skilled Therapeutic Interventions Met (OT) meets criteria;skilled treatment is necessary  -ES     Therapy Frequency (OT) 5 times/wk  -ES       Row Name 11/09/23 1259          Therapy Plan Review/Discharge Plan (OT)    Anticipated Discharge Disposition (OT) home with outpatient therapy services  -ES               User Key  (r) = Recorded By, (t) = Taken By, (c) = Cosigned By      Initials Name Provider Type    ES Clover William, OTR/L, CSRS Occupational Therapist                   Outcome Measures       Row Name 11/09/23 1301          How much help from another is currently needed...    Putting on and taking off regular lower body clothing? 2  -ES     Bathing (including washing, rinsing, and drying) 2  -ES     Toileting (which includes using toilet bed pan or urinal) 2  -ES     Putting on and taking off regular upper body clothing 3  -ES     Taking care of personal grooming (such as brushing teeth) 3  -ES     Eating meals 4  -ES     AM-PAC 6 Clicks Score (OT) 16  -ES       Row Name 11/09/23 1100 11/09/23 1012       How much help from another person do you currently need...    Turning from your back to your side while in flat bed without using bedrails? 4 (P)   -ZT 4  -RF    Moving from lying on back to sitting on the side of a flat bed without bedrails? 3 (P)   -ZT 3  -RF    Moving to and from a bed to a chair (including a wheelchair)? 4 (P)   -ZT 3  -RF    Standing up from a chair using your arms (e.g., wheelchair, bedside chair)? 4 (P)   -ZT 3  -RF    Climbing 3-5 steps with a railing? 3 (P)   -ZT 3  -RF    To walk in hospital room? 4 (P)   -ZT 3  -RF    AM-PAC 6 Clicks Score (PT) 22 (P)   -ZT 19  -RF    Highest level of mobility 7 --> Walked 25 feet or more (P)   -ZT 6 --> Walked 10 steps or more  -RF      Row Name 11/09/23 1301          Functional Assessment    Outcome Measure Options AM-PAC 6 Clicks Daily Activity (OT);Optimal Instrument  -ES        Row Name 11/09/23 1301          Optimal Instrument    Optimal Instrument Optimal - 3  -ES     Bending/Stooping 2  -ES     Standing 2  -ES     Reaching 2  -ES     From the list, choose the 3 activities you would most like to be able to do without any difficulty Bending/stooping;Standing;Reaching  -ES     Total Score Optimal - 3 6  -ES               User Key  (r) = Recorded By, (t) = Taken By, (c) = Cosigned By      Initials Name Provider Type    RF Venancio Lindsay, RN Registered Nurse    Clover Betts, OTR/L, CSRS Occupational Therapist    Aaron Watts, PT Student PT Student                      OT Recommendation and Plan  Planned Therapy Interventions (OT): activity tolerance training, BADL retraining, functional balance retraining, occupation/activity based interventions, ROM/therapeutic exercise  Therapy Frequency (OT): 5 times/wk  Plan of Care Review  Plan of Care Reviewed With: patient, daughter  Outcome Evaluation: Patient provided education and training on immobilizer management, wear schedule, shoulder precautions, use of adaptive strategies to increase patient safety and independence with B and IADL task engagement, transfer training to maxamize patient safety with all functional transfers, and home modification for patient success and independence at time of discharge. Patient receptive to all education and training provided.     Time Calculation:   Evaluation Complexity (OT)  Review Occupational Profile/Medical/Therapy History Complexity: brief/low complexity  Assessment, Occupational Performance/Identification of Deficit Complexity: 3-5 performance deficits  Clinical Decision Making Complexity (OT): problem focused assessment/low complexity  Overall Complexity of Evaluation (OT): low complexity     Time Calculation- OT       Row Name 11/09/23 1302             Time Calculation- OT    OT Goal Re-Cert Due Date 11/18/23  -ES         Timed Charges    93306 - OT Self Care/Mgmt Minutes 30  -ES          Untimed Charges    OT Eval/Re-eval Minutes 20  -ES         Total Minutes    Timed Charges Total Minutes 30  -ES      Untimed Charges Total Minutes 20  -ES       Total Minutes 50  -ES                User Key  (r) = Recorded By, (t) = Taken By, (c) = Cosigned By      Initials Name Provider Type    ES Clover William, OTR/L, CSRS Occupational Therapist                  Therapy Charges for Today       Code Description Service Date Service Provider Modifiers Qty    48702541753 HC OT SELF CARE/MGMT/TRAIN EA 15 MIN 11/9/2023 Clover William OTR/L, CSRS GO 2    47449585836 HC OT EVAL LOW COMPLEXITY 2 11/9/2023 Clover William OTR/L, CSRS GO 1                 GABBY Stroud/L, CSRS  11/9/2023

## 2023-11-09 NOTE — THERAPY EVALUATION
Acute Care - Physical Therapy Initial Evaluation  QAMAR Preston     Patient Name: Simi Dangelo  : 1950  MRN: [id number]  Today's Date: 2023      Visit Dx:     ICD-10-CM ICD-9-CM   1. Difficulty in walking  R26.2 719.7   2. Tear of left rotator cuff, unspecified tear extent, unspecified whether traumatic  M75.102 840.4   3. Decreased activities of daily living (ADL)  Z78.9 V49.89     Patient Active Problem List   Diagnosis    Hypertension    Hypercholesteremia    Encounter for annual wellness exam in Medicare patient    Primary osteoarthritis of left shoulder    Tear of left rotator cuff    Arthritis of left shoulder region     Past Medical History:   Diagnosis Date    Allergy, unspecified, subsequent encounter     Bilateral primary osteoarthritis of knee     Decreased white blood cell count, unspecified     Essential (primary) hypertension     Full incontinence of feces     Hemorrhoids     History of total right knee replacement (TKR) 2017    Hypercholesteremia     Hypertension     Left shoulder pain     Morbid (severe) obesity due to excess calories     Overweight     Pain involving joints of fingers of both hands     Presence of unspecified artificial knee joint     Pure hypercholesterolemia     Residual hemorrhoidal skin tag     Varicose veins of bilateral lower extremities with pain      Past Surgical History:   Procedure Laterality Date    APPENDECTOMY      HYSTERECTOMY      with left oophrectomy    JOINT REPLACEMENT Right     TKR    OVARIAN CYST REMOVAL      TONSILLECTOMY AND ADENOIDECTOMY      TUBAL ABDOMINAL LIGATION      TUMOR REMOVAL Left , ,     Left thumb     PT Assessment (last 12 hours)       PT Evaluation and Treatment       Row Name 23 1133          Physical Therapy Time and Intention    Subjective Information no complaints (P)   -ZT     Document Type evaluation (P)   -ZT     Mode of Treatment individual therapy;physical therapy (P)   -ZT     Patient Effort good (P)   -ZT        Row Name 11/09/23 1133          General Information    Patient Profile Reviewed yes (P)   -ZT     Patient Observations alert;cooperative;agree to therapy (P)   -ZT     Prior Level of Function independent:;all household mobility;ADL's (P)   -ZT     Equipment Currently Used at Home none (P)   -ZT     Existing Precautions/Restrictions fall (P)   -ZT       [facility] Name 11/09/23 1133          Living Environment    Current Living Arrangements home (P)   -ZT     Home Accessibility wheelchair accessible (P)   -ZT     People in Home alone (P)   -ZT     Primary Care Provided by self (P)   -ZT       Row Name 11/09/23 1133          Home Use of Assistive/Adaptive Equipment    Equipment Currently Used at Home none (P)   -ZT       [facility] Name 11/09/23 1133          Range of Motion (ROM)    Range of Motion bilateral lower extremities;ROM is WFL (P)   -[facility] Name 11/09/23 1133          Strength (Manual Muscle Testing)    Strength (Manual Muscle Testing) bilateral lower extremities;strength is WFL;other (see comments) (P)   5/5 BLE strength  -[facility] Name 11/09/23 1133          Transfers    Transfers sit-stand transfer;stand-sit transfer (P)   -ZT       [facility] Name 11/09/23 1133          Sit-Stand Transfer    Sit-Stand [place] (Transfers) independent (P)   -ZT       Row Name 11/09/23 1133          Stand-Sit Transfer    Stand-Sit [place] (Transfers) independent (P)   -[facility] Name 11/09/23 1133          Gait/Stairs (Locomotion)    Gait/Stairs Locomotion gait/ambulation independence (P)   -ZT     [place] Level (Gait) independent (P)   -ZT     Distance in Feet (Gait) 100 (P)   -ZT     Pattern (Gait) step-through (P)   -ZT       Row Name 11/09/23 1133          Safety Issues, Functional Mobility    Impairments Affecting Function (Mobility) endurance/activity tolerance (P)   -ZT       [facility] Name 11/09/23 1133          Balance    Balance Assessment standing dynamic balance (P)   -ZT     Dynamic Standing Balance  independent (P)   -ZT     Position/Device Used, Standing Balance unsupported (P)   -ZT       Row Name             Wound 11/08/23 1511 Left shoulder Incision    Wound - Properties Group Placement Date: 11/08/23 -HG Placement Time: 1511 -HG Side: Left  -HG Location: shoulder  -HG Primary Wound Type: Incision  -HG    Retired Wound - Properties Group Placement Date: 11/08/23 -HG Placement Time: 1511 -HG Side: Left  -HG Location: shoulder  -HG Primary Wound Type: Incision  -HG    Retired Wound - Properties Group Date first assessed: 11/08/23 -HG Time first assessed: 1511 -HG Side: Left  -HG Location: shoulder  -HG Primary Wound Type: Incision  -HG      Row Name 11/09/23 1133          Plan of Care Review    Plan of Care Reviewed With patient;daughter (P)   -ZT     Outcome Evaluation Pt presents with functional BLE strength and good standing balance. She does not require skilled PT services at this time. Upon discharge outpatient therapy services are recommended to address her shoulder deficits. (P)   -ZT       Row Name 11/09/23 1133          Positioning and Restraints    Pre-Treatment Position sitting in chair/recliner (P)   -ZT     Post Treatment Position chair (P)   -ZT     In Chair call light within reach;encouraged to call for assist;exit alarm on (P)   -ZT       Row Name 11/09/23 1133          Therapy Assessment/Plan (PT)    Criteria for Skilled Interventions Met (PT) no;no problems identified which require skilled intervention (P)   -ZT     Therapy Frequency (PT) evaluation only (P)   -ZT       Row Name 11/09/23 1133          Therapy Plan Review/Discharge Plan (PT)    Therapy Plan Review (PT) evaluation/treatment results reviewed;care plan/treatment goals reviewed;participants included;patient;daughter (P)   -ZT               User Key  (r) = Recorded By, (t) = Taken By, (c) = Cosigned By      Initials Name Provider Type    Houston Trevino, RN Registered Nurse    ZT Aaron Godoy, PT Student PT Student                     Physical Therapy Education       Title: PT OT SLP Therapies (Done)       Topic: Physical Therapy (Done)       Point: Mobility training (Done)       Learning Progress Summary             Patient Acceptance, E,TB, VU by ZT at 11/9/2023 1136                         Point: Home exercise program (Done)       Learning Progress Summary             Patient Acceptance, E,TB, VU by ZT at 11/9/2023 1136                         Point: Body mechanics (Done)       Learning Progress Summary             Patient Acceptance, E,TB, VU by ZT at 11/9/2023 1136                         Point: Precautions (Done)       Learning Progress Summary             Patient Acceptance, E,TB, VU by ZT at 11/9/2023 1136                                         User Key       Initials Effective Dates Name Provider Type Discipline    ZT 09/05/23 -  Aaron Godoy, PT Student PT Student PT                  PT Recommendation and Plan  Anticipated Discharge Disposition (PT): (P) home with outpatient therapy services  Therapy Frequency (PT): (P) evaluation only  Plan of Care Reviewed With: (P) patient, daughter  Outcome Evaluation: (P) Pt presents with functional BLE strength and good standing balance. She does not require skilled PT services at this time. Upon discharge outpatient therapy services are recommended to address her shoulder deficits.   Outcome Measures       Row Name 11/09/23 1100             How much help from another person do you currently need...    Turning from your back to your side while in flat bed without using bedrails? 4 (P)   -ZT      Moving from lying on back to sitting on the side of a flat bed without bedrails? 3 (P)   -ZT      Moving to and from a bed to a chair (including a wheelchair)? 4 (P)   -ZT      Standing up from a chair using your arms (e.g., wheelchair, bedside chair)? 4 (P)   -ZT      Climbing 3-5 steps with a railing? 3 (P)   -ZT      To walk in hospital room? 4 (P)   -ZT      AM-PAC 6 Clicks Score  (PT) 22 (P)   -ZT      Highest level of mobility 7 --> Walked 25 feet or more (P)   -ZT                User Key  (r) = Recorded By, (t) = Taken By, (c) = Cosigned By      Initials Name Provider Type    ZT Aaron Godoy, PT Student PT Student                     Time Calculation:    PT Charges       Row Name 11/09/23 1133             Time Calculation    PT Received On 11/09/23 (P)   -ZT      PT Goal Re-Cert Due Date 11/18/23 (P)   -ZT         Untimed Charges    PT Eval/Re-eval Minutes 25 (P)   -ZT         Total Minutes    Untimed Charges Total Minutes 25 (P)   -ZT       Total Minutes 25 (P)   -ZT                User Key  (r) = Recorded By, (t) = Taken By, (c) = Cosigned By      Initials Name Provider Type    ZT Aaron Godoy, PT Student PT Student                      PT [id number]  AM-PAC 6 Clicks Score (PT): (P) 22    Aaron Godoy PT Student  11/9/2023

## 2023-11-21 ENCOUNTER — OFFICE VISIT (OUTPATIENT)
Dept: ORTHOPEDIC SURGERY | Facility: CLINIC | Age: 73
End: 2023-11-21
Payer: MEDICARE

## 2023-11-21 VITALS
BODY MASS INDEX: 32.27 KG/M2 | HEART RATE: 70 BPM | WEIGHT: 182.1 LBS | SYSTOLIC BLOOD PRESSURE: 117 MMHG | HEIGHT: 63 IN | DIASTOLIC BLOOD PRESSURE: 72 MMHG | OXYGEN SATURATION: 92 %

## 2023-11-21 DIAGNOSIS — M75.102 TEAR OF LEFT ROTATOR CUFF, UNSPECIFIED TEAR EXTENT, UNSPECIFIED WHETHER TRAUMATIC: ICD-10-CM

## 2023-11-21 DIAGNOSIS — Z47.89 AFTERCARE FOLLOWING SURGERY OF THE MUSCULOSKELETAL SYSTEM: Primary | ICD-10-CM

## 2023-11-21 DIAGNOSIS — M25.512 LEFT SHOULDER PAIN, UNSPECIFIED CHRONICITY: ICD-10-CM

## 2023-11-21 PROCEDURE — 3074F SYST BP LT 130 MM HG: CPT | Performed by: PHYSICIAN ASSISTANT

## 2023-11-21 PROCEDURE — 99024 POSTOP FOLLOW-UP VISIT: CPT | Performed by: PHYSICIAN ASSISTANT

## 2023-11-21 PROCEDURE — 3078F DIAST BP <80 MM HG: CPT | Performed by: PHYSICIAN ASSISTANT

## 2023-11-21 PROCEDURE — 1160F RVW MEDS BY RX/DR IN RCRD: CPT | Performed by: PHYSICIAN ASSISTANT

## 2023-11-21 PROCEDURE — 1159F MED LIST DOCD IN RCRD: CPT | Performed by: PHYSICIAN ASSISTANT

## 2023-11-21 RX ORDER — HYDROCODONE BITARTRATE AND ACETAMINOPHEN 7.5; 325 MG/1; MG/1
1 TABLET ORAL EVERY 6 HOURS PRN
Qty: 28 TABLET | Refills: 0 | Status: SHIPPED | OUTPATIENT
Start: 2023-11-21

## 2023-11-21 RX ORDER — OXYCODONE AND ACETAMINOPHEN 7.5; 325 MG/1; MG/1
1 TABLET ORAL EVERY 6 HOURS PRN
Qty: 28 TABLET | Refills: 0 | Status: CANCELLED | OUTPATIENT
Start: 2023-11-21

## 2023-11-21 NOTE — PROGRESS NOTES
"Chief Complaint  Pain and Follow-up of the Left Shoulder and Suture / Staple Removal    Subjective          History of Present Illness      Simi Dangelo is a 73 y.o. female  presents to [facility] for     Patient presents for 2-week postop evaluation of left reverse total shoulder arthroplasty, 11/8/2023.  She is here with her daughter Suresh.  Patient is attending therapy in [place] she has already had her initial evaluation and has a visit today.  She is taking pain medication she states she would like to be reduced to a less strong pain medication.  She states the incision has been healing well denies redness swelling drainage.  She had staples removed today and Steri-Strips were placed.  Patient has been compliant with sling use.  She is taking aspirin.  She denies numbness and tingling.      No Known Allergies     Social History     Socioeconomic History    Marital status: Single   Tobacco Use    Smoking status: Never    Smokeless tobacco: Never   Vaping Use    Vaping Use: Never used   Substance and Sexual Activity    Alcohol use: Never    Drug use: Never    Sexual activity: Defer        REVIEW OF SYSTEMS    Constitutional: Awake alert and oriented x3, no acute distress, denies fevers, chills, weight loss  Respiratory: No respiratory distress  Vascular: Brisk cap refill, Intact distal pulses, No cyanosis, compartments soft with no signs or symptoms of compartment syndrome or DVT.   Cardiovascular: Denies chest pain, shortness of breath  Skin: Denies rashes, acute skin changes  Neurologic: Denies headache, loss of consciousness  MSK: Left shoulder pain      Objective   Vital Signs:   /72   Pulse 70   Ht 160 cm (63\")   Wt 82.6 kg (182 lb 1.6 oz)   SpO2 92%   BMI 32.26 kg/m²     Body mass index is 32.26 kg/m².    Physical Exam       Left shoulder: Incision is healing well, no erythema, no drainage or dehiscence, no signs of infection, Steri-Strips were placed after " staples were removed.  Elbow wrist hand range of motion intact/appropriate, shoulder range of motion appropriate, neurovascular intact      Procedures    Imaging Results (Most Recent)       Procedure Component Value Units Date/Time    XR Scapula Left [[id number]] Resulted: 11/21/23 1111     Updated: 11/21/23 1112    Narrative:      View:AP/Lateral view(s)  Site: Left shoulder  Indication: Left shoulder pain  Study: X-rays ordered, taken in the office, and reviewed today  X-ray: Intact appearing left reverse total shoulder arthroplasty, no signs   of hardware failure or loosening, no subsidence or periprosthetic   fracture, good alignment  Comparative data: Postoperative studies             Result Review :   The following data was reviewed by: MIKE Laureano on 11/21/2023:  Data reviewed : Radiologic studies reviewed by me with the patient              Assessment and Plan    Diagnoses and all orders for this visit:    1. Aftercare following surgery of left reverse total shoulder arthroplasty, 11/8/2023 (Primary)    2. Left shoulder pain, unspecified chronicity  -     XR Scapula Left        Reviewed x-rays with the patient discussed diagnosis and treatment options with her and her family.  Patient was advised to continue/finish the aspirin, continue pain medication as needed she was given a refill for Norco.  She was advised to continue sling use for another 2 weeks, continue physical therapy plan, follow-up in 4 weeks for recheck    Call or return if worsening symptoms.    Follow Up   Return in about 4 weeks (around 12/19/2023) for Recheck.  Patient was given instructions and counseling regarding her condition or for health maintenance advice. Please see specific information pulled into the AVS if appropriate.       EMR Dragon/Transcription disclaimer:  Much of this encounter note is an electronic transcription/translation of spoken language to printed text, aka voice recognition.  The electronic  translation of spoken language may permit erroneous or at times nonsensical words or phrases to be inadvertently transcribed; although I have reviewed the note for such errors, some may still exist so please interpret based on surrounding text content.

## 2023-12-26 ENCOUNTER — OFFICE VISIT (OUTPATIENT)
Dept: ORTHOPEDIC SURGERY | Facility: CLINIC | Age: 73
End: 2023-12-26
Payer: MEDICARE

## 2023-12-26 VITALS — OXYGEN SATURATION: 98 % | WEIGHT: 178 LBS | BODY MASS INDEX: 31.54 KG/M2 | HEART RATE: 70 BPM | HEIGHT: 63 IN

## 2023-12-26 DIAGNOSIS — Z47.89 AFTERCARE FOLLOWING SURGERY OF THE MUSCULOSKELETAL SYSTEM: Primary | ICD-10-CM

## 2023-12-26 PROCEDURE — 99024 POSTOP FOLLOW-UP VISIT: CPT | Performed by: PHYSICIAN ASSISTANT

## 2023-12-26 PROCEDURE — 1159F MED LIST DOCD IN RCRD: CPT | Performed by: PHYSICIAN ASSISTANT

## 2023-12-26 PROCEDURE — 1160F RVW MEDS BY RX/DR IN RCRD: CPT | Performed by: PHYSICIAN ASSISTANT

## 2023-12-26 NOTE — PROGRESS NOTES
"Chief Complaint  Follow-up and Pain of the Left Shoulder    Subjective          History of Present Illness      Simi Dangelo is a 73 y.o. female  presents to [facility] for     Patient presents for follow-up evaluation of left reverse total shoulder arthroplasty.  11/8/2023.  She states she is recovering well she is on her fourth week of physical therapy she states she is advancing with range of motion.  She states she only takes pain medication as needed.  She states the incision has healed well.  She presents in her sling she states she wears her sling with most activities, she states she did not know she can discontinue it up to about 2 weeks ago.  She states she has been hesitant to use her left upper extremity for activities.  She denies fever/chills, denies redness swelling or drainage from the incision site.    No Known Allergies     Social History     Socioeconomic History    Marital status: Single   Tobacco Use    Smoking status: Never    Smokeless tobacco: Never   Vaping Use    Vaping Use: Never used   Substance and Sexual Activity    Alcohol use: Never    Drug use: Never    Sexual activity: Defer        REVIEW OF SYSTEMS    Constitutional: Awake alert and oriented x3, no acute distress, denies fevers, chills, weight loss  Respiratory: No respiratory distress  Vascular: Brisk cap refill, Intact distal pulses, No cyanosis, compartments soft with no signs or symptoms of compartment syndrome or DVT.   Cardiovascular: Denies chest pain, shortness of breath  Skin: Denies rashes, acute skin changes  Neurologic: Denies headache, loss of consciousness  MSK: Left shoulder pain      Objective   Vital Signs:   Pulse 70   Ht 160 cm (63\")   Wt 80.7 kg (178 lb)   SpO2 98%   BMI 31.53 kg/m²     Body mass index is 31.53 kg/m².    Physical Exam       Left shoulder: Well-healed incision, no erythema, no drainage, no dehiscence or signs of infection, active forward elevation 90 active " abduction 80, external rotation at side 45, elbow wrist hand range of motion intact/appropriate, neurovascular intact.      Procedures    Imaging Results (Most Recent)       None             Result Review :   The following data was reviewed by: MIKE Laureano on 12/26/2023:               Assessment and Plan    Diagnoses and all orders for this visit:    1. Aftercare following surgery of left reverse total shoulder arthroplasty, 11/8/2023 (Primary)  -     Ambulatory Referral to Physical Therapy Evaluate and treat (2-3X/WEEK FOR 6-8 WEEKS)        Discussed diagnosis and treatment options with the patient, she was advised to continue physical therapy, for strength and range of motion, continue pain medication as needed, she may call for refills, she was strongly advised to wean out of her sling and get more aggressive with strength and range of motion under therapy guidance, follow-up in 6 weeks for recheck with x-rays    Call or return if worsening symptoms.    Follow Up   Return in about 6 weeks (around 2/6/2024) for Recheck.  Patient was given instructions and counseling regarding her condition or for health maintenance advice. Please see specific information pulled into the AVS if appropriate.       EMR Dragon/Transcription disclaimer:  Much of this encounter note is an electronic transcription/translation of spoken language to printed text, aka voice recognition.  The electronic translation of spoken language may permit erroneous or at times nonsensical words or phrases to be inadvertently transcribed; although I have reviewed the note for such errors, some may still exist so please interpret based on surrounding text content.

## 2024-01-15 DIAGNOSIS — I10 PRIMARY HYPERTENSION: ICD-10-CM

## 2024-01-15 RX ORDER — LISINOPRIL AND HYDROCHLOROTHIAZIDE 12.5; 1 MG/1; MG/1
1 TABLET ORAL DAILY
Qty: 90 TABLET | Refills: 0 | Status: SHIPPED | OUTPATIENT
Start: 2024-01-15

## 2024-01-15 NOTE — TELEPHONE ENCOUNTER
Patient has been R/S due to our weather delay today and is asking if Dr. Crawford could put in her lab orders so that she can have them done before her appt on 01/30/24. She is also asking for a refill on Lisinopril-HCTZ 10-12.5 MG 1qd.

## 2024-01-22 ENCOUNTER — TELEPHONE (OUTPATIENT)
Dept: FAMILY MEDICINE CLINIC | Age: 74
End: 2024-01-22
Payer: MEDICARE

## 2024-01-23 DIAGNOSIS — I10 PRIMARY HYPERTENSION: Primary | ICD-10-CM

## 2024-01-23 DIAGNOSIS — E78.00 HYPERCHOLESTEREMIA: ICD-10-CM

## 2024-01-24 ENCOUNTER — LAB (OUTPATIENT)
Dept: LAB | Facility: HOSPITAL | Age: 74
End: 2024-01-24
Payer: MEDICARE

## 2024-01-24 DIAGNOSIS — E78.00 HYPERCHOLESTEREMIA: ICD-10-CM

## 2024-01-24 DIAGNOSIS — I10 PRIMARY HYPERTENSION: ICD-10-CM

## 2024-01-24 LAB
ALBUMIN SERPL-MCNC: 4.1 G/DL (ref 3.5–5.2)
ALBUMIN/GLOB SERPL: 1.6 G/DL
ALP SERPL-CCNC: 95 U/L (ref 39–117)
ALT SERPL W P-5'-P-CCNC: 13 U/L (ref 1–33)
ANION GAP SERPL CALCULATED.3IONS-SCNC: 10.9 MMOL/L (ref 5–15)
AST SERPL-CCNC: 14 U/L (ref 1–32)
BILIRUB SERPL-MCNC: 0.8 MG/DL (ref 0–1.2)
BUN SERPL-MCNC: 11 MG/DL (ref 8–23)
BUN/CREAT SERPL: 16.7 (ref 7–25)
CALCIUM SPEC-SCNC: 9.4 MG/DL (ref 8.6–10.5)
CHLORIDE SERPL-SCNC: 102 MMOL/L (ref 98–107)
CHOLEST SERPL-MCNC: 161 MG/DL (ref 0–200)
CO2 SERPL-SCNC: 28.1 MMOL/L (ref 22–29)
CREAT SERPL-MCNC: 0.66 MG/DL (ref 0.57–1)
EGFRCR SERPLBLD CKD-EPI 2021: 91.6 ML/MIN/1.73
GLOBULIN UR ELPH-MCNC: 2.5 GM/DL
GLUCOSE SERPL-MCNC: 97 MG/DL (ref 65–99)
HDLC SERPL-MCNC: 58 MG/DL (ref 40–60)
LDLC SERPL CALC-MCNC: 86 MG/DL (ref 0–100)
LDLC/HDLC SERPL: 1.47 {RATIO}
POTASSIUM SERPL-SCNC: 4 MMOL/L (ref 3.5–5.2)
PROT SERPL-MCNC: 6.6 G/DL (ref 6–8.5)
SODIUM SERPL-SCNC: 141 MMOL/L (ref 136–145)
TRIGL SERPL-MCNC: 89 MG/DL (ref 0–150)
VLDLC SERPL-MCNC: 17 MG/DL (ref 5–40)

## 2024-01-24 PROCEDURE — 80053 COMPREHEN METABOLIC PANEL: CPT

## 2024-01-24 PROCEDURE — 36415 COLL VENOUS BLD VENIPUNCTURE: CPT

## 2024-01-24 PROCEDURE — 80061 LIPID PANEL: CPT

## 2024-01-31 ENCOUNTER — OFFICE VISIT (OUTPATIENT)
Dept: FAMILY MEDICINE CLINIC | Age: 74
End: 2024-01-31
Payer: MEDICARE

## 2024-01-31 VITALS
DIASTOLIC BLOOD PRESSURE: 72 MMHG | SYSTOLIC BLOOD PRESSURE: 120 MMHG | WEIGHT: 184 LBS | BODY MASS INDEX: 32.6 KG/M2 | HEART RATE: 70 BPM | HEIGHT: 63 IN | OXYGEN SATURATION: 97 %

## 2024-01-31 DIAGNOSIS — J30.1 ALLERGIC RHINITIS DUE TO POLLEN, UNSPECIFIED SEASONALITY: ICD-10-CM

## 2024-01-31 DIAGNOSIS — E78.00 HYPERCHOLESTEREMIA: ICD-10-CM

## 2024-01-31 DIAGNOSIS — R73.03 PREDIABETES: ICD-10-CM

## 2024-01-31 DIAGNOSIS — I10 PRIMARY HYPERTENSION: Primary | ICD-10-CM

## 2024-01-31 DIAGNOSIS — I10 PRIMARY HYPERTENSION: ICD-10-CM

## 2024-01-31 RX ORDER — LISINOPRIL AND HYDROCHLOROTHIAZIDE 12.5; 1 MG/1; MG/1
1 TABLET ORAL DAILY
Qty: 90 TABLET | Refills: 1 | Status: SHIPPED | OUTPATIENT
Start: 2024-01-31 | End: 2024-02-02 | Stop reason: SDUPTHER

## 2024-01-31 RX ORDER — FLUTICASONE PROPIONATE 50 MCG
1 SPRAY, SUSPENSION (ML) NASAL 2 TIMES DAILY
Qty: 16 G | Refills: 5 | Status: SHIPPED | OUTPATIENT
Start: 2024-01-31 | End: 2024-01-31 | Stop reason: SDUPTHER

## 2024-01-31 RX ORDER — FLUTICASONE PROPIONATE 50 MCG
1 SPRAY, SUSPENSION (ML) NASAL 2 TIMES DAILY
Qty: 16 G | Refills: 5 | Status: SHIPPED | OUTPATIENT
Start: 2024-01-31

## 2024-01-31 RX ORDER — ATORVASTATIN CALCIUM 10 MG/1
10 TABLET, FILM COATED ORAL NIGHTLY
Qty: 90 TABLET | Refills: 1 | Status: SHIPPED | OUTPATIENT
Start: 2024-01-31 | End: 2024-01-31 | Stop reason: SDUPTHER

## 2024-01-31 RX ORDER — ATORVASTATIN CALCIUM 10 MG/1
10 TABLET, FILM COATED ORAL NIGHTLY
Qty: 90 TABLET | Refills: 1 | Status: SHIPPED | OUTPATIENT
Start: 2024-01-31

## 2024-01-31 RX ORDER — LISINOPRIL AND HYDROCHLOROTHIAZIDE 12.5; 1 MG/1; MG/1
1 TABLET ORAL DAILY
Qty: 14 TABLET | Refills: 0 | Status: SHIPPED | OUTPATIENT
Start: 2024-01-31 | End: 2024-01-31 | Stop reason: SDUPTHER

## 2024-01-31 RX ORDER — LISINOPRIL AND HYDROCHLOROTHIAZIDE 12.5; 1 MG/1; MG/1
1 TABLET ORAL DAILY
Qty: 90 TABLET | Refills: 1 | Status: SHIPPED | OUTPATIENT
Start: 2024-01-31 | End: 2024-01-31 | Stop reason: SDUPTHER

## 2024-01-31 NOTE — PROGRESS NOTES
Simi Dangelo presents to [facility].    Chief Complaint:    Subjective     History of Present Illness:  Hypertension      She is status post reverse shoulder replacement of the left shoulder with Dr Lucas, she is stable and doing well.    She presents with chronic left knee pain, she does see orthopedic for intermittent steroid joint injections.  She is now having no acute issues at this time    She presents  with pure hypercholesterolemia; current treatment includes Lipitor 10 mg daily and a low cholesterol/low fat diet.  Compliance with treatment has been good; she maintains her low cholesterol diet.           She also presents with essential (primary) hypertension, denies using any nonpharmacologic treatment modalities, recommend low-sodium diet.  Her current medication regimen includes a diuretic HCTZ and an ACE inhibitor lisinopril, she tolerates meds well.  Compliance with treatment has been good; she takes her medication as directed, maintains her diet and exercise regimen, and follows up as directed.  Ms. Dangelo checks her blood pressure at home intermittently     She has chronic allergies are controlled on allegra 180 mg daily, h/o allergy shots but she does not need these anymore      Borderline anemia.  She is stable and doing quite well on B12 supplementation, follow-up hematocrit prior to her surgery was normal range          Result Review   The following data was reviewed by Shanika Crawford MD on 01/31/2024.  Lab Results   Component Value Date    WBC 5.75 11/01/2023    HGB 11.0 (L) 11/09/2023    HCT 34.0 11/09/2023    MCV 84.8 11/01/2023     11/01/2023     Lab Results   Component Value Date    GLUCOSE 97 01/24/2024    BUN 11 01/24/2024    CREATININE 0.66 01/24/2024    EGFR 91.6 01/24/2024    BCR 16.7 01/24/2024    K 4.0 01/24/2024    CO2 28.1 01/24/2024    CALCIUM 9.4 01/24/2024    ALBUMIN 4.1 01/24/2024    BILITOT 0.8 01/24/2024    AST 14 01/24/2024    ALT  "13 01/24/2024     Lab Results   Component Value Date    CHOL 161 01/24/2024    CHLPL 178 12/14/2020    TRIG 89 01/24/2024    HDL 58 01/24/2024    LDL 86 01/24/2024     No results found for: \"TSH\"  Lab Results   Component Value Date    HGBA1C 6.30 (H) 11/01/2023     No results found for: \"PSA\"      No results found for: \"MGLK64TG\"            Assessment and Plan:   Diagnoses and all orders for this visit:    1. Primary hypertension (Primary)  Comments:  Blood pressure stable and well-controlled on lisinopril with HCTZ.  Recommend low-sodium diet.  Otherwise no changes needed current meds/Tx plan  Orders:  -     Discontinue: lisinopril-hydrochlorothiazide (PRINZIDE,ZESTORETIC) 10-12.5 MG per tablet; Take 1 tablet by mouth Daily.  Dispense: 90 tablet; Refill: 1  -     Discontinue: lisinopril-hydrochlorothiazide (PRINZIDE,ZESTORETIC) 10-12.5 MG per tablet; Take 1 tablet by mouth Daily.  Dispense: 14 tablet; Refill: 0  -     lisinopril-hydrochlorothiazide (PRINZIDE,ZESTORETIC) 10-12.5 MG per tablet; Take 1 tablet by mouth Daily.  Dispense: 90 tablet; Refill: 1    2. Hypercholesteremia  Comments:  Stable on atorvastatin.  Tolerating medication well.  Previous labs reviewed and new labs ordered  Orders:  -     Discontinue: atorvastatin (LIPITOR) 10 MG tablet; Take 1 tablet by mouth Every Night.  Dispense: 90 tablet; Refill: 1  -     atorvastatin (LIPITOR) 10 MG tablet; Take 1 tablet by mouth Every Night.  Dispense: 90 tablet; Refill: 1    3. Allergic rhinitis due to pollen, unspecified seasonality  Comments:  Stable on Allegra and Flonase nasal spray.  Meds refilled  Orders:  -     Discontinue: fluticasone (FLONASE) 50 MCG/ACT nasal spray; 1 spray into the nostril(s) as directed by provider 2 (Two) Times a Day.  Dispense: 16 g; Refill: 5  -     fluticasone (FLONASE) 50 MCG/ACT nasal spray; 1 spray into the nostril(s) as directed by provider 2 (Two) Times a Day.  Dispense: 16 g; Refill: 5    4. " "Prediabetes  Comments:  Recommend she avoid breads and sweets in diet and start daily exercise to work on diet and weight loss.  Recommend referral to nutritionist and she defers    5. Primary hypertension  Comments:  Very well controlled on current medications.  We will continue current treatment plan  Orders:  -     Discontinue: lisinopril-hydrochlorothiazide (PRINZIDE,ZESTORETIC) 10-12.5 MG per tablet; Take 1 tablet by mouth Daily.  Dispense: 90 tablet; Refill: 1  -     Discontinue: lisinopril-hydrochlorothiazide (PRINZIDE,ZESTORETIC) 10-12.5 MG per tablet; Take 1 tablet by mouth Daily.  Dispense: 14 tablet; Refill: 0  -     lisinopril-hydrochlorothiazide (PRINZIDE,ZESTORETIC) 10-12.5 MG per tablet; Take 1 tablet by mouth Daily.  Dispense: 90 tablet; Refill: 1              Objective     Medications:  Current Outpatient Medications   Medication Instructions    atorvastatin (LIPITOR) 10 mg, Oral, Nightly    B Complex Vitamins (vitamin b complex) capsule capsule 1 capsule, Oral, Daily    fexofenadine (ALLEGRA) 180 mg, Oral, Daily    fluticasone (FLONASE) 50 MCG/ACT nasal spray 1 spray, Nasal, 2 Times Daily    ketotifen (ZADITOR) 0.025 % ophthalmic solution 1 drop, Both Eyes, As Needed    lisinopril-hydrochlorothiazide (PRINZIDE,ZESTORETIC) 10-12.5 MG per tablet 1 tablet, Oral, Daily        Vital Signs:   /72 (BP Location: Right arm, Patient Position: Sitting)   Pulse 70   Ht 160 cm (62.99\")   Wt 83.5 kg (184 lb)   SpO2 97%   BMI 32.60 kg/m²             Physical Exam:  Physical Exam  Vitals and nursing note reviewed.   Constitutional:       General: She is not in acute distress.     Appearance: Normal appearance. She is not ill-appearing, toxic-appearing or diaphoretic.   HENT:      Head: Normocephalic and atraumatic.      Right Ear: Tympanic membrane, ear canal and external ear normal.      Left Ear: Tympanic membrane, ear canal and external ear normal.      Nose: Nose normal. No congestion or rhinorrhea. "      Mouth/Throat:      Pharynx: Oropharynx is clear. No oropharyngeal exudate or posterior oropharyngeal erythema.   Eyes:      Conjunctiva/sclera: Conjunctivae normal.   Cardiovascular:      Rate and Rhythm: Normal rate.      Pulses: Normal pulses.   Pulmonary:      Effort: Pulmonary effort is normal. No respiratory distress.      Breath sounds: Normal breath sounds. No stridor. No wheezing, rhonchi or rales.   Musculoskeletal:         General: Normal range of motion.      Cervical back: Normal range of motion and neck supple. No rigidity.   Lymphadenopathy:      Cervical: No cervical adenopathy.   Skin:     General: Skin is warm and dry.      Capillary Refill: Capillary refill takes less than 2 seconds.   Neurological:      Mental Status: She is alert and oriented to person, place, and time.   Psychiatric:         Mood and Affect: Mood normal.         Behavior: Behavior normal.           Review of Systems:  Review of Systems           Follow Up   Return in about 6 months (around 7/31/2024) for Annual physical, Medicare Wellness.    Part of this note may be an electronic transcription/translation of spoken language to printed   text using the Dragon Dictation System.            Medical History:  Medications Discontinued During This Encounter   Medication Reason    HYDROcodone-acetaminophen (Norco) 7.5-325 MG per tablet *Therapy completed    saccharomyces boulardii (FLORASTOR) 250 MG capsule *Therapy completed    aspirin (Ecotrin) 325 MG EC tablet *Therapy completed    meloxicam (MOBIC) 7.5 MG tablet *Therapy completed    atorvastatin (LIPITOR) 10 MG tablet Reorder    fluticasone (FLONASE) 50 MCG/ACT nasal spray Reorder    lisinopril-hydrochlorothiazide (PRINZIDE,ZESTORETIC) 10-12.5 MG per tablet Reorder    lisinopril-hydrochlorothiazide (PRINZIDE,ZESTORETIC) 10-12.5 MG per tablet Reorder    atorvastatin (LIPITOR) 10 MG tablet Reorder    fluticasone (FLONASE) 50 MCG/ACT nasal spray Reorder     lisinopril-hydrochlorothiazide (PRINZIDE,ZESTORETIC) 10-12.5 MG per tablet Reorder      Past Medical History:    Allergy, unspecified, subsequent encounter    Bilateral primary osteoarthritis of knee    Decreased white blood cell count, unspecified    Essential (primary) hypertension    Full incontinence of feces    Hemorrhoids    History of total right knee replacement (TKR)    Hypercholesteremia    Hypertension    Left shoulder pain    Morbid (severe) obesity due to excess calories    Overweight    Pain involving joints of fingers of both hands    Presence of unspecified artificial knee joint    Pure hypercholesterolemia    Residual hemorrhoidal skin tag    Varicose veins of bilateral lower extremities with pain     Past Surgical History:    APPENDECTOMY    HYSTERECTOMY    with left oophrectomy    JOINT REPLACEMENT    TKR    OVARIAN CYST REMOVAL    TONSILLECTOMY AND ADENOIDECTOMY    TOTAL SHOULDER ARTHROPLASTY W/ DISTAL CLAVICLE EXCISION    Procedure: LEFT TOTAL SHOULDER REVERSE ARTHROPLASTY.;  Surgeon: Arnie Lucas MD;  Location: [facility] MA[facility];  Service: Orthopedics;  Laterality: Left;    TUBAL ABDOMINAL LIGATION    TUMOR REMOVAL    Left thumb      Family History   Problem Relation Age of Onset    Pulmonary embolism Brother     Malpuneet Hyperthermia Neg Hx      Social History     Tobacco Use    Smoking status: Never    Smokeless tobacco: Never   Substance Use Topics    Alcohol use: Never       There are no preventive care reminders to display for this patient.     Immunization History   Administered Date(s) Administered    COVID-19 (PFIZER) BIVALENT 12+YRS 11/07/2022    COVID-19 (PFIZER) Purple Cap Monovalent 02/13/2021, 03/09/2021, 11/08/2021    COVID-19 F23 (MODERNA) 12YRS+ (SPIKEVAX) 10/16/2023    Covid-19 (Pfizer) Gray Cap Monovalent 07/12/2022    Fluzone High Dose =>65 Years (Vaxcare ONLY) 10/04/2017, 10/27/2020    Fluzone High-Dose 65+yrs 10/04/2017, 10/27/2020, 10/05/2021, 10/24/2022, 10/02/2023     Hep A, 2 Dose 06/14/2018    Hepatitis A 06/14/2018    Pneumococcal Conjugate 13-Valent (PCV13) 01/11/2016    Pneumococcal Polysaccharide (PPSV23) 12/06/2013    Shingrix 08/03/2022, 01/05/2023    Td (TDVAX) 02/02/2008, 09/15/2011    Tdap 07/15/2022    Zostavax 06/11/2013       No Known Allergies

## 2024-02-02 DIAGNOSIS — I10 PRIMARY HYPERTENSION: ICD-10-CM

## 2024-02-02 RX ORDER — LISINOPRIL AND HYDROCHLOROTHIAZIDE 12.5; 1 MG/1; MG/1
1 TABLET ORAL DAILY
Qty: 14 TABLET | Refills: 0 | Status: SHIPPED | OUTPATIENT
Start: 2024-02-02

## 2024-02-06 ENCOUNTER — OFFICE VISIT (OUTPATIENT)
Dept: ORTHOPEDIC SURGERY | Facility: CLINIC | Age: 74
End: 2024-02-06
Payer: MEDICARE

## 2024-02-06 VITALS
HEIGHT: 63 IN | SYSTOLIC BLOOD PRESSURE: 149 MMHG | OXYGEN SATURATION: 94 % | HEART RATE: 68 BPM | DIASTOLIC BLOOD PRESSURE: 85 MMHG | WEIGHT: 184.08 LBS | BODY MASS INDEX: 32.62 KG/M2

## 2024-02-06 DIAGNOSIS — M25.512 LEFT SHOULDER PAIN, UNSPECIFIED CHRONICITY: ICD-10-CM

## 2024-02-06 DIAGNOSIS — Z47.89 AFTERCARE FOLLOWING SURGERY OF THE MUSCULOSKELETAL SYSTEM: Primary | ICD-10-CM

## 2024-02-06 NOTE — PROGRESS NOTES
"Chief Complaint  Follow-up of the Left Shoulder    Subjective          History of Present Illness      Simi Dangelo is a 73 y.o. female  presents to [facility] for     Patient presents for follow-up evaluation of left reverse total shoulder arthroplasty.  She states that she is continuing to attend therapy with good range of motion and strength improving.  She states that still hard to raise her arm completely above her head and reach behind her back.  She denies need for pain medication or NSAIDs.  She states she has not been able to lift a 7 pound weight at therapy.      No Known Allergies     Social History     Socioeconomic History    Marital status: Single   Tobacco Use    Smoking status: Never    Smokeless tobacco: Never   Vaping Use    Vaping Use: Never used   Substance and Sexual Activity    Alcohol use: Never    Drug use: Never    Sexual activity: Defer        REVIEW OF SYSTEMS    Constitutional: Awake alert and oriented x3, no acute distress, denies fevers, chills, weight loss  Respiratory: No respiratory distress  Vascular: Brisk cap refill, Intact distal pulses, No cyanosis, compartments soft with no signs or symptoms of compartment syndrome or DVT.   Cardiovascular: Denies chest pain, shortness of breath  Skin: Denies rashes, acute skin changes  Neurologic: Denies headache, loss of consciousness  MSK: Left shoulder pain      Objective   Vital Signs:   /85   Pulse 68   Ht 160 cm (63\")   Wt 83.5 kg (184 lb 1.4 oz)   SpO2 94%   BMI 32.61 kg/m²     Body mass index is 32.61 kg/m².    Physical Exam       Left shoulder: Active forward elevation 140, active abduction 90, external rotation with abduction 65, internal rotation to her buttock, 5 out of 5 strength, elbow wrist hand range of motion intact/appropriate, neurovascular intact      Procedures    Imaging Results (Most Recent)       Procedure Component Value Units Date/Time    XR Scapula Left [[id number]] Resulted: " 02/06/24 1356     Updated: 02/06/24 1356    Narrative:      View:AP/Lateral view(s)  Site: Left shoulder  Indication: Left shoulder pain  Study: X-rays ordered, taken in the office, and reviewed today  X-ray: Intact appearing left reverse total shoulder arthroplasty, no signs   of hardware failure or loosening, no subsidence or periprosthetic   fracture, good alignment  Comparative data: Previous studies             Result Review :   The following data was reviewed by: MIKE Laureano on 02/06/2024:  Data reviewed : Radiologic studies reviewed by me with the patient and her family              Assessment and Plan    Diagnoses and all orders for this visit:    1. Aftercare following surgery of left reverse total shoulder arthroplasty, 11/8/2023 (Primary)  -     Ambulatory Referral to Physical Therapy Evaluate and treat (2-3x/week for 6-8 weeks)    2. Left shoulder pain, unspecified chronicity  -     XR Scapula Left  -     Ambulatory Referral to Physical Therapy Evaluate and treat (2-3x/week for 6-8 weeks)        Reviewed x-rays with the patient or family discussed diagnosis and treatment options with her.  She would like to continue therapy, new order was given, continue work on strength and range of motion, she was told she can discontinue therapy at any time.  Follow-up in 3 months with x-rays    Call or return if worsening symptoms.    Follow Up   Return in about 3 months (around 5/6/2024) for Recheck.  Patient was given instructions and counseling regarding her condition or for health maintenance advice. Please see specific information pulled into the AVS if appropriate.       EMR Dragon/Transcription disclaimer:  Much of this encounter note is an electronic transcription/translation of spoken language to printed text, aka voice recognition.  The electronic translation of spoken language may permit erroneous or at times nonsensical words or phrases to be inadvertently transcribed; although I have  reviewed the note for such errors, some may still exist so please interpret based on surrounding text content.

## 2024-05-06 ENCOUNTER — OFFICE VISIT (OUTPATIENT)
Dept: ORTHOPEDIC SURGERY | Facility: CLINIC | Age: 74
End: 2024-05-06
Payer: COMMERCIAL

## 2024-05-06 VITALS
SYSTOLIC BLOOD PRESSURE: 149 MMHG | WEIGHT: 184.08 LBS | HEIGHT: 63 IN | OXYGEN SATURATION: 97 % | BODY MASS INDEX: 32.62 KG/M2 | HEART RATE: 81 BPM | DIASTOLIC BLOOD PRESSURE: 72 MMHG

## 2024-05-06 DIAGNOSIS — M25.512 LEFT SHOULDER PAIN, UNSPECIFIED CHRONICITY: ICD-10-CM

## 2024-05-06 DIAGNOSIS — Z47.89 AFTERCARE FOLLOWING SURGERY OF THE MUSCULOSKELETAL SYSTEM: Primary | ICD-10-CM

## 2024-05-06 PROCEDURE — 99213 OFFICE O/P EST LOW 20 MIN: CPT | Performed by: PHYSICIAN ASSISTANT

## 2024-07-09 DIAGNOSIS — I10 PRIMARY HYPERTENSION: ICD-10-CM

## 2024-07-09 DIAGNOSIS — E78.00 HYPERCHOLESTEREMIA: ICD-10-CM

## 2024-07-09 RX ORDER — ATORVASTATIN CALCIUM 10 MG/1
10 TABLET, FILM COATED ORAL NIGHTLY
Qty: 90 TABLET | Refills: 0 | Status: SHIPPED | OUTPATIENT
Start: 2024-07-09

## 2024-07-09 RX ORDER — LISINOPRIL AND HYDROCHLOROTHIAZIDE 12.5; 1 MG/1; MG/1
1 TABLET ORAL DAILY
Qty: 90 TABLET | Refills: 0 | Status: SHIPPED | OUTPATIENT
Start: 2024-07-09

## 2024-07-23 ENCOUNTER — TELEPHONE (OUTPATIENT)
Dept: FAMILY MEDICINE CLINIC | Age: 74
End: 2024-07-23
Payer: MEDICARE

## 2024-07-23 NOTE — TELEPHONE ENCOUNTER
Caller: Simi Dangelo    Relationship: Self    Best call back number: [phone] (PATIENT STATED ITS OKAY TO LEAVE VOICE MAIL)     What orders are you requesting (i.e. lab or imaging): BLOOD WORK ORDERS     In what timeframe would the patient need to come in: BEFORE APPOINTMENT ON 07.31.2024

## 2024-07-25 DIAGNOSIS — E78.00 HYPERCHOLESTEREMIA: ICD-10-CM

## 2024-07-25 DIAGNOSIS — R73.03 PREDIABETES: ICD-10-CM

## 2024-07-25 DIAGNOSIS — I10 PRIMARY HYPERTENSION: Primary | ICD-10-CM

## 2024-07-26 ENCOUNTER — LAB (OUTPATIENT)
Dept: LAB | Facility: HOSPITAL | Age: 74
End: 2024-07-26
Payer: MEDICARE

## 2024-07-26 DIAGNOSIS — R73.03 PREDIABETES: ICD-10-CM

## 2024-07-26 DIAGNOSIS — E78.00 HYPERCHOLESTEREMIA: ICD-10-CM

## 2024-07-26 DIAGNOSIS — I10 PRIMARY HYPERTENSION: ICD-10-CM

## 2024-07-26 LAB
ALBUMIN SERPL-MCNC: 3.8 G/DL (ref 3.5–5.2)
ALBUMIN/GLOB SERPL: 1.4 G/DL
ALP SERPL-CCNC: 94 U/L (ref 39–117)
ALT SERPL W P-5'-P-CCNC: 21 U/L (ref 1–33)
ANION GAP SERPL CALCULATED.3IONS-SCNC: 11.6 MMOL/L (ref 5–15)
AST SERPL-CCNC: 24 U/L (ref 1–32)
BILIRUB SERPL-MCNC: 0.7 MG/DL (ref 0–1.2)
BUN SERPL-MCNC: 12 MG/DL (ref 8–23)
BUN/CREAT SERPL: 16.7 (ref 7–25)
CALCIUM SPEC-SCNC: 8.9 MG/DL (ref 8.6–10.5)
CHLORIDE SERPL-SCNC: 103 MMOL/L (ref 98–107)
CHOLEST SERPL-MCNC: 170 MG/DL (ref 0–200)
CO2 SERPL-SCNC: 24.4 MMOL/L (ref 22–29)
CREAT SERPL-MCNC: 0.72 MG/DL (ref 0.57–1)
DEPRECATED RDW RBC AUTO: 48.8 FL (ref 37–54)
EGFRCR SERPLBLD CKD-EPI 2021: 86.8 ML/MIN/1.73
ERYTHROCYTE [DISTWIDTH] IN BLOOD BY AUTOMATED COUNT: 15.3 % (ref 12.3–15.4)
GLOBULIN UR ELPH-MCNC: 2.8 GM/DL
GLUCOSE SERPL-MCNC: 96 MG/DL (ref 65–99)
HBA1C MFR BLD: 6.2 % (ref 4.8–5.6)
HCT VFR BLD AUTO: 36.3 % (ref 34–46.6)
HDLC SERPL-MCNC: 56 MG/DL (ref 40–60)
HGB BLD-MCNC: 11.5 G/DL (ref 12–15.9)
LDLC SERPL CALC-MCNC: 101 MG/DL (ref 0–100)
LDLC/HDLC SERPL: 1.79 {RATIO}
MCH RBC QN AUTO: 27.1 PG (ref 26.6–33)
MCHC RBC AUTO-ENTMCNC: 31.7 G/DL (ref 31.5–35.7)
MCV RBC AUTO: 85.6 FL (ref 79–97)
PLATELET # BLD AUTO: 231 10*3/MM3 (ref 140–450)
PMV BLD AUTO: 9.4 FL (ref 6–12)
POTASSIUM SERPL-SCNC: 3.8 MMOL/L (ref 3.5–5.2)
PROT SERPL-MCNC: 6.6 G/DL (ref 6–8.5)
RBC # BLD AUTO: 4.24 10*6/MM3 (ref 3.77–5.28)
SODIUM SERPL-SCNC: 139 MMOL/L (ref 136–145)
TRIGL SERPL-MCNC: 70 MG/DL (ref 0–150)
VLDLC SERPL-MCNC: 13 MG/DL (ref 5–40)
WBC NRBC COR # BLD AUTO: 4.06 10*3/MM3 (ref 3.4–10.8)

## 2024-07-26 PROCEDURE — 83036 HEMOGLOBIN GLYCOSYLATED A1C: CPT

## 2024-07-26 PROCEDURE — 36415 COLL VENOUS BLD VENIPUNCTURE: CPT

## 2024-07-26 PROCEDURE — 80061 LIPID PANEL: CPT

## 2024-07-26 PROCEDURE — 80053 COMPREHEN METABOLIC PANEL: CPT

## 2024-07-26 PROCEDURE — 85027 COMPLETE CBC AUTOMATED: CPT

## 2024-07-31 ENCOUNTER — OFFICE VISIT (OUTPATIENT)
Dept: FAMILY MEDICINE CLINIC | Age: 74
End: 2024-07-31
Payer: MEDICARE

## 2024-07-31 VITALS
HEART RATE: 73 BPM | SYSTOLIC BLOOD PRESSURE: 111 MMHG | BODY MASS INDEX: 33.35 KG/M2 | DIASTOLIC BLOOD PRESSURE: 58 MMHG | TEMPERATURE: 98.7 F | HEIGHT: 63 IN | OXYGEN SATURATION: 98 % | WEIGHT: 188.2 LBS

## 2024-07-31 DIAGNOSIS — Z12.11 ENCOUNTER FOR SCREENING COLONOSCOPY: ICD-10-CM

## 2024-07-31 DIAGNOSIS — Z78.0 MENOPAUSE: ICD-10-CM

## 2024-07-31 DIAGNOSIS — E66.09 CLASS 1 OBESITY DUE TO EXCESS CALORIES WITH SERIOUS COMORBIDITY AND BODY MASS INDEX (BMI) OF 33.0 TO 33.9 IN ADULT: ICD-10-CM

## 2024-07-31 DIAGNOSIS — Z23 ENCOUNTER FOR IMMUNIZATION: ICD-10-CM

## 2024-07-31 DIAGNOSIS — Z00.00 ENCOUNTER FOR ANNUAL WELLNESS EXAM IN MEDICARE PATIENT: Primary | ICD-10-CM

## 2024-07-31 DIAGNOSIS — I10 PRIMARY HYPERTENSION: ICD-10-CM

## 2024-07-31 DIAGNOSIS — Z12.31 SCREENING MAMMOGRAM FOR BREAST CANCER: ICD-10-CM

## 2024-07-31 DIAGNOSIS — Z12.11 COLON CANCER SCREENING: ICD-10-CM

## 2024-07-31 DIAGNOSIS — H61.23 BILATERAL IMPACTED CERUMEN: ICD-10-CM

## 2024-07-31 DIAGNOSIS — E78.00 HYPERCHOLESTEREMIA: ICD-10-CM

## 2024-07-31 PROBLEM — M19.012 ARTHRITIS OF LEFT SHOULDER REGION: Status: RESOLVED | Noted: 2023-10-05 | Resolved: 2024-07-31

## 2024-07-31 PROBLEM — Z47.89 AFTERCARE FOLLOWING SURGERY OF THE MUSCULOSKELETAL SYSTEM: Status: RESOLVED | Noted: 2023-11-21 | Resolved: 2024-07-31

## 2024-07-31 NOTE — PROGRESS NOTES
The ABCs of the Annual Wellness Visit  Medicare Wellness Visit    Subjective    Simi Dangelo is a 74 y.o. female who presents for a Medicare Wellness Visit.    The following portions of the patient's history were reviewed and   updated as appropriate: allergies, current medications, past family history, past medical history, past social history, past surgical history, and problem list.    Compared to one year ago, the patient feels her physical   health is better since she had her reverse L shoulder replacement    Compared to one year ago, the patient feels her mental   health is better.    Recent Hospitalizations:  She was admitted within the past 365 days at [facility].  She had her reverse L shoulder replacement      Advance Care Planning  Advance Directive is not on file.  ACP discussion was held with the patient during this visit. Patient has an advance directive (not in EMR), copy requested.    Does the patient have evidence of cognitive impairment? No    Aspirin is not on active medication list.  Aspirin use is not indicated based on review of current medical condition/s. Risk of harm outweighs potential benefits.  .    Patient Active Problem List   Diagnosis    Hypertension    Hypercholesteremia    Encounter for annual wellness exam in Medicare patient    Primary osteoarthritis of left shoulder    Tear of left rotator cuff       Current Medical Providers:  Patient Care Team:  Shanika Crawford MD as PCP - General (Family Medicine)  Walt Jama OD (Optometry)  Luis Jay DO (Orthopedic Surgery)    No opioid medication identified on active medication list. I have reviewed chart for other potential  high risk medication/s and harmful drug interactions in the elderly.             Objective    Vitals:    07/31/24 1048   BP: 111/58   BP Location: Right arm   Patient Position: Sitting   Cuff Size: Large Adult   Pulse: 73   Temp: 98.7 °F (37.1 °C)   TempSrc: Oral   SpO2: 98%   Weight: 85.4  "kg (188 lb 3.2 oz)   Height: 160 cm (62.99\")     Estimated body mass index is 33.35 kg/m² as calculated from the following:    Height as of this encounter: 160 cm (62.99\").    Weight as of this encounter: 85.4 kg (188 lb 3.2 oz).    BMI is >= 30 and <35. (Class 1 Obesity). The following options were offered after discussion;: exercise counseling/recommendations and nutrition counseling/recommendations      Gait and Balance Evaluation: Normal    Lab Results   Component Value Date    TRIG 70 2024    HDL 56 2024     (H) 2024    VLDL 13 2024    HGBA1C 6.20 (H) 2024        HEALTH RISK ASSESSMENT    Smoking Status:  Social History     Tobacco Use   Smoking Status Never   Smokeless Tobacco Never     Alcohol Consumption:  Social History     Substance and Sexual Activity   Alcohol Use Never     Fall Risk Screen:    STEADI Fall Risk Assessment was completed, and patient is at LOW risk for falls.Assessment completed on:2024    Depression Screenin/31/2024    10:54 AM   PHQ-2/PHQ-9 Depression Screening   Little Interest or Pleasure in Doing Things 0-->not at all   Feeling Down, Depressed or Hopeless 0-->not at all   PHQ-9: Brief Depression Severity Measure Score 0       Health Habits and Functional and Cognitive Screenin/31/2024    10:54 AM   Functional & Cognitive Status   Do you have difficulty preparing food and eating? No   Do you have difficulty bathing yourself, getting dressed or grooming yourself? No   Do you have difficulty using the toilet? No   Do you have difficulty moving around from place to place? No   Do you have trouble with steps or getting out of a bed or a chair? No   Current Diet Unhealthy Diet   Dental Exam Up to date   Eye Exam Up to date   Exercise (times per week) 0 times per week   Current Exercises Include No Regular Exercise;Yard Work;Walking   Do you need help using the phone?  No   Are you deaf or do you have serious difficulty hearing?  No "   Do you need help to go to places out of walking distance? No   Do you need help shopping? No   Do you need help preparing meals?  No   Do you need help with housework?  No   Do you need help with laundry? No   Do you need help taking your medications? No   Do you need help managing money? No   Do you ever drive or ride in a car without wearing a seat belt? No   Have you felt unusual stress, anger or loneliness in the last month? No   Who do you live with? Alone   If you need help, do you have trouble finding someone available to you? No   Have you been bothered in the last four weeks by sexual problems? No   Do you have difficulty concentrating, remembering or making decisions? No       Visual Acuity:             Age-appropriate Screening Schedule:  Refer to the list below for future screening recommendations based on patient's age, sex and/or medical conditions. Orders for these recommended tests are listed in the plan section. The patient has been provided with a written plan.    Health Maintenance   Topic Date Due    DXA SCAN  07/18/2024    COVID-19 Vaccine (7 - 2023-24 season) 10/15/2024 (Originally 2/16/2024)    RSV Vaccine - Adults (1 - 1-dose 60+ series) 07/31/2025 (Originally 4/14/2008)    INFLUENZA VACCINE  08/01/2024    LIPID PANEL  07/26/2025    ANNUAL WELLNESS VISIT  07/31/2025    BMI FOLLOWUP  07/31/2025    TDAP/TD VACCINES (4 - Td or Tdap) 07/15/2032    HEPATITIS C SCREENING  Completed    Pneumococcal Vaccine 65+  Completed    ZOSTER VACCINE  Completed    COLORECTAL CANCER SCREENING  Discontinued              Outpatient Medications Prior to Visit   Medication Sig Dispense Refill    atorvastatin (LIPITOR) 10 MG tablet TAKE 1 TABLET EVERY NIGHT 90 tablet 0    B Complex Vitamins (vitamin b complex) capsule capsule Take 1 capsule by mouth Daily.      fexofenadine (ALLEGRA) 180 MG tablet Take 1 tablet by mouth Daily. 90 tablet 1    fluticasone (FLONASE) 50 MCG/ACT nasal spray 1 spray into the nostril(s)  as directed by provider 2 (Two) Times a Day. 16 g 5    ketotifen (ZADITOR) 0.025 % ophthalmic solution Administer 1 drop to both eyes As Needed.      lisinopril-hydrochlorothiazide (PRINZIDE,ZESTORETIC) 10-12.5 MG per tablet TAKE 1 TABLET DAILY 90 tablet 0     No facility-administered medications prior to visit.       CMS Preventative Services Quick Reference  Risk Factors Identified During Encounter  Immunizations Discussed/Encouraged: Influenza, COVID19, and RSV (Respiratory Syncytial Virus)  Inactivity/Sedentary: Patient was advised to exercise at least 150 minutes a week per CDC recommendations.  Dental Screening Recommended  Vision Screening Recommended  The above risks/problems have been discussed with the patient.  Pertinent information has been shared with the patient in the After Visit Summary.  An After Visit Summary and PPPS were made available to the patient.    Follow Up:   Next Medicare Wellness visit to be scheduled in 1 year.       Additional E&M Note during same encounter follows:  Patient has multiple medical problems which are significant and separately identifiable that require additional work above and beyond the Medicare Wellness Visit.         Simi Dangelo presents to [facility].    Chief Complaint: cholesterol follow up        Subjective   History of Present Illness:  HPI    She is status post reverse shoulder replacement of the left shoulder with Dr Lucas, she is stable and doing well.     She presents with chronic left knee pain, she does see orthopedic for intermittent steroid joint injections.  She is now having no acute issues at this time     She presents  with pure hypercholesterolemia; current treatment includes Lipitor 10 mg daily and a low cholesterol/low fat diet.  Compliance with treatment has been good; she maintains her low cholesterol diet.           She also presents with essential (primary) hypertension, denies using any nonpharmacologic  "treatment modalities, recommend low-sodium diet.  Her current medication regimen includes a diuretic HCTZ and an ACE inhibitor lisinopril, she tolerates meds well.  Compliance with treatment has been good; she takes her medication as directed, maintains her diet and exercise regimen, and follows up as directed.  Ms. Dangelo checks her blood pressure at home intermittently     She has chronic allergies are controlled on allegra 180 mg daily, h/o allergy shots but she does not need these anymore      Borderline anemia.  She is stable and doing quite well on B12 supplementation, follow-up hematocrit prior to her surgery was normal range          Result Review   The following data was reviewed by Shanika Crawford MD on 07/31/2024.  Lab Results   Component Value Date    WBC 4.06 07/26/2024    HGB 11.5 (L) 07/26/2024    HCT 36.3 07/26/2024    MCV 85.6 07/26/2024     07/26/2024     Lab Results   Component Value Date    GLUCOSE 96 07/26/2024    BUN 12 07/26/2024    CREATININE 0.72 07/26/2024    EGFR 86.8 07/26/2024    BCR 16.7 07/26/2024    K 3.8 07/26/2024    CO2 24.4 07/26/2024    CALCIUM 8.9 07/26/2024    ALBUMIN 3.8 07/26/2024    BILITOT 0.7 07/26/2024    AST 24 07/26/2024    ALT 21 07/26/2024     Lab Results   Component Value Date    CHOL 170 07/26/2024    CHLPL 178 12/14/2020    TRIG 70 07/26/2024    HDL 56 07/26/2024     (H) 07/26/2024     No results found for: \"TSH\"  Lab Results   Component Value Date    HGBA1C 6.20 (H) 07/26/2024     No results found for: \"PSA\"      No results found for: \"GYCM96BT\"          Assessment and Plan:   Diagnoses and all orders for this visit:    1. Encounter for annual wellness exam in Medicare patient (Primary)    2. Screening mammogram for breast cancer  Comments:  Breast exam WNL.  She is due for screening mammogram and order placed  Orders:  -     Mammo Screening Digital Tomosynthesis Bilateral With CAD; Future    3. Encounter for immunization  Comments:  Recommend flu " vaccine and COVID booster    4. Encounter for screening colonoscopy  Comments:  Order placed for screening colonoscopy  Orders:  -     Ambulatory Referral For Screening Colonoscopy    5. Menopause  Comments:  Order placed for screening DEXA  Orders:  -     DEXA Bone Density Axial; Future    6. Colon cancer screening    7. Hypercholesteremia  Comments:  Well-controlled on atorvastatin.  Will check labs and adjust Tx plan pending results.  Continue low-cholesterol diet    8. Primary hypertension  Comments:  Stable and well-controlled on lisinopril with HCTZ, tolerates meds well.  Recommend low NA diet.  No changes in current meds/Tx plan    9. Bilateral impacted cerumen  Comments:  Status post irrigation, she tolerated well  Orders:  -     Ear Cerumen Removal    10. Class 1 obesity due to excess calories with serious comorbidity and body mass index (BMI) of 33.0 to 33.9 in adult  Comments:  Recommend healthy diet and daily exercise.  She defers referral to dietitian at this time                    Medications:      Current Outpatient Medications:     atorvastatin (LIPITOR) 10 MG tablet, TAKE 1 TABLET EVERY NIGHT, Disp: 90 tablet, Rfl: 0    B Complex Vitamins (vitamin b complex) capsule capsule, Take 1 capsule by mouth Daily., Disp: , Rfl:     fexofenadine (ALLEGRA) 180 MG tablet, Take 1 tablet by mouth Daily., Disp: 90 tablet, Rfl: 1    fluticasone (FLONASE) 50 MCG/ACT nasal spray, 1 spray into the nostril(s) as directed by provider 2 (Two) Times a Day., Disp: 16 g, Rfl: 5    ketotifen (ZADITOR) 0.025 % ophthalmic solution, Administer 1 drop to both eyes As Needed., Disp: , Rfl:     lisinopril-hydrochlorothiazide (PRINZIDE,ZESTORETIC) 10-12.5 MG per tablet, TAKE 1 TABLET DAILY, Disp: 90 tablet, Rfl: 0    RSVPreF3 Vac Recomb Adjuvanted (Arexvy) 120 MCG/0.5ML reconstituted suspension injection, Inject  into the appropriate muscle as directed by prescriber., Disp: 1 each, Rfl: 0       Objective   Vital Signs:   /58  "(BP Location: Right arm, Patient Position: Sitting, Cuff Size: Large Adult)   Pulse 73   Temp 98.7 °F (37.1 °C) (Oral)   Ht 160 cm (62.99\")   Wt 85.4 kg (188 lb 3.2 oz)   SpO2 98%   BMI 33.35 kg/m²       Physical Exam:  Physical Exam  Vitals and nursing note reviewed.   Constitutional:       General: She is not in acute distress.     Appearance: Normal appearance. She is not ill-appearing, toxic-appearing or diaphoretic.   HENT:      Head: Normocephalic and atraumatic.      Right Ear: Tympanic membrane, ear canal and external ear normal. There is impacted cerumen.      Left Ear: Tympanic membrane, ear canal and external ear normal. There is impacted cerumen.      Nose: No congestion or rhinorrhea.      Mouth/Throat:      Mouth: Mucous membranes are moist.      Pharynx: Oropharynx is clear. No oropharyngeal exudate or posterior oropharyngeal erythema.   Eyes:      Extraocular Movements: Extraocular movements intact.      Conjunctiva/sclera: Conjunctivae normal.      Pupils: Pupils are equal, round, and reactive to light.   Cardiovascular:      Rate and Rhythm: Normal rate and regular rhythm.      Heart sounds: Normal heart sounds.   Pulmonary:      Effort: Pulmonary effort is normal.      Breath sounds: Normal breath sounds. No wheezing, rhonchi or rales.   Chest:   Breasts:     Right: Normal.      Left: Normal.   Abdominal:      General: Abdomen is flat.      Palpations: Abdomen is soft. There is no mass.      Tenderness: There is no abdominal tenderness.      Hernia: No hernia is present.   Musculoskeletal:      Cervical back: Neck supple. No rigidity.      Right lower leg: No edema.      Left lower leg: No edema.   Lymphadenopathy:      Cervical: No cervical adenopathy.   Skin:     General: Skin is warm and dry.   Neurological:      General: No focal deficit present.      Mental Status: She is alert and oriented to person, place, and time. Mental status is at baseline.   Psychiatric:         Mood and Affect: " Mood normal.         Behavior: Behavior normal.         Thought Content: Thought content normal.         Judgment: Judgment normal.                     Review of Systems:  Review of Systems   Constitutional:  Negative for chills, fatigue and fever.   HENT:  Negative for ear pain, sinus pressure and sore throat.    Eyes:  Negative for blurred vision and double vision.   Respiratory:  Negative for cough, shortness of breath and wheezing.    Cardiovascular:  Negative for chest pain, palpitations and leg swelling.   Gastrointestinal:  Negative for abdominal pain, blood in stool, constipation, diarrhea, nausea and vomiting.   Skin:  Negative for rash.   Neurological:  Negative for dizziness and headache.   Psychiatric/Behavioral:  Negative for sleep disturbance, suicidal ideas and depressed mood. The patient is not nervous/anxious.             Follow Up   Return in about 6 months (around 1/31/2025) for Recheck.    Part of this note may be an electronic transcription/translation of spoken language to printed   text using the Dragon Dictation System.            Medical History:  Past Medical History:    Allergy, unspecified, subsequent encounter    Bilateral primary osteoarthritis of knee    Decreased white blood cell count, unspecified    Essential (primary) hypertension    Full incontinence of feces    Hemorrhoids    History of total right knee replacement (TKR)    Hypercholesteremia    Hypertension    Left shoulder pain    Morbid (severe) obesity due to excess calories    Overweight    Pain involving joints of fingers of both hands    Presence of unspecified artificial knee joint    Pure hypercholesterolemia    Residual hemorrhoidal skin tag    Varicose veins of bilateral lower extremities with pain     Past Surgical History:    APPENDECTOMY    HYSTERECTOMY    with left oophrectomy    JOINT REPLACEMENT    TKR    OVARIAN CYST REMOVAL    TONSILLECTOMY AND ADENOIDECTOMY    TOTAL SHOULDER ARTHROPLASTY W/ DISTAL CLAVICLE  EXCISION    Procedure: LEFT TOTAL SHOULDER REVERSE ARTHROPLASTY.;  Surgeon: Arnie Lucas MD;  Location: [facility] MAIN OR;  Service: Orthopedics;  Laterality: Left;    TUBAL ABDOMINAL LIGATION    TUMOR REMOVAL    Left thumb      Family History   Problem Relation Age of Onset    Pulmonary embolism Brother     Lalito Hyperthermia Neg Hx      Social History     Tobacco Use    Smoking status: Never    Smokeless tobacco: Never   Substance Use Topics    Alcohol use: Never       Health Maintenance Due   Topic Date Due    DXA SCAN  07/18/2024        Immunization History   Administered Date(s) Administered    COVID-19 (PFIZER) BIVALENT 12+YRS 11/07/2022    COVID-19 (PFIZER) Purple Cap Monovalent 02/13/2021, 03/09/2021, 11/08/2021    COVID-19 F23 (MODERNA) 12YRS+ (SPIKEVAX) 10/16/2023    Covid-19 (Pfizer) Gray Cap Monovalent 07/12/2022    Fluzone High Dose =>65 Years (Vaxcare ONLY) 10/04/2017, 10/27/2020    Fluzone High-Dose 65+yrs 10/04/2017, 10/27/2020, 10/05/2021, 10/24/2022, 10/02/2023    Hep A, 2 Dose 06/14/2018    Hepatitis A 06/14/2018    Pneumococcal Conjugate 13-Valent (PCV13) 01/11/2016    Pneumococcal Polysaccharide (PPSV23) 12/06/2013    Shingrix 08/03/2022, 01/05/2023    Td (TDVAX) 02/02/2008, 09/15/2011    Tdap 07/15/2022    Zostavax 06/11/2013       No Known Allergies

## 2024-07-31 NOTE — PROGRESS NOTES
Ear Cerumen Removal    Date/Time: 7/31/2024 12:11 PM    Performed by: Leslie Jara MA  Authorized by: Shanika Crawford MD    Anesthesia:  Local Anesthetic: none  Location details: right ear and left ear  Patient tolerance: patient tolerated the procedure well with no immediate complications  Comments: Both right and left ear where cleaned out, ear drum is visible.   Procedure type: irrigation   Sedation:  Patient sedated: no

## 2024-09-09 ENCOUNTER — HOSPITAL ENCOUNTER (OUTPATIENT)
Dept: MAMMOGRAPHY | Facility: HOSPITAL | Age: 74
Discharge: HOME | End: 2024-09-09
Payer: MEDICARE

## 2024-09-09 ENCOUNTER — HOSPITAL ENCOUNTER (OUTPATIENT)
Dept: BONE DENSITY | Facility: HOSPITAL | Age: 74
Discharge: HOME | End: 2024-09-09
Payer: MEDICARE

## 2024-09-09 DIAGNOSIS — Z12.31 SCREENING MAMMOGRAM FOR BREAST CANCER: ICD-10-CM

## 2024-09-09 DIAGNOSIS — Z78.0 MENOPAUSE: ICD-10-CM

## 2024-09-09 PROCEDURE — 77080 DXA BONE DENSITY AXIAL: CPT

## 2024-09-09 PROCEDURE — 77063 BREAST TOMOSYNTHESIS BI: CPT

## 2024-09-09 PROCEDURE — 77067 SCR MAMMO BI INCL CAD: CPT

## 2024-10-07 DIAGNOSIS — I10 PRIMARY HYPERTENSION: ICD-10-CM

## 2024-10-07 DIAGNOSIS — E78.00 HYPERCHOLESTEREMIA: ICD-10-CM

## 2024-10-07 RX ORDER — LISINOPRIL/HYDROCHLOROTHIAZIDE 10-12.5 MG
1 TABLET ORAL DAILY
Qty: 90 TABLET | Refills: 0 | Status: SHIPPED | OUTPATIENT
Start: 2024-10-07

## 2024-10-07 RX ORDER — ATORVASTATIN CALCIUM 10 MG/1
10 TABLET, FILM COATED ORAL NIGHTLY
Qty: 90 TABLET | Refills: 0 | Status: SHIPPED | OUTPATIENT
Start: 2024-10-07

## 2024-10-14 ENCOUNTER — CLINICAL SUPPORT (OUTPATIENT)
Dept: FAMILY MEDICINE CLINIC | Age: 74
End: 2024-10-14
Payer: MEDICARE

## 2024-10-14 DIAGNOSIS — Z23 IMMUNIZATION DUE: Primary | ICD-10-CM

## 2024-10-14 PROCEDURE — 90480 ADMN SARSCOV2 VAC 1/ONLY CMP: CPT | Performed by: FAMILY MEDICINE

## 2024-10-14 PROCEDURE — 90662 IIV NO PRSV INCREASED AG IM: CPT | Performed by: FAMILY MEDICINE

## 2024-10-14 PROCEDURE — 91320 SARSCV2 VAC 30MCG TRS-SUC IM: CPT | Performed by: FAMILY MEDICINE

## 2024-10-14 PROCEDURE — G0008 ADMIN INFLUENZA VIRUS VAC: HCPCS | Performed by: FAMILY MEDICINE

## 2024-11-11 ENCOUNTER — OFFICE VISIT (OUTPATIENT)
Dept: ORTHOPEDIC SURGERY | Facility: CLINIC | Age: 74
End: 2024-11-11
Payer: MEDICARE

## 2024-11-11 VITALS
HEART RATE: 67 BPM | OXYGEN SATURATION: 95 % | BODY MASS INDEX: 32.78 KG/M2 | HEIGHT: 63 IN | SYSTOLIC BLOOD PRESSURE: 134 MMHG | WEIGHT: 185 LBS | DIASTOLIC BLOOD PRESSURE: 78 MMHG

## 2024-11-11 DIAGNOSIS — Z47.89 AFTERCARE FOLLOWING SURGERY OF THE MUSCULOSKELETAL SYSTEM: Primary | ICD-10-CM

## 2024-11-11 RX ORDER — AMOXICILLIN 500 MG/1
CAPSULE ORAL
Qty: 4 CAPSULE | Refills: 0 | Status: SHIPPED | OUTPATIENT
Start: 2024-11-11

## 2024-11-11 NOTE — PROGRESS NOTES
"Chief Complaint  Follow-up of the Left Shoulder    Subjective          History of Present Illness      Simi Dangelo is a 74 y.o. female  presents to [facility] for     Patient presents for follow-up evaluation of left reverse total shoulder arthroplasty, 11/8/2023.  Patient states her shoulder has been doing well she sleeps on her side and states it aches at night sometimes.  She feels she is able to use it with activities of daily living without trouble.  She denies stiffness denies need for pain medication or NSAIDs.  She is happy with her progress.  She states it does not ache like it did prior to her surgery.      No Known Allergies     Social History     Socioeconomic History    Marital status: Single   Tobacco Use    Smoking status: Never    Smokeless tobacco: Never   Vaping Use    Vaping status: Never Used   Substance and Sexual Activity    Alcohol use: Never    Drug use: Never    Sexual activity: Defer        REVIEW OF SYSTEMS    Constitutional: Awake alert and oriented x3, no acute distress, denies fevers, chills, weight loss  Respiratory: No respiratory distress  Vascular: Brisk cap refill, Intact distal pulses, No cyanosis, compartments soft with no signs or symptoms of compartment syndrome or DVT.   Cardiovascular: Denies chest pain, shortness of breath  Skin: Denies rashes, acute skin changes  Neurologic: Denies headache, loss of consciousness  MSK: Left shoulder pain      Objective   Vital Signs:   /78   Pulse 67   Ht 160 cm (62.99\")   Wt 83.9 kg (185 lb)   SpO2 95%   BMI 32.78 kg/m²     Body mass index is 32.78 kg/m².    Physical Exam       Left shoulder: Nontender to palpation, no pain with range of motion, 5 out of 5 strength, active forward elevation 160 active abduction 100 external rotation with abduction 80 internal rotation to her buttock, elbow wrist hand range of motion intact/appropriate.      Procedures    Imaging Results (Most Recent)       Procedure " Component Value Units Date/Time    XR scapula left [[id number]] Resulted: 11/11/24 1333     Updated: 11/11/24 1333    Narrative:      View:AP/Lateral view(s)  Site: Left shoulder  Indication: Left shoulder pain  Study: X-rays ordered, taken in the office, and reviewed today  X-ray: Intact appearing left reverse total shoulder arthroplasty, no signs   of hardware failure or loosening, no subsidence or periprosthetic   fracture, good alignment  Comparative data: Previous studies             Result Review :   The following data was reviewed by: MIKE Laureano on 11/11/2024:  Data reviewed : Radiologic studies reviewed by me with the patient              Assessment and Plan    Diagnoses and all orders for this visit:    1. Aftercare following surgery of left reverse total shoulder arthroplasty, 11/8/2023 (Primary)  -     XR scapula left    Other orders  -     amoxicillin (AMOXIL) 500 MG capsule; Take 2 grams amoxicillin orally one hour before treatment/procedure  Dispense: 4 capsule; Refill: 0        Reviewed x-rays with the patient, discussed diagnosis and treatment options with her, she was advised to continue activity as tolerated, follow-up in 1 year for the shoulder.  She states she is going to the dentist tomorrow and is requested antibiotic prescription for prophylaxis.  X-ray at next visit    Call or return if worsening symptoms.    Follow Up   Return in about 1 year (around 11/11/2025) for Recheck.  Patient was given instructions and counseling regarding her condition or for health maintenance advice. Please see specific information pulled into the AVS if appropriate.       EMR Dragon/Transcription disclaimer:  Part of this note may be an electronic transcription/translation of spoken language to printed text using the Dragon Dictation System

## 2025-01-06 DIAGNOSIS — E78.00 HYPERCHOLESTEREMIA: ICD-10-CM

## 2025-01-06 DIAGNOSIS — I10 PRIMARY HYPERTENSION: ICD-10-CM

## 2025-01-07 RX ORDER — ATORVASTATIN CALCIUM 10 MG/1
10 TABLET, FILM COATED ORAL NIGHTLY
Qty: 90 TABLET | Refills: 0 | Status: SHIPPED | OUTPATIENT
Start: 2025-01-07

## 2025-01-07 RX ORDER — LISINOPRIL AND HYDROCHLOROTHIAZIDE 10; 12.5 MG/1; MG/1
1 TABLET ORAL DAILY
Qty: 90 TABLET | Refills: 0 | Status: SHIPPED | OUTPATIENT
Start: 2025-01-07

## 2025-01-23 ENCOUNTER — TELEPHONE (OUTPATIENT)
Dept: FAMILY MEDICINE CLINIC | Age: 75
End: 2025-01-23
Payer: MEDICARE

## 2025-01-23 NOTE — TELEPHONE ENCOUNTER
Caller: Smii Dangelo    Relationship: Self    Best call back number: [phone]     What orders are you requesting (i.e. lab or imaging): LAB ORDERS FOR UPCOMING APPOINTMENT ON 02/03 - 6 MONTH FOLLOW UP    In what timeframe would the patient need to come in: NEXT WEEK  01/27    Where will you receive your lab/imaging services: [facility]    Additional notes: PLEASE TEXT OR CALL PATIENT WHEN ORDERS HAVE BEEN PLACED

## 2025-01-26 DIAGNOSIS — I10 PRIMARY HYPERTENSION: ICD-10-CM

## 2025-01-26 DIAGNOSIS — E78.00 HYPERCHOLESTEREMIA: Primary | ICD-10-CM

## 2025-01-26 DIAGNOSIS — R73.03 PREDIABETES: ICD-10-CM

## 2025-02-03 ENCOUNTER — LAB (OUTPATIENT)
Dept: LAB | Facility: HOSPITAL | Age: 75
End: 2025-02-03
Payer: MEDICARE

## 2025-02-03 ENCOUNTER — OFFICE VISIT (OUTPATIENT)
Dept: FAMILY MEDICINE CLINIC | Age: 75
End: 2025-02-03
Payer: MEDICARE

## 2025-02-03 VITALS
BODY MASS INDEX: 32.43 KG/M2 | DIASTOLIC BLOOD PRESSURE: 66 MMHG | HEIGHT: 63 IN | SYSTOLIC BLOOD PRESSURE: 134 MMHG | OXYGEN SATURATION: 99 % | HEART RATE: 69 BPM | TEMPERATURE: 97.8 F | WEIGHT: 183 LBS

## 2025-02-03 DIAGNOSIS — D64.9 ANEMIA, UNSPECIFIED TYPE: ICD-10-CM

## 2025-02-03 DIAGNOSIS — I10 PRIMARY HYPERTENSION: ICD-10-CM

## 2025-02-03 DIAGNOSIS — R73.03 PREDIABETES: ICD-10-CM

## 2025-02-03 DIAGNOSIS — E55.9 VITAMIN D DEFICIENCY: ICD-10-CM

## 2025-02-03 DIAGNOSIS — E78.00 HYPERCHOLESTEREMIA: ICD-10-CM

## 2025-02-03 DIAGNOSIS — J30.1 ALLERGIC RHINITIS DUE TO POLLEN, UNSPECIFIED SEASONALITY: ICD-10-CM

## 2025-02-03 DIAGNOSIS — E66.09 CLASS 1 OBESITY DUE TO EXCESS CALORIES WITH SERIOUS COMORBIDITY AND BODY MASS INDEX (BMI) OF 32.0 TO 32.9 IN ADULT: ICD-10-CM

## 2025-02-03 DIAGNOSIS — E66.811 CLASS 1 OBESITY DUE TO EXCESS CALORIES WITH SERIOUS COMORBIDITY AND BODY MASS INDEX (BMI) OF 32.0 TO 32.9 IN ADULT: ICD-10-CM

## 2025-02-03 DIAGNOSIS — I10 PRIMARY HYPERTENSION: Primary | ICD-10-CM

## 2025-02-03 LAB
25(OH)D3 SERPL-MCNC: 7.7 NG/ML (ref 30–100)
ALBUMIN SERPL-MCNC: 4.2 G/DL (ref 3.5–5.2)
ALBUMIN/GLOB SERPL: 1.4 G/DL
ALP SERPL-CCNC: 102 U/L (ref 39–117)
ALT SERPL W P-5'-P-CCNC: 18 U/L (ref 1–33)
ANION GAP SERPL CALCULATED.3IONS-SCNC: 8 MMOL/L (ref 5–15)
AST SERPL-CCNC: 22 U/L (ref 1–32)
BILIRUB SERPL-MCNC: 0.8 MG/DL (ref 0–1.2)
BUN SERPL-MCNC: 10 MG/DL (ref 8–23)
BUN/CREAT SERPL: 13.2 (ref 7–25)
CALCIUM SPEC-SCNC: 9.6 MG/DL (ref 8.6–10.5)
CHLORIDE SERPL-SCNC: 100 MMOL/L (ref 98–107)
CHOLEST SERPL-MCNC: 169 MG/DL (ref 0–200)
CO2 SERPL-SCNC: 29 MMOL/L (ref 22–29)
CREAT SERPL-MCNC: 0.76 MG/DL (ref 0.57–1)
DEPRECATED RDW RBC AUTO: 45.2 FL (ref 37–54)
EGFRCR SERPLBLD CKD-EPI 2021: 81.3 ML/MIN/1.73
ERYTHROCYTE [DISTWIDTH] IN BLOOD BY AUTOMATED COUNT: 14.3 % (ref 12.3–15.4)
GLOBULIN UR ELPH-MCNC: 3 GM/DL
GLUCOSE SERPL-MCNC: 96 MG/DL (ref 65–99)
HBA1C MFR BLD: 6.2 % (ref 4.8–5.6)
HCT VFR BLD AUTO: 40.2 % (ref 34–46.6)
HDLC SERPL-MCNC: 63 MG/DL (ref 40–60)
HGB BLD-MCNC: 12.8 G/DL (ref 12–15.9)
LDLC SERPL CALC-MCNC: 92 MG/DL (ref 0–100)
LDLC/HDLC SERPL: 1.46 {RATIO}
MCH RBC QN AUTO: 26.9 PG (ref 26.6–33)
MCHC RBC AUTO-ENTMCNC: 31.8 G/DL (ref 31.5–35.7)
MCV RBC AUTO: 84.6 FL (ref 79–97)
PLATELET # BLD AUTO: 290 10*3/MM3 (ref 140–450)
PMV BLD AUTO: 9.9 FL (ref 6–12)
POTASSIUM SERPL-SCNC: 4 MMOL/L (ref 3.5–5.2)
PROT SERPL-MCNC: 7.2 G/DL (ref 6–8.5)
RBC # BLD AUTO: 4.75 10*6/MM3 (ref 3.77–5.28)
SODIUM SERPL-SCNC: 137 MMOL/L (ref 136–145)
TRIGL SERPL-MCNC: 71 MG/DL (ref 0–150)
VLDLC SERPL-MCNC: 14 MG/DL (ref 5–40)
WBC NRBC COR # BLD AUTO: 4.66 10*3/MM3 (ref 3.4–10.8)

## 2025-02-03 PROCEDURE — 1125F AMNT PAIN NOTED PAIN PRSNT: CPT | Performed by: FAMILY MEDICINE

## 2025-02-03 PROCEDURE — G2211 COMPLEX E/M VISIT ADD ON: HCPCS | Performed by: FAMILY MEDICINE

## 2025-02-03 PROCEDURE — 85027 COMPLETE CBC AUTOMATED: CPT

## 2025-02-03 PROCEDURE — 99214 OFFICE O/P EST MOD 30 MIN: CPT | Performed by: FAMILY MEDICINE

## 2025-02-03 PROCEDURE — 82306 VITAMIN D 25 HYDROXY: CPT

## 2025-02-03 PROCEDURE — 80061 LIPID PANEL: CPT

## 2025-02-03 PROCEDURE — 80053 COMPREHEN METABOLIC PANEL: CPT

## 2025-02-03 PROCEDURE — 83036 HEMOGLOBIN GLYCOSYLATED A1C: CPT

## 2025-02-03 PROCEDURE — 3078F DIAST BP <80 MM HG: CPT | Performed by: FAMILY MEDICINE

## 2025-02-03 PROCEDURE — 3075F SYST BP GE 130 - 139MM HG: CPT | Performed by: FAMILY MEDICINE

## 2025-02-03 PROCEDURE — 36415 COLL VENOUS BLD VENIPUNCTURE: CPT

## 2025-02-03 RX ORDER — ATORVASTATIN CALCIUM 10 MG/1
10 TABLET, FILM COATED ORAL NIGHTLY
Qty: 90 TABLET | Refills: 1 | Status: SHIPPED | OUTPATIENT
Start: 2025-02-03

## 2025-02-03 RX ORDER — LISINOPRIL AND HYDROCHLOROTHIAZIDE 10; 12.5 MG/1; MG/1
1 TABLET ORAL DAILY
Qty: 90 TABLET | Refills: 1 | Status: SHIPPED | OUTPATIENT
Start: 2025-02-03

## 2025-02-03 NOTE — PROGRESS NOTES
Simi Dangelo presents to [facility].    Chief Complaint: Blood pressure follow-up and med refill    Subjective     History of Present Illness:  Hypertension  Pertinent negatives include no blurred vision, chest pain, palpitations or shortness of breath.        She presents with essential (primary) hypertension, denies using any nonpharmacologic treatment modalities, recommend low-sodium diet.  Her current medication regimen includes a diuretic HCTZ and an ACE inhibitor lisinopril, she tolerates meds well.  Compliance with treatment has been good; she takes her medication as directed, maintains her diet and exercise regimen, and follows up as directed.  Recommend she continue to check home BPs and call if BP is greater than 140/90.    She presents with chronic hypercholesterolemia; current treatment includes Lipitor 10 mg daily and a low cholesterol/low fat diet.  Compliance with treatment has been good; she maintains her low cholesterol diet.       She presents with chronic allergies that remain well controlled on allegra 180 mg daily, h/o allergy shots but she does not need these anymore      She presents with h/o borderline anemia, stable on B12 supplementation     FH ovarian cancer in mother, , so she is getting yearly ovarian screenings with  program    She fell on her R knee going up steps carrying something in her arms, and she is worried b/c this is where her knee was replaced.  She denies pain.     She is status post reverse shoulder replacement of the left shoulder with Dr Lucas, she is stable and doing well.    Result Review   The following data was reviewed by Shanika Crawford MD on 2025.  Lab Results   Component Value Date    WBC 4.66 2025    HGB 12.8 2025    HCT 40.2 2025    MCV 84.6 2025     2025     Lab Results   Component Value Date    GLUCOSE 96 2024    BUN 12 2024    CREATININE 0.72 2024     " 07/26/2024    K 3.8 07/26/2024     07/26/2024    CALCIUM 8.9 07/26/2024    PROTEINTOT 6.6 07/26/2024    ALBUMIN 3.8 07/26/2024    ALT 21 07/26/2024    AST 24 07/26/2024    ALKPHOS 94 07/26/2024    BILITOT 0.7 07/26/2024    GLOB 2.8 07/26/2024    AGRATIO 1.4 07/26/2024    BCR 16.7 07/26/2024    ANIONGAP 11.6 07/26/2024    EGFR 86.8 07/26/2024     Lab Results   Component Value Date    CHOL 170 07/26/2024    CHLPL 178 12/14/2020    TRIG 70 07/26/2024    HDL 56 07/26/2024     (H) 07/26/2024     No results found for: \"TSH\"  Lab Results   Component Value Date    HGBA1C 6.20 (H) 07/26/2024     No results found for: \"PSA\"  No results found for: \"IRON\"   No results found for: \"RZKV38EC\"            Assessment and Plan:   Diagnoses and all orders for this visit:    1. Primary hypertension (Primary)  Comments:  Blood pressure stable and well-controlled on lisinopril with HCTZ.  Recommend low-sodium diet.  Otherwise no changes needed current meds/Tx plan  Orders:  -     lisinopril-hydrochlorothiazide (PRINZIDE,ZESTORETIC) 10-12.5 MG per tablet; Take 1 tablet by mouth Daily.  Dispense: 90 tablet; Refill: 1    2. Hypercholesteremia  Comments:  Stable on atorvastatin.  Tolerating medication well.  Previous labs reviewed and new labs ordered  Orders:  -     atorvastatin (LIPITOR) 10 MG tablet; Take 1 tablet by mouth Every Night.  Dispense: 90 tablet; Refill: 1    3. Allergic rhinitis due to pollen, unspecified seasonality  Comments:  Stable on Flonase and Allegra, she is just getting give her medications over-the-counter [employer] because they are cheaper.    4. Prediabetes  Comments:  Will check labs.  She is to avoid sweets and carbs in her diet    5. Class 1 obesity due to excess calories with serious comorbidity and body mass index (BMI) of 32.0 to 32.9 in adult  Comments:  Counseled to continue healthy diet and daily exercise    6. Anemia, unspecified type  Comments:  Not currently on iron but she is on B12 " "supplementation.  Will check labs and adjust Tx plan pending results    7. Vitamin D deficiency  Comments:  Will check vitamin D levels to see if she needs to be on vitamin D supplementation  Orders:  -     Vitamin D,25-Hydroxy; Future              Objective     Medications:  Current Outpatient Medications   Medication Instructions    amoxicillin (AMOXIL) 500 MG capsule Take 2 grams amoxicillin orally one hour before treatment/procedure    atorvastatin (LIPITOR) 10 mg, Oral, Nightly    B Complex Vitamins (vitamin b complex) capsule capsule 1 capsule, Daily    fexofenadine (ALLEGRA) 180 mg, Oral, Daily    fluticasone (FLONASE) 50 MCG/ACT nasal spray 1 spray, Nasal, 2 Times Daily    ketotifen (ZADITOR) 0.025 % ophthalmic solution 1 drop, As Needed    lisinopril-hydrochlorothiazide (PRINZIDE,ZESTORETIC) 10-12.5 MG per tablet 1 tablet, Oral, Daily    RSVPreF3 Vac Recomb Adjuvanted (Arexvy) 120 MCG/0.5ML reconstituted suspension injection Intramuscular        Vital Signs:   /66 (BP Location: Right arm, Patient Position: Sitting, Cuff Size: Adult)   Pulse 69   Temp 97.8 °F (36.6 °C) (Temporal)   Ht 160 cm (62.99\")   Wt 83 kg (183 lb)   SpO2 99%   BMI 32.43 kg/m²             Physical Exam:  Physical Exam  Vitals and nursing note reviewed.   Constitutional:       General: She is not in acute distress.     Appearance: Normal appearance. She is not ill-appearing, toxic-appearing or diaphoretic.   HENT:      Head: Normocephalic and atraumatic.      Right Ear: Tympanic membrane, ear canal and external ear normal.      Left Ear: Tympanic membrane, ear canal and external ear normal.      Nose: No congestion or rhinorrhea.      Mouth/Throat:      Mouth: Mucous membranes are moist.      Pharynx: Oropharynx is clear. No oropharyngeal exudate or posterior oropharyngeal erythema.   Eyes:      Extraocular Movements: Extraocular movements intact.      Conjunctiva/sclera: Conjunctivae normal.      Pupils: Pupils are equal, " round, and reactive to light.   Cardiovascular:      Rate and Rhythm: Normal rate and regular rhythm.      Heart sounds: Normal heart sounds.   Pulmonary:      Effort: Pulmonary effort is normal.      Breath sounds: Normal breath sounds. No wheezing, rhonchi or rales.   Abdominal:      General: Abdomen is flat.      Palpations: Abdomen is soft. There is no mass.      Tenderness: There is no abdominal tenderness.      Hernia: No hernia is present.   Musculoskeletal:      Cervical back: Neck supple. No rigidity.      Right knee: Crepitus present. No swelling, deformity, effusion, erythema or bony tenderness. Normal range of motion. No tenderness. No LCL laxity, MCL laxity, ACL laxity or PCL laxity. Abnormal patellar mobility. Normal alignment. Normal pulse.      Instability Tests: Anterior drawer test negative. Posterior drawer test negative.      Right lower leg: No edema.      Left lower leg: No edema.      Comments: Crepitus behind the patella   Lymphadenopathy:      Cervical: No cervical adenopathy.   Skin:     General: Skin is warm and dry.   Neurological:      General: No focal deficit present.      Mental Status: She is alert and oriented to person, place, and time. Mental status is at baseline.   Psychiatric:         Mood and Affect: Mood normal.         Behavior: Behavior normal.         Thought Content: Thought content normal.         Judgment: Judgment normal.         Review of Systems:  Review of Systems   Constitutional:  Negative for chills, fatigue and fever.   HENT:  Negative for ear pain, sinus pressure and sore throat.    Eyes:  Negative for blurred vision and double vision.   Respiratory:  Negative for cough, shortness of breath and wheezing.    Cardiovascular:  Negative for chest pain and palpitations.   Gastrointestinal:  Negative for abdominal pain, blood in stool, constipation, diarrhea, nausea and vomiting.   Skin:  Negative for rash.   Neurological:  Negative for dizziness and headache.    Psychiatric/Behavioral:  Negative for sleep disturbance, suicidal ideas and depressed mood. The patient is not nervous/anxious.               Follow Up   Return in about 6 months (around 8/3/2025) for Medicare Wellness.    Part of this note may be an electronic transcription/translation of spoken language to printed   text using the Dragon Dictation System.            Medical History:  Medications Discontinued During This Encounter   Medication Reason    atorvastatin (LIPITOR) 10 MG tablet Reorder    lisinopril-hydrochlorothiazide (PRINZIDE,ZESTORETIC) 10-12.5 MG per tablet Reorder      Past Medical History:    Allergy, unspecified, subsequent encounter    Bilateral primary osteoarthritis of knee    Decreased white blood cell count, unspecified    Essential (primary) hypertension    Full incontinence of feces    Hemorrhoids    History of total right knee replacement (TKR)    Hypercholesteremia    Hypertension    Left shoulder pain    Morbid (severe) obesity due to excess calories    Overweight    Pain involving joints of fingers of both hands    Presence of unspecified artificial knee joint    Pure hypercholesterolemia    Residual hemorrhoidal skin tag    Varicose veins of bilateral lower extremities with pain     Past Surgical History:    APPENDECTOMY    HYSTERECTOMY    with left oophrectomy    JOINT REPLACEMENT    TKR    OVARIAN CYST REMOVAL    TONSILLECTOMY AND ADENOIDECTOMY    TOTAL SHOULDER ARTHROPLASTY W/ DISTAL CLAVICLE EXCISION    Procedure: LEFT TOTAL SHOULDER REVERSE ARTHROPLASTY.;  Surgeon: Arnie Lucas MD;  Location: [facility];  Service: Orthopedics;  Laterality: Left;    TUBAL ABDOMINAL LIGATION    TUMOR REMOVAL    Left thumb      Family History   Problem Relation Age of Onset    Pulmonary embolism Brother     Malpuneet Hyperthermia Neg Hx      Social History     Tobacco Use    Smoking status: Never     Passive exposure: Never    Smokeless tobacco: Never   Substance Use Topics    Alcohol use:  Never       There are no preventive care reminders to display for this patient.     Immunization History   Administered Date(s) Administered    Arexvy (RSV, Adults 60+ yrs) 07/31/2024    COVID-19 (MODERNA) 12YRS+ (SPIKEVAX) 10/16/2023    COVID-19 (PFIZER) 12YRS+ (COMIRNATY) 10/14/2024    COVID-19 (PFIZER) BIVALENT 12+YRS 11/07/2022    COVID-19 (PFIZER) Purple Cap Monovalent 02/13/2021, 03/09/2021, 11/08/2021    Covid-19 (Pfizer) Gray Cap Monovalent 07/12/2022    Fluzone High-Dose 65+YRS 10/04/2017, 10/27/2020, 10/14/2024    Fluzone High-Dose 65+yrs 10/04/2017, 10/27/2020, 10/05/2021, 10/24/2022, 10/02/2023    Hep A, 2 Dose 06/14/2018    Hepatitis A 06/14/2018    Pneumococcal Conjugate 13-Valent (PCV13) 01/11/2016    Pneumococcal Polysaccharide (PPSV23) 12/06/2013    Shingrix 08/03/2022, 01/05/2023    Td (TDVAX) 02/02/2008, 09/15/2011    Tdap 07/15/2022    Zostavax 06/11/2013       No Known Allergies

## 2025-08-04 ENCOUNTER — OFFICE VISIT (OUTPATIENT)
Dept: FAMILY MEDICINE CLINIC | Age: 75
End: 2025-08-04
Payer: MEDICARE

## 2025-08-04 ENCOUNTER — TELEPHONE (OUTPATIENT)
Dept: FAMILY MEDICINE CLINIC | Age: 75
End: 2025-08-04

## 2025-08-04 ENCOUNTER — LAB (OUTPATIENT)
Dept: LAB | Facility: HOSPITAL | Age: 75
End: 2025-08-04
Payer: MEDICARE

## 2025-08-04 VITALS
SYSTOLIC BLOOD PRESSURE: 131 MMHG | BODY MASS INDEX: 33.38 KG/M2 | HEART RATE: 72 BPM | DIASTOLIC BLOOD PRESSURE: 81 MMHG | TEMPERATURE: 98.7 F | WEIGHT: 181.4 LBS | OXYGEN SATURATION: 95 % | HEIGHT: 62 IN

## 2025-08-04 DIAGNOSIS — J30.1 ALLERGIC RHINITIS DUE TO POLLEN, UNSPECIFIED SEASONALITY: ICD-10-CM

## 2025-08-04 DIAGNOSIS — Z00.00 ENCOUNTER FOR ANNUAL WELLNESS EXAM IN MEDICARE PATIENT: Primary | ICD-10-CM

## 2025-08-04 DIAGNOSIS — I10 PRIMARY HYPERTENSION: ICD-10-CM

## 2025-08-04 DIAGNOSIS — E55.9 VITAMIN D DEFICIENCY: ICD-10-CM

## 2025-08-04 DIAGNOSIS — Z23 ENCOUNTER FOR IMMUNIZATION: ICD-10-CM

## 2025-08-04 DIAGNOSIS — Z12.11 COLON CANCER SCREENING: ICD-10-CM

## 2025-08-04 DIAGNOSIS — R73.03 PREDIABETES: ICD-10-CM

## 2025-08-04 DIAGNOSIS — Z78.0 POSTMENOPAUSAL STATE: ICD-10-CM

## 2025-08-04 DIAGNOSIS — E66.811 CLASS 1 OBESITY DUE TO EXCESS CALORIES WITH SERIOUS COMORBIDITY AND BODY MASS INDEX (BMI) OF 32.0 TO 32.9 IN ADULT: ICD-10-CM

## 2025-08-04 DIAGNOSIS — E78.00 HYPERCHOLESTEREMIA: ICD-10-CM

## 2025-08-04 DIAGNOSIS — E66.09 CLASS 1 OBESITY DUE TO EXCESS CALORIES WITH SERIOUS COMORBIDITY AND BODY MASS INDEX (BMI) OF 32.0 TO 32.9 IN ADULT: ICD-10-CM

## 2025-08-04 DIAGNOSIS — Z12.31 SCREENING MAMMOGRAM FOR BREAST CANCER: ICD-10-CM

## 2025-08-04 LAB
25(OH)D3 SERPL-MCNC: 40.6 NG/ML (ref 30–100)
ALBUMIN SERPL-MCNC: 3.9 G/DL (ref 3.5–5.2)
ALBUMIN/GLOB SERPL: 1.3 G/DL
ALP SERPL-CCNC: 95 U/L (ref 39–117)
ALT SERPL W P-5'-P-CCNC: 18 U/L (ref 1–33)
ANION GAP SERPL CALCULATED.3IONS-SCNC: 11.6 MMOL/L (ref 5–15)
AST SERPL-CCNC: 23 U/L (ref 1–32)
BILIRUB SERPL-MCNC: 0.9 MG/DL (ref 0–1.2)
BUN SERPL-MCNC: 12 MG/DL (ref 8–23)
BUN/CREAT SERPL: 15.4 (ref 7–25)
CALCIUM SPEC-SCNC: 9.4 MG/DL (ref 8.6–10.5)
CHLORIDE SERPL-SCNC: 101 MMOL/L (ref 98–107)
CHOLEST SERPL-MCNC: 163 MG/DL (ref 0–200)
CO2 SERPL-SCNC: 27.4 MMOL/L (ref 22–29)
CREAT SERPL-MCNC: 0.78 MG/DL (ref 0.57–1)
DEPRECATED RDW RBC AUTO: 48.1 FL (ref 37–54)
EGFRCR SERPLBLD CKD-EPI 2021: 78.3 ML/MIN/1.73
ERYTHROCYTE [DISTWIDTH] IN BLOOD BY AUTOMATED COUNT: 15.1 % (ref 12.3–15.4)
GLOBULIN UR ELPH-MCNC: 3 GM/DL
GLUCOSE SERPL-MCNC: 95 MG/DL (ref 65–99)
HBA1C MFR BLD: 6.1 % (ref 4.8–5.6)
HCT VFR BLD AUTO: 38.2 % (ref 34–46.6)
HDLC SERPL-MCNC: 57 MG/DL (ref 40–60)
HGB BLD-MCNC: 12.4 G/DL (ref 12–15.9)
LDLC SERPL CALC-MCNC: 91 MG/DL (ref 0–100)
LDLC/HDLC SERPL: 1.59 {RATIO}
MCH RBC QN AUTO: 27.7 PG (ref 26.6–33)
MCHC RBC AUTO-ENTMCNC: 32.5 G/DL (ref 31.5–35.7)
MCV RBC AUTO: 85.3 FL (ref 79–97)
PLATELET # BLD AUTO: 252 10*3/MM3 (ref 140–450)
PMV BLD AUTO: 9.9 FL (ref 6–12)
POTASSIUM SERPL-SCNC: 4.1 MMOL/L (ref 3.5–5.2)
PROT SERPL-MCNC: 6.9 G/DL (ref 6–8.5)
RBC # BLD AUTO: 4.48 10*6/MM3 (ref 3.77–5.28)
SODIUM SERPL-SCNC: 140 MMOL/L (ref 136–145)
TRIGL SERPL-MCNC: 77 MG/DL (ref 0–150)
VLDLC SERPL-MCNC: 15 MG/DL (ref 5–40)
WBC NRBC COR # BLD AUTO: 3.98 10*3/MM3 (ref 3.4–10.8)

## 2025-08-04 PROCEDURE — 85027 COMPLETE CBC AUTOMATED: CPT

## 2025-08-04 PROCEDURE — 3075F SYST BP GE 130 - 139MM HG: CPT | Performed by: FAMILY MEDICINE

## 2025-08-04 PROCEDURE — 80053 COMPREHEN METABOLIC PANEL: CPT

## 2025-08-04 PROCEDURE — 1170F FXNL STATUS ASSESSED: CPT | Performed by: FAMILY MEDICINE

## 2025-08-04 PROCEDURE — 99214 OFFICE O/P EST MOD 30 MIN: CPT | Performed by: FAMILY MEDICINE

## 2025-08-04 PROCEDURE — G2211 COMPLEX E/M VISIT ADD ON: HCPCS | Performed by: FAMILY MEDICINE

## 2025-08-04 PROCEDURE — 80061 LIPID PANEL: CPT

## 2025-08-04 PROCEDURE — G0439 PPPS, SUBSEQ VISIT: HCPCS | Performed by: FAMILY MEDICINE

## 2025-08-04 PROCEDURE — 36415 COLL VENOUS BLD VENIPUNCTURE: CPT

## 2025-08-04 PROCEDURE — 1126F AMNT PAIN NOTED NONE PRSNT: CPT | Performed by: FAMILY MEDICINE

## 2025-08-04 PROCEDURE — 3079F DIAST BP 80-89 MM HG: CPT | Performed by: FAMILY MEDICINE

## 2025-08-04 PROCEDURE — 82306 VITAMIN D 25 HYDROXY: CPT

## 2025-08-04 PROCEDURE — 83036 HEMOGLOBIN GLYCOSYLATED A1C: CPT

## 2025-08-04 RX ORDER — LISINOPRIL AND HYDROCHLOROTHIAZIDE 10; 12.5 MG/1; MG/1
1 TABLET ORAL DAILY
Qty: 90 TABLET | Refills: 1 | Status: SHIPPED | OUTPATIENT
Start: 2025-08-04

## 2025-08-04 RX ORDER — ATORVASTATIN CALCIUM 10 MG/1
10 TABLET, FILM COATED ORAL NIGHTLY
Qty: 90 TABLET | Refills: 1 | Status: SHIPPED | OUTPATIENT
Start: 2025-08-04

## (undated) DEVICE — TOTAL KNEE-LF: Brand: MEDLINE INDUSTRIES, INC.

## (undated) DEVICE — PENCL E/S SMOKEEVAC W/TELESCP CANN

## (undated) DEVICE — Device

## (undated) DEVICE — PROXIMATE RH ROTATING HEAD SKIN STAPLERS (35 WIDE) CONTAINS 35 STAINLESS STEEL STAPLES: Brand: PROXIMATE

## (undated) DEVICE — 40585 XL ADVANCED TRENDELENBURG POSITIONING KIT: Brand: 40585 XL ADVANCED TRENDELENBURG POSITIONING KIT

## (undated) DEVICE — MAT FLR ABS W/BLU/LINER 56X72IN WHT

## (undated) DEVICE — POSTN HD UNIV

## (undated) DEVICE — ANTIBACTERIAL VIOLET BRAIDED (POLYGLACTIN 910), SYNTHETIC ABSORBABLE SURGICAL SUTURE: Brand: COATED VICRYL

## (undated) DEVICE — STRYKER PERFORMANCE SERIES SAGITTAL BLADE: Brand: STRYKER PERFORMANCE SERIES

## (undated) DEVICE — BIPOLAR SEALER 23-112-1 AQM 6.0: Brand: AQUAMANTYS™

## (undated) DEVICE — SOL IRR NACL 0.9PCT BT 1000ML

## (undated) DEVICE — DRP SURG U/DRP INVISISHIELD PA 48X52IN

## (undated) DEVICE — STERILE POLYISOPRENE POWDER-FREE SURGICAL GLOVES WITH EMOLLIENT COATING: Brand: PROTEXIS

## (undated) DEVICE — ELECTRD BLD EDGE/STD 1P 2.4X6.35MM STRL

## (undated) DEVICE — GLV SURG SENSICARE PI ORTHO SZ8 LF STRL

## (undated) DEVICE — STERILE POLYISOPRENE POWDER-FREE SURGICAL GLOVES: Brand: PROTEXIS

## (undated) DEVICE — CVR LEG BOOTLEG F/R NOSKID 33IN

## (undated) DEVICE — TOWEL,OR,DSP,ST,BLUE,STD,4/PK,20PK/CS: Brand: MEDLINE

## (undated) DEVICE — DRSNG PAD ABD 8X10IN STRL

## (undated) DEVICE — SLV SCD KN/LEN ADJ EXPRSS BLENDED MD 1P/U

## (undated) DEVICE — APPL CHLORAPREP HI/LITE 26ML ORNG

## (undated) DEVICE — SUT VIC 2/0 CT1 36IN

## (undated) DEVICE — SHOULDER P.A.D. III: Brand: DEROYAL

## (undated) DEVICE — SOL IRR NACL 0.9PCT 3000ML

## (undated) DEVICE — SOL NACL 0.9PCT 250ML

## (undated) DEVICE — SUT VICRYL 0 TIES J112T

## (undated) DEVICE — PULLOVER TOGA, 2X LARGE: Brand: FLYTE, SURGICOOL

## (undated) DEVICE — DRAPE,SHOULDER,BEACH ULTRAGARD: Brand: MEDLINE

## (undated) DEVICE — Device: Brand: PULSAVAC®

## (undated) DEVICE — INTENDED FOR TISSUE SEPARATION, AND OTHER PROCEDURES THAT REQUIRE A SHARP SURGICAL BLADE TO PUNCTURE OR CUT.: Brand: BARD-PARKER ® CARBON RIB-BACK BLADES